# Patient Record
Sex: MALE | Race: WHITE | Employment: FULL TIME | URBAN - METROPOLITAN AREA
[De-identification: names, ages, dates, MRNs, and addresses within clinical notes are randomized per-mention and may not be internally consistent; named-entity substitution may affect disease eponyms.]

---

## 2017-03-08 ENCOUNTER — TRANSCRIBE ORDERS (OUTPATIENT)
Dept: SLEEP CENTER | Facility: CLINIC | Age: 52
End: 2017-03-08

## 2017-03-08 ENCOUNTER — HOSPITAL ENCOUNTER (OUTPATIENT)
Dept: SLEEP CENTER | Facility: CLINIC | Age: 52
Discharge: HOME | End: 2017-03-08
Payer: COMMERCIAL

## 2017-03-08 DIAGNOSIS — G47.33 OSA (OBSTRUCTIVE SLEEP APNEA): Primary | ICD-10-CM

## 2017-03-08 DIAGNOSIS — G47.33 OSA (OBSTRUCTIVE SLEEP APNEA): ICD-10-CM

## 2017-04-26 ENCOUNTER — HOSPITAL ENCOUNTER (OUTPATIENT)
Dept: SLEEP CENTER | Facility: CLINIC | Age: 52
Discharge: HOME | End: 2017-04-26
Payer: COMMERCIAL

## 2017-04-26 DIAGNOSIS — G47.33 OSA (OBSTRUCTIVE SLEEP APNEA): ICD-10-CM

## 2017-04-26 PROCEDURE — 95811 POLYSOM 6/>YRS CPAP 4/> PARM: CPT

## 2017-05-23 ENCOUNTER — HOSPITAL ENCOUNTER (OUTPATIENT)
Dept: SLEEP CENTER | Facility: CLINIC | Age: 52
Discharge: HOME | End: 2017-05-23
Payer: COMMERCIAL

## 2017-05-23 ENCOUNTER — TRANSCRIBE ORDERS (OUTPATIENT)
Dept: SLEEP CENTER | Facility: CLINIC | Age: 52
End: 2017-05-23

## 2017-05-23 DIAGNOSIS — G47.33 OSA (OBSTRUCTIVE SLEEP APNEA): Primary | ICD-10-CM

## 2017-05-23 DIAGNOSIS — G47.33 OSA (OBSTRUCTIVE SLEEP APNEA): ICD-10-CM

## 2018-05-01 ENCOUNTER — OFFICE VISIT (OUTPATIENT)
Dept: SLEEP CENTER | Facility: CLINIC | Age: 53
End: 2018-05-01
Payer: COMMERCIAL

## 2018-05-01 VITALS
BODY MASS INDEX: 27.25 KG/M2 | HEART RATE: 72 BPM | DIASTOLIC BLOOD PRESSURE: 64 MMHG | HEIGHT: 69 IN | WEIGHT: 184 LBS | SYSTOLIC BLOOD PRESSURE: 102 MMHG

## 2018-05-01 DIAGNOSIS — G47.33 OSA (OBSTRUCTIVE SLEEP APNEA): ICD-10-CM

## 2018-05-01 PROCEDURE — 99213 OFFICE O/P EST LOW 20 MIN: CPT | Performed by: INTERNAL MEDICINE

## 2018-05-01 NOTE — PROGRESS NOTES
Progress Note - Sleep Center   Khloe Moore :1965 MRN: [id number]      Reason for Visit:  46 y o male here for annual follow-up    Assessment:  Doing well on current therapy of CPAP 15 cm for very severe CECILIA (MARIZA = 60)  Plan:  Continue same    Follow up: One year    History of Present Illness:  History of CECILIA on PAP therapy  Fully compliant and deriving benefit  Once the patient's pressure was adjusted to 15 cm, his snoring was almost completely eliminated  Historical Information    Past Medical History: History reviewed  No pertinent past medical history  Past Surgical History: History reviewed  No pertinent surgical history  Social History - see chart  History   Alcohol use: Not on file     History   Drug Use Not on file     History   Smoking Status    Not on file   Smokeless Tobacco    Not on file     Family History: History reviewed  No pertinent family history  Medications/Allergies:    No current outpatient prescriptions on file  Review of Systems      Genitourinary none   Cardiology none   Gastrointestinal none   Neurology none   Constitutional none   Integumentary none   Psychiatry none   Musculoskeletal none   Pulmonary none   ENT none   Endocrine none   Hematological none             Objective      Vital Signs:   Vitals:    18 0800   BP: 102/64   Pulse: 72     [place] Sleepiness Scale: Total score: 5        Physical Exam:    General: Alert, appropriate, cooperative, overweight    Head: NC/AT    Skin: Warm, dry    Neuro: No motor abnormalities, cranial nerves appear intact    Extremity: No clubbing, cyanosis    PAP setting:   15 cm  DME Provider: [facility]    Counseling / Coordination of Care  Total clinic time spent today 15 minutes  Greater than 50% of total time was spent with the patient and / or family counseling and / or coordination of care   A description of the counseling / coordination of care: Discussed equipment and response to treatment  JACKSON Goyal    Board Certified Sleep Specialist

## 2019-05-07 ENCOUNTER — OFFICE VISIT (OUTPATIENT)
Dept: SLEEP CENTER | Facility: CLINIC | Age: 54
End: 2019-05-07
Payer: COMMERCIAL

## 2019-05-07 VITALS
WEIGHT: 194.38 LBS | HEIGHT: 69 IN | DIASTOLIC BLOOD PRESSURE: 76 MMHG | BODY MASS INDEX: 28.79 KG/M2 | SYSTOLIC BLOOD PRESSURE: 116 MMHG

## 2019-05-07 DIAGNOSIS — G47.33 OSA (OBSTRUCTIVE SLEEP APNEA): Primary | ICD-10-CM

## 2019-05-07 PROCEDURE — 99213 OFFICE O/P EST LOW 20 MIN: CPT | Performed by: INTERNAL MEDICINE

## 2019-08-12 ENCOUNTER — OFFICE VISIT (OUTPATIENT)
Dept: FAMILY MEDICINE CLINIC | Facility: CLINIC | Age: 54
End: 2019-08-12
Payer: COMMERCIAL

## 2019-08-12 VITALS
OXYGEN SATURATION: 96 % | HEART RATE: 90 BPM | BODY MASS INDEX: 27.31 KG/M2 | SYSTOLIC BLOOD PRESSURE: 110 MMHG | RESPIRATION RATE: 16 BRPM | WEIGHT: 184.4 LBS | HEIGHT: 69 IN | DIASTOLIC BLOOD PRESSURE: 78 MMHG | TEMPERATURE: 98.1 F

## 2019-08-12 DIAGNOSIS — Z00.00 WELL ADULT EXAM: ICD-10-CM

## 2019-08-12 DIAGNOSIS — Z13.9 ENCOUNTER FOR HEALTH-RELATED SCREENING: ICD-10-CM

## 2019-08-12 DIAGNOSIS — Z12.11 ENCOUNTER FOR SCREENING FOR MALIGNANT NEOPLASM OF COLON: ICD-10-CM

## 2019-08-12 DIAGNOSIS — Z11.59 NEED FOR HEPATITIS C SCREENING TEST: Primary | ICD-10-CM

## 2019-08-12 DIAGNOSIS — Z72.0 TOBACCO ABUSE: ICD-10-CM

## 2019-08-12 PROBLEM — G47.39 OTHER SLEEP APNEA: Status: ACTIVE | Noted: 2019-08-12

## 2019-08-12 PROCEDURE — 99214 OFFICE O/P EST MOD 30 MIN: CPT | Performed by: NURSE PRACTITIONER

## 2019-08-12 PROCEDURE — 3008F BODY MASS INDEX DOCD: CPT | Performed by: NURSE PRACTITIONER

## 2019-08-12 NOTE — PATIENT INSTRUCTIONS
Weight Management   AMBULATORY CARE:   Why it is important to manage your weight:  Being overweight increases your risk of health conditions such as heart disease, high blood pressure, type 2 diabetes, and certain types of cancer  It can also increase your risk for osteoarthritis, sleep apnea, and other respiratory problems  Aim for a slow, steady weight loss  Even a small amount of weight loss can lower your risk of health problems  How to lose weight safely:  A safe and healthy way to lose weight is to eat fewer calories and get regular exercise  You can lose up about 1 pound a week by decreasing the number of calories you eat by 500 calories each day  You can decrease calories by eating smaller portion sizes or by cutting out high-calorie foods  Read labels to find out how many calories are in the foods you eat  You can also burn calories with exercise such as walking, swimming, or biking  You will be more likely to keep weight off if you make these changes part of your lifestyle  Healthy meal plan for weight management:  A healthy meal plan includes a variety of foods, contains fewer calories, and helps you stay healthy  A healthy meal plan includes the following:  · Eat whole-grain foods more often  A healthy meal plan should contain fiber  Fiber is the part of grains, fruits, and vegetables that is not broken down by your body  Whole-grain foods are healthy and provide extra fiber in your diet  Some examples of whole-grain foods are whole-wheat breads and pastas, oatmeal, brown rice, and bulgur  · Eat a variety of vegetables every day  Include dark, leafy greens such as spinach, kale, kimberly greens, and mustard greens  Eat yellow and orange vegetables such as carrots, sweet potatoes, and winter squash  · Eat a variety of fruits every day  Choose fresh or canned fruit (canned in its own juice or light syrup) instead of juice  Fruit juice has very little or no fiber  · Eat low-fat dairy foods  Drink fat-free (skim) milk or 1% milk  Eat fat-free yogurt and low-fat cottage cheese  Try low-fat cheeses such as mozzarella and other reduced-fat cheeses  · Choose meat and other protein foods that are low in fat  Choose beans or other legumes such as split peas or lentils  Choose fish, skinless poultry (chicken or turkey), or lean cuts of red meat (beef or pork)  Before you cook meat or poultry, cut off any visible fat  · Use less fat and oil  Try baking foods instead of frying them  Add less fat, such as margarine, sour cream, regular salad dressing and mayonnaise to foods  Eat fewer high-fat foods  Some examples of high-fat foods include french fries, doughnuts, ice cream, and cakes  · Eat fewer sweets  Limit foods and drinks that are high in sugar  This includes candy, cookies, regular soda, and sweetened drinks  Ways to decrease calories:   · Eat smaller portions  ¨ Use a small plate with smaller servings  ¨ Do not eat second helpings  ¨ When you eat at a restaurant, ask for a box and place half of your meal in the box before you eat  ¨ Share an entrée with someone else  · Replace high-calorie snacks with healthy, low-calorie snacks  ¨ Choose fresh fruit, vegetables, fat-free rice cakes, or air-popped popcorn instead of potato chips, nuts, or chocolate  ¨ Choose water or calorie-free drinks instead of soda or sweetened drinks  · Eat regular meals  Skipping meals can lead to overeating later in the day  Eat a healthy snack in place of a meal if you do not have time to eat a regular meal      · Do not shop for groceries when you are hungry  You may be more likely to make unhealthy food choices  Take a grocery list of healthy foods and shop after you have eaten  Exercise:  Exercise at least 30 minutes per day on most days of the week  Some examples of exercise include walking, biking, dancing, and swimming   You can also fit in more physical activity by taking the stairs instead of the elevator or parking farther away from stores  Ask your healthcare provider about the best exercise plan for you  Other things to consider as you try to lose weight:   · Be aware of situations that may give you the urge to overeat, such as eating while watching television  Find ways to avoid these situations  For example, read a book, go for a walk, or do crafts  · Meet with a weight loss support group or friends who are also trying to lose weight  This may help you stay motivated to continue working on your weight loss goals  © 2017 [facility] Information is for End User's use only and may not be sold, redistributed or otherwise used for commercial purposes  All illustrations and images included in CareNotes® are the copyrighted property of [employer] D [employer] M [employer]  or [employer]  The above information is an  only  It is not intended as medical advice for individual conditions or treatments  Talk to your doctor, nurse or pharmacist before following any medical regimen to see if it is safe and effective for you  Heart Healthy Diet   AMBULATORY CARE:   A heart healthy diet  is an eating plan low in total fat, unhealthy fats, and sodium (salt)  A heart healthy diet helps decrease your risk for heart disease and stroke  Limit the amount of fat you eat to 25% to 35% of your total daily calories  Limit sodium to less than 2,300 mg each day  Healthy fats:  Healthy fats can help improve cholesterol levels  The risk for heart disease is decreased when cholesterol levels are normal  Choose healthy fats, such as the following:  · Unsaturated fat  is found in foods such as soybean, canola, olive, corn, and safflower oils  It is also found in soft tub margarine that is made with liquid vegetable oil  · Omega-3 fat  is found in certain fish, such as salmon, tuna, and trout, and in walnuts and flaxseed    Unhealthy fats:  Unhealthy fats can cause unhealthy cholesterol levels in your blood and increase your risk of heart disease  Limit unhealthy fats, such as the following:  · Cholesterol  is found in animal foods, such as eggs and lobster, and in dairy products made from whole milk  Limit cholesterol to less than 300 milligrams (mg) each day  You may need to limit cholesterol to 200 mg each day if you have heart disease  · Saturated fat  is found in meats, such as marsh and hamburger  It is also found in chicken or turkey skin, whole milk, and butter  Limit saturated fat to less than 7% of your total daily calories  Limit saturated fat to less than 6% if you have heart disease or are at increased risk for it  · Trans fat  is found in packaged foods, such as potato chips and cookies  It is also in hard margarine, some fried foods, and shortening  Avoid trans fats as much as possible    Heart healthy foods and drinks to include:  Ask your dietitian or healthcare provider how many servings to have from each of the following food groups:  · Grains:      ¨ Whole-wheat breads, cereals, and pastas, and brown rice    ¨ Low-fat, low-sodium crackers and chips    · Vegetables:      ¨ Broccoli, green beans, green peas, and spinach    ¨ Collards, kale, and lima beans    ¨ Carrots, sweet potatoes, tomatoes, and peppers    ¨ Canned vegetables with no salt added    · Fruits:      ¨ Bananas, peaches, pears, and pineapple    ¨ Grapes, raisins, and dates    ¨ Oranges, tangerines, grapefruit, orange juice, and grapefruit juice    ¨ Apricots, mangoes, melons, and papaya    ¨ Raspberries and strawberries    ¨ Canned fruit with no added sugar    · Low-fat dairy products:      ¨ Nonfat (skim) milk, 1% milk, and low-fat almond, cashew, or soy milks fortified with calcium    ¨ Low-fat cheese, regular or frozen yogurt, and cottage cheese    · Meats and proteins , such as lean cuts of beef and pork (loin, leg, round), skinless chicken and turkey, legumes, soy products, egg whites, and nuts  Foods and drinks to limit or avoid:  Ask your dietitian or healthcare provider about these and other foods that are high in unhealthy fat, sodium, and sugar:  · Snack or packaged foods , such as frozen dinners, cookies, macaroni and cheese, and cereals with more than 300 mg of sodium per serving    · Canned or dry mixes  for cakes, soups, sauces, or gravies    · Vegetables with added sodium , such as instant potatoes, vegetables with added sauces, or regular canned vegetables    · Other foods high in sodium , such as ketchup, barbecue sauce, salad dressing, pickles, olives, soy sauce, and miso    · High-fat dairy foods  such as whole or 2% milk, cream cheese, or sour cream, and cheeses     · High-fat protein foods  such as high-fat cuts of beef (T-bone steaks, ribs), chicken or turkey with skin, and organ meats, such as liver    · Cured or smoked meats , such as hot dogs, marsh, and sausage    · Unhealthy fats and oils , such as butter, stick margarine, shortening, and cooking oils such as coconut or palm oil    · Food and drinks high in sugar , such as soft drinks (soda), sports drinks, sweetened tea, candy, cake, cookies, pies, and doughnuts  Other diet guidelines to follow:   · Eat more foods containing omega-3 fats  Eat fish high in omega-3 fats at least 2 times a week  · Limit alcohol  Too much alcohol can damage your heart and raise your blood pressure  Women should limit alcohol to 1 drink a day  Men should limit alcohol to 2 drinks a day  A drink of alcohol is 12 ounces of beer, 5 ounces of wine, or 1½ ounces of liquor  · Choose low-sodium foods  High-sodium foods can lead to high blood pressure  Add little or no salt to food you prepare  Use herbs and spices in place of salt  · Eat more fiber  to help lower cholesterol levels  Eat at least 5 servings of fruits and vegetables each day  Eat 3 ounces of whole-grain foods each day  Legumes (beans) are also a good source of fiber    Lifestyle guidelines: · Do not smoke  Nicotine and other chemicals in cigarettes and cigars can cause lung and heart damage  Ask your healthcare provider for information if you currently smoke and need help to quit  E-cigarettes or smokeless tobacco still contain nicotine  Talk to your healthcare provider before you use these products  · Exercise regularly  to help you maintain a healthy weight and improve your blood pressure and cholesterol levels  Ask your healthcare provider about the best exercise plan for you  Do not start an exercise program without asking your healthcare provider  Follow up with your healthcare provider as directed:  Write down your questions so you remember to ask them during your visits  © 2017 [address] [employer] Information is for End User's use only and may not be sold, redistributed or otherwise used for commercial purposes  All illustrations and images included in CareNotes® are the copyrighted property of [employer]  or [employer]  The above information is an  only  It is not intended as medical advice for individual conditions or treatments  Talk to your doctor, nurse or pharmacist before following any medical regimen to see if it is safe and effective for you

## 2019-08-12 NOTE — ASSESSMENT & PLAN NOTE
Healthy adult male  Will order screening testing for fatigue  Discussed smoking cessation but he states he is not ready  He is to return for lab results

## 2019-08-12 NOTE — PROGRESS NOTES
Assessment/Plan:    Well adult exam  Healthy adult male  Will order screening testing for fatigue  Discussed smoking cessation but he states he is not ready  He is to return for lab results  Tobacco abuse  Discussed smoking cessation  He is not ready but will contact the when he is ready  Encounter for health-related screening  Will do screening labs including PSA, lipids and TSH  Diagnoses and all orders for this visit:    Need for hepatitis C screening test  -     Hepatitis C antibody; Future    Encounter for screening for malignant neoplasm of colon  -     Occult Blood, Fecal Immunochemical; Future    Encounter for health-related screening  -     PSA, total and free; Future  -     CBC and differential; Future  -     Comprehensive metabolic panel; Future  -     Lipid Panel with Direct LDL reflex; Future  -     TSH, 3rd generation with Free T4 reflex; Future    Tobacco abuse  -     CBC and differential; Future    Well adult exam          Subjective:      Patient ID: Gatito Lockwood is a 53 y o  male  HPI  Presents today for a well visit  He smokes 1 5 PPD  Has a history of sleep apnea  Wife is concerned about his being tried al the time  He works 12 hours 5-6 days a week  The following portions of the patient's history were reviewed and updated as appropriate:   He  has a past medical history of Sleep apnea  He   Patient Active Problem List    Diagnosis Date Noted    Tobacco abuse 08/12/2019    Other sleep apnea 08/12/2019    Well adult exam 08/12/2019    Encounter for health-related screening 08/12/2019    Need for hepatitis C screening test 08/12/2019    Encounter for screening for malignant neoplasm of colon 08/12/2019     He  has a past surgical history that includes Testicle surgery  His family history is not on file  He  reports that he has been smoking  He has been smoking about 1 50 packs per day  He uses smokeless tobacco  He reports that he drinks alcohol   He reports that he does not use drugs  No current outpatient medications on file  No current facility-administered medications for this visit  No current outpatient medications on file prior to visit  No current facility-administered medications on file prior to visit  He has No Known Allergies       Review of Systems   Constitutional: Positive for fatigue  HENT: Negative  Eyes: Negative  Respiratory: Positive for wheezing  Cardiovascular: Negative  Gastrointestinal: Negative  Genitourinary: Negative  Musculoskeletal: Negative  Skin: Negative  Allergic/Immunologic: Negative  Neurological: Negative  Hematological: Negative  Psychiatric/Behavioral: Negative  Objective:      /78   Pulse 90   Temp 98 1 °F (36 7 °C) (Tympanic)   Resp 16   Ht 5' 9" (1 753 m)   Wt 83 6 kg (184 lb 6 4 oz)   SpO2 96%   BMI 27 23 kg/m²          Physical Exam   Constitutional: He is oriented to person, place, and time  He appears well-developed and well-nourished  HENT:   Head: Normocephalic  Right Ear: External ear normal    Left Ear: External ear normal    Mouth/Throat: Oropharynx is clear and moist    Eyes: Pupils are equal, round, and reactive to light  Conjunctivae and EOM are normal    Neck: Normal range of motion  Neck supple  No thyromegaly present  Cardiovascular: Normal rate, regular rhythm, normal heart sounds and intact distal pulses  Pulmonary/Chest: Effort normal and breath sounds normal    Abdominal: Soft  Bowel sounds are normal    Musculoskeletal: Normal range of motion  Lymphadenopathy:     He has no cervical adenopathy  Neurological: He is alert and oriented to person, place, and time  Skin: Skin is warm and dry  Capillary refill takes less than 2 seconds  Psychiatric: He has a normal mood and affect  His behavior is normal  Judgment and thought content normal        BMI Counseling: Body mass index is 27 23 kg/m²   Discussed the patient's BMI with him  The BMI is above average  BMI counseling and education was provided to the patient  Nutrition recommendations include reducing portion sizes, decreasing overall calorie intake, 3-5 servings of fruits/vegetables daily, reducing fast food intake, consuming healthier snacks, decreasing soda and/or juice intake, moderation in carbohydrate intake, increasing intake of lean protein, reducing intake of saturated fat and trans fat and reducing intake of cholesterol  Exercise recommendations include moderate aerobic physical activity for 150 minutes/week, vigorous aerobic physical activity for 75 minutes/week and exercising 3-5 times per week  Pharmacotherapy was recommended as ordered

## 2019-08-21 ENCOUNTER — APPOINTMENT (OUTPATIENT)
Dept: LAB | Facility: IMAGING CENTER | Age: 54
End: 2019-08-21
Payer: COMMERCIAL

## 2019-08-21 DIAGNOSIS — Z13.9 ENCOUNTER FOR HEALTH-RELATED SCREENING: ICD-10-CM

## 2019-08-21 DIAGNOSIS — Z11.59 NEED FOR HEPATITIS C SCREENING TEST: ICD-10-CM

## 2019-08-21 DIAGNOSIS — Z72.0 TOBACCO ABUSE: ICD-10-CM

## 2019-08-21 LAB
BASOPHILS # BLD AUTO: 0.06 THOUSANDS/ΜL (ref 0–0.1)
BASOPHILS NFR BLD AUTO: 1 % (ref 0–1)
EOSINOPHIL # BLD AUTO: 0 THOUSAND/ΜL (ref 0–0.61)
EOSINOPHIL NFR BLD AUTO: 0 % (ref 0–6)
ERYTHROCYTE [DISTWIDTH] IN BLOOD BY AUTOMATED COUNT: 13.2 % (ref 11.6–15.1)
HCT VFR BLD AUTO: 53.1 % (ref 36.5–49.3)
HCV AB SER QL: NORMAL
HGB BLD-MCNC: 17.5 G/DL (ref 12–17)
IMM GRANULOCYTES # BLD AUTO: 0.05 THOUSAND/UL (ref 0–0.2)
IMM GRANULOCYTES NFR BLD AUTO: 1 % (ref 0–2)
LYMPHOCYTES # BLD AUTO: 2.34 THOUSANDS/ΜL (ref 0.6–4.47)
LYMPHOCYTES NFR BLD AUTO: 24 % (ref 14–44)
MCH RBC QN AUTO: 31.3 PG (ref 26.8–34.3)
MCHC RBC AUTO-ENTMCNC: 33 G/DL (ref 31.4–37.4)
MCV RBC AUTO: 95 FL (ref 82–98)
MONOCYTES # BLD AUTO: 0.66 THOUSAND/ΜL (ref 0.17–1.22)
MONOCYTES NFR BLD AUTO: 7 % (ref 4–12)
NEUTROPHILS # BLD AUTO: 6.68 THOUSANDS/ΜL (ref 1.85–7.62)
NEUTS SEG NFR BLD AUTO: 67 % (ref 43–75)
NRBC BLD AUTO-RTO: 0 /100 WBCS
PLATELET # BLD AUTO: 380 THOUSANDS/UL (ref 149–390)
PMV BLD AUTO: 9.7 FL (ref 8.9–12.7)
RBC # BLD AUTO: 5.59 MILLION/UL (ref 3.88–5.62)
TSH SERPL DL<=0.05 MIU/L-ACNC: 0.92 UIU/ML (ref 0.36–3.74)
WBC # BLD AUTO: 9.79 THOUSAND/UL (ref 4.31–10.16)

## 2019-08-21 PROCEDURE — 85025 COMPLETE CBC W/AUTO DIFF WBC: CPT

## 2019-08-21 PROCEDURE — 36415 COLL VENOUS BLD VENIPUNCTURE: CPT

## 2019-08-21 PROCEDURE — 86803 HEPATITIS C AB TEST: CPT

## 2019-08-21 PROCEDURE — 84153 ASSAY OF PSA TOTAL: CPT

## 2019-08-21 PROCEDURE — 84154 ASSAY OF PSA FREE: CPT

## 2019-08-21 PROCEDURE — 84443 ASSAY THYROID STIM HORMONE: CPT

## 2019-08-22 LAB
PSA FREE MFR SERPL: 7.1 %
PSA FREE SERPL-MCNC: 0.15 NG/ML
PSA SERPL-MCNC: 2.1 NG/ML (ref 0–4)

## 2019-08-26 ENCOUNTER — APPOINTMENT (OUTPATIENT)
Dept: LAB | Facility: IMAGING CENTER | Age: 54
End: 2019-08-26
Payer: COMMERCIAL

## 2019-08-26 LAB
ALBUMIN SERPL BCP-MCNC: 3.8 G/DL (ref 3.5–5)
ALP SERPL-CCNC: 68 U/L (ref 46–116)
ALT SERPL W P-5'-P-CCNC: 22 U/L (ref 12–78)
ANION GAP SERPL CALCULATED.3IONS-SCNC: 7 MMOL/L (ref 4–13)
AST SERPL W P-5'-P-CCNC: 8 U/L (ref 5–45)
BILIRUB SERPL-MCNC: 0.45 MG/DL (ref 0.2–1)
BUN SERPL-MCNC: 14 MG/DL (ref 5–25)
CALCIUM SERPL-MCNC: 8.9 MG/DL (ref 8.3–10.1)
CHLORIDE SERPL-SCNC: 107 MMOL/L (ref 100–108)
CHOLEST SERPL-MCNC: 208 MG/DL (ref 50–200)
CO2 SERPL-SCNC: 25 MMOL/L (ref 21–32)
CREAT SERPL-MCNC: 0.94 MG/DL (ref 0.6–1.3)
GFR SERPL CREATININE-BSD FRML MDRD: 91 ML/MIN/1.73SQ M
GLUCOSE P FAST SERPL-MCNC: 106 MG/DL (ref 65–99)
HDLC SERPL-MCNC: 39 MG/DL (ref 40–60)
LDLC SERPL CALC-MCNC: 145 MG/DL (ref 0–100)
POTASSIUM SERPL-SCNC: 4.3 MMOL/L (ref 3.5–5.3)
PROT SERPL-MCNC: 6.9 G/DL (ref 6.4–8.2)
SODIUM SERPL-SCNC: 139 MMOL/L (ref 136–145)
TRIGL SERPL-MCNC: 120 MG/DL

## 2019-08-26 PROCEDURE — 80053 COMPREHEN METABOLIC PANEL: CPT

## 2019-08-26 PROCEDURE — 80061 LIPID PANEL: CPT

## 2019-08-26 PROCEDURE — 36415 COLL VENOUS BLD VENIPUNCTURE: CPT

## 2019-08-28 ENCOUNTER — TELEPHONE (OUTPATIENT)
Dept: FAMILY MEDICINE CLINIC | Facility: CLINIC | Age: 54
End: 2019-08-28

## 2019-08-28 NOTE — TELEPHONE ENCOUNTER
----- Message from Taaz sent at 8/27/2019  7:19 PM EDT -----  LDL is elevated  He has to low fat, no marbled meats, increase fiber  Will recheck in 3 mmonths

## 2019-11-08 ENCOUNTER — TELEPHONE (OUTPATIENT)
Dept: SLEEP CENTER | Facility: CLINIC | Age: 54
End: 2019-11-08

## 2019-11-08 ENCOUNTER — OFFICE VISIT (OUTPATIENT)
Dept: SLEEP CENTER | Facility: CLINIC | Age: 54
End: 2019-11-08
Payer: COMMERCIAL

## 2019-11-08 VITALS
SYSTOLIC BLOOD PRESSURE: 110 MMHG | WEIGHT: 189 LBS | HEIGHT: 69 IN | BODY MASS INDEX: 27.99 KG/M2 | DIASTOLIC BLOOD PRESSURE: 68 MMHG

## 2019-11-08 DIAGNOSIS — G47.33 OSA (OBSTRUCTIVE SLEEP APNEA): Primary | ICD-10-CM

## 2019-11-08 PROCEDURE — 99213 OFFICE O/P EST LOW 20 MIN: CPT | Performed by: INTERNAL MEDICINE

## 2019-11-08 NOTE — PROGRESS NOTES
Progress Note - Sleep Center   Antonio Carracso :1965 MRN: [id number]      Reason for Visit:  53 y  o male here for PAP compliance check    Assessment:  Having difficulty with new PAP device  Sleep quality is unimproved  Compliance data show utilization for less than 70% of nights, for greater than or equal to 4 hours per night  Plan:  Improve compliance    Follow up: One year    History of Present Illness:  History of CECILIA on PAP therapy  Difficulty with compliance  Review of Systems      Genitourinary none   Cardiology none   Gastrointestinal none   Neurology none   Constitutional none   Integumentary none   Psychiatry none   Musculoskeletal legs twitching/jerking   Pulmonary snoring   ENT none   Endocrine none   Hematological none           I have reviewed and updated the review of systems as necessary    Historical Information    Past Medical History:   Past Medical History:   Diagnosis Date    Sleep apnea          Past Surgical History:   Past Surgical History:   Procedure Laterality Date    TESTICLE SURGERY           Social History:   Social History     Socioeconomic History    Marital status: Single     Spouse name: None    Number of children: None    Years of education: None    Highest education level: None   Occupational History    None   Social Needs    Financial resource strain: None    Food insecurity:     Worry: None     Inability: None    Transportation needs:     Medical: None     Non-medical: None   Tobacco Use    Smoking status: Current Every Day Smoker     Packs/day: 1 50    Smokeless tobacco: Current User   Substance and Sexual Activity    Alcohol use:  Yes    Drug use: No    Sexual activity: Yes     Partners: Female   Lifestyle    Physical activity:     Days per week: None     Minutes per session: None    Stress: None   Relationships    Social connections:     Talks on phone: None     Gets together: None     Attends Caodaism service: None     Active member of club or organization: None     Attends meetings of clubs or organizations: None     Relationship status: None    Intimate partner violence:     Fear of current or ex partner: None     Emotionally abused: None     Physically abused: None     Forced sexual activity: None   Other Topics Concern    None   Social History Narrative    None         Family History: No family history on file  Medications/Allergies:    No current outpatient medications on file  Objective    Vital Signs:   Vitals:    11/08/19 1000   BP: 110/68     [place] Sleepiness Scale: Total score: 5        Physical Exam:    General: Alert, appropriate, cooperative, overweight    Head: NC/AT    Skin: Warm, dry    Neuro: No motor abnormalities, cranial nerves appear intact    Extremity: No clubbing, cyanosis    PAP setting:   15 cm  DME Provider:  [facility]  Test results:  MARIAZ=60    Counseling / Coordination of Care  Total clinic time spent today 20 min  A description of the counseling / coordination of care: Discussed means to improve compliance  JACKSON Love    Board Certified Sleep Specialist

## 2019-11-08 NOTE — TELEPHONE ENCOUNTER
Received an RX for a pressure change  Changed accordingly  Patient's now set to an APAP 15-20cm   Patient was informed in the office

## 2020-01-09 PROBLEM — H26.9 CATARACT: Status: ACTIVE | Noted: 2020-01-09

## 2020-01-09 PROBLEM — Z01.818 PREOP EXAMINATION: Status: ACTIVE | Noted: 2020-01-09

## 2020-01-09 PROBLEM — Z13.9 ENCOUNTER FOR HEALTH-RELATED SCREENING: Status: RESOLVED | Noted: 2019-08-12 | Resolved: 2020-01-09

## 2020-01-09 PROBLEM — Z11.59 NEED FOR HEPATITIS C SCREENING TEST: Status: RESOLVED | Noted: 2019-08-12 | Resolved: 2020-01-09

## 2020-01-09 PROBLEM — E78.2 MIXED HYPERLIPIDEMIA: Status: ACTIVE | Noted: 2020-01-09

## 2020-01-10 ENCOUNTER — CONSULT (OUTPATIENT)
Dept: FAMILY MEDICINE CLINIC | Facility: CLINIC | Age: 55
End: 2020-01-10
Payer: COMMERCIAL

## 2020-01-10 VITALS
HEIGHT: 68 IN | BODY MASS INDEX: 28.64 KG/M2 | HEART RATE: 82 BPM | RESPIRATION RATE: 16 BRPM | DIASTOLIC BLOOD PRESSURE: 80 MMHG | WEIGHT: 189 LBS | OXYGEN SATURATION: 97 % | TEMPERATURE: 97.8 F | SYSTOLIC BLOOD PRESSURE: 120 MMHG

## 2020-01-10 DIAGNOSIS — G47.30 SLEEP APNEA WITH USE OF CONTINUOUS POSITIVE AIRWAY PRESSURE (CPAP): ICD-10-CM

## 2020-01-10 DIAGNOSIS — E78.2 MIXED HYPERLIPIDEMIA: ICD-10-CM

## 2020-01-10 DIAGNOSIS — Z12.11 COLON CANCER SCREENING: ICD-10-CM

## 2020-01-10 DIAGNOSIS — Z72.0 TOBACCO ABUSE: ICD-10-CM

## 2020-01-10 DIAGNOSIS — Z01.818 PREOP EXAMINATION: Primary | ICD-10-CM

## 2020-01-10 DIAGNOSIS — H25.9 SENILE CATARACT OF RIGHT EYE, UNSPECIFIED AGE-RELATED CATARACT TYPE: ICD-10-CM

## 2020-01-10 PROCEDURE — 99214 OFFICE O/P EST MOD 30 MIN: CPT | Performed by: PHYSICIAN ASSISTANT

## 2020-01-10 PROCEDURE — 99406 BEHAV CHNG SMOKING 3-10 MIN: CPT | Performed by: PHYSICIAN ASSISTANT

## 2020-01-10 NOTE — PROGRESS NOTES
Assessment/Plan:    Patient is an acceptable risk for the proposed procedure    BMI Counseling: Body mass index is 28 74 kg/m²  The BMI is above normal  Nutrition recommendations include reducing portion sizes, decreasing overall calorie intake and decreasing soda and/or juice intake  Exercise recommendations include exercising 3-5 times per week  he does drink coffee with sugar all day  I did recommend non caloric beverages    Tobacco Cessation Counseling: Tobacco cessation counseling and education was provided  The patient is sincerely urged to quit consumption of tobacco  He is not ready to quit tobacco  The numerous health risks of tobacco consumption were discussed  If he decides to quit, there are a number of helpful adjunctive aids, and he can see me to discuss nicotine replacement therapy, chantix, or bupropion anytime in the future  Smoking cessation has been discussed for about 5 minutes  He smokes 1/2 packs per day  He has been smoking since the age of 12  I did recommend a CT lung screen which is something he states he will think about     -cholesterol level was elevated in August   He will proceed with fasting blood work  -I did discuss the importance of a colonoscopy but he prefers to do the fit test instead  He does have the kit at home and he will drop it off at the time he goes for his blood work       Diagnoses and all orders for this visit:    Preop examination    Senile cataract of right eye, unspecified age-related cataract type    Sleep apnea with use of continuous positive airway pressure (CPAP)    Tobacco abuse    Mixed hyperlipidemia  -     Comprehensive metabolic panel  -     Lipid panel    Colon cancer screening  -     Occult Blood, Fecal Immunochemical; Future          Subjective:      Patient ID: Annelise Melchor is a 53 y o  male  Pre-Op Visit (Brief): The patient is being seen for a preoperative visit  The procedure is right eye cataract with Dr Rahel Adan on January 22nd   The indication for surgery is reduced vision right eye  Surgical Risk Assessment:   Prior Anesthesia: Yes   Prior adverse reaction to general anesthesia:No      Pertinent Past Medical History  Neck osteoarthrosis: No  Seizure disorder:No  Asthma:No  Angina:No  Arrhythmia:No  CAD:No  CAD without prior MI:No  CAD without recent PCI:No  CHF:No  Chronic liver disease:No  Acute hepatitis::No  Coagulation delay:No  Primary hypercoagulable state:No  Secondary hypercoagulable state:No  pulmonary embolism:No  DVT:No  Use anticoagulants:No  Diabetes:No  Insulin use:No  Thyroid disease:No  TMJ osteoarthrosis:No  Wear dentures:No  CVA:No  COPD:No  CECILIA:Yes   Renal disease:No  Low serum albumin:No  Obesity:No    Exercise Capacity  Are you able to walk two flights of stairs::Yes   Are you able to walk four blocks without symptoms:Yes     Lifestyle Factors  Alcohol use:Yes social  Tobacco use:Yes   Illegal drug use:No    Symptoms  Easy bleeding:No  Easy bruising:No  Frequent nosebleeds:No  Chest pain:No  Cough:No  Dyspnea:No  Edema:No  Palpitations:No  Wheezing:No    Pertinent Family History:  Any family members with the following problems:  Rx to anesthesia:No  Aneurysm:No  Bleeding problems:No  Sudden early deaths:No  Ischemic heart disease:Yes   Stroke:No      The following portions of the patient's history were reviewed and updated as appropriate:   He  has a past medical history of Sleep apnea  He   Patient Active Problem List    Diagnosis Date Noted    Preop examination 01/09/2020    Cataract 01/09/2020    Mixed hyperlipidemia 01/09/2020    Tobacco abuse 08/12/2019    Sleep apnea with use of continuous positive airway pressure (CPAP) 08/12/2019    Well adult exam 08/12/2019    Colon cancer screening 08/12/2019     He  has a past surgical history that includes Testicle surgery  His family history is not on file  He  reports that he has been smoking  He has been smoking about 1 50 packs per day   He uses smokeless tobacco  He reports that he drinks alcohol  He reports that he does not use drugs  No current outpatient medications on file  No current facility-administered medications for this visit  No current outpatient medications on file prior to visit  No current facility-administered medications on file prior to visit  He has No Known Allergies       Review of Systems      Objective:      /80 (BP Location: Left arm, Patient Position: Sitting, Cuff Size: Standard)   Pulse 82   Temp 97 8 °F (36 6 °C)   Resp 16   Ht 5' 8" (1 727 m)   Wt 85 7 kg (189 lb)   SpO2 97%   BMI 28 74 kg/m²          Physical Exam   Constitutional: He appears well-developed and well-nourished  No distress  HENT:   Head: Normocephalic and atraumatic  Right Ear: External ear normal    Left Ear: External ear normal    Mouth/Throat: Oropharynx is clear and moist  No oropharyngeal exudate  Neck: Neck supple  No thyromegaly present  Cardiovascular: Normal rate, regular rhythm and normal heart sounds  No murmur heard  Pulmonary/Chest: Effort normal and breath sounds normal  No respiratory distress  He has no wheezes  He has no rales  Abdominal: Soft  Bowel sounds are normal  He exhibits no mass  There is no tenderness  Musculoskeletal: He exhibits no edema or deformity  Lymphadenopathy:     He has no cervical adenopathy  Neurological: He is alert  Skin: Skin is warm  Psychiatric: He has a normal mood and affect

## 2020-03-03 ENCOUNTER — TRANSCRIBE ORDERS (OUTPATIENT)
Dept: ADMINISTRATIVE | Facility: HOSPITAL | Age: 55
End: 2020-03-03

## 2020-03-03 ENCOUNTER — APPOINTMENT (OUTPATIENT)
Dept: LAB | Facility: IMAGING CENTER | Age: 55
End: 2020-03-03
Payer: COMMERCIAL

## 2020-03-03 LAB
ALBUMIN SERPL BCP-MCNC: 3.9 G/DL (ref 3.5–5)
ALP SERPL-CCNC: 70 U/L (ref 46–116)
ALT SERPL W P-5'-P-CCNC: 22 U/L (ref 12–78)
ANION GAP SERPL CALCULATED.3IONS-SCNC: 6 MMOL/L (ref 4–13)
AST SERPL W P-5'-P-CCNC: 11 U/L (ref 5–45)
BILIRUB SERPL-MCNC: 0.43 MG/DL (ref 0.2–1)
BUN SERPL-MCNC: 12 MG/DL (ref 5–25)
CALCIUM SERPL-MCNC: 9.1 MG/DL (ref 8.3–10.1)
CHLORIDE SERPL-SCNC: 110 MMOL/L (ref 100–108)
CHOLEST SERPL-MCNC: 211 MG/DL (ref 50–200)
CO2 SERPL-SCNC: 26 MMOL/L (ref 21–32)
CREAT SERPL-MCNC: 1.04 MG/DL (ref 0.6–1.3)
GFR SERPL CREATININE-BSD FRML MDRD: 80 ML/MIN/1.73SQ M
GLUCOSE P FAST SERPL-MCNC: 89 MG/DL (ref 65–99)
HDLC SERPL-MCNC: 38 MG/DL
LDLC SERPL CALC-MCNC: 142 MG/DL (ref 0–100)
NONHDLC SERPL-MCNC: 173 MG/DL
POTASSIUM SERPL-SCNC: 4.5 MMOL/L (ref 3.5–5.3)
PROT SERPL-MCNC: 7.1 G/DL (ref 6.4–8.2)
SODIUM SERPL-SCNC: 142 MMOL/L (ref 136–145)
TRIGL SERPL-MCNC: 154 MG/DL

## 2020-03-03 PROCEDURE — 80061 LIPID PANEL: CPT | Performed by: PHYSICIAN ASSISTANT

## 2020-03-03 PROCEDURE — 36415 COLL VENOUS BLD VENIPUNCTURE: CPT | Performed by: PHYSICIAN ASSISTANT

## 2020-03-03 PROCEDURE — 80053 COMPREHEN METABOLIC PANEL: CPT | Performed by: PHYSICIAN ASSISTANT

## 2020-03-04 DIAGNOSIS — E78.2 MIXED HYPERLIPIDEMIA: Primary | ICD-10-CM

## 2020-03-11 ENCOUNTER — TELEPHONE (OUTPATIENT)
Dept: FAMILY MEDICINE CLINIC | Facility: CLINIC | Age: 55
End: 2020-03-11

## 2020-03-11 NOTE — TELEPHONE ENCOUNTER
----- Message from Amando Mccauley PA-C sent at 3/4/2020 12:05 PM EST -----  Please let him know that his blood work is normal for kidneys, liver, blood sugar  There has been a mild increase of his cholesterol from 208 up to 211  I still recommend a low-cholesterol diet  Triglycerides have also increased which is the fat in the blood  His level 6 months ago was 120 now 154  Low-fat diet and exercise will help lower this level  I would recommend a lipid panel in 6 months  I will put the order in the system  He should then follow-up thereafter

## 2020-06-08 ENCOUNTER — OFFICE VISIT (OUTPATIENT)
Dept: FAMILY MEDICINE CLINIC | Facility: CLINIC | Age: 55
End: 2020-06-08
Payer: COMMERCIAL

## 2020-06-08 VITALS
RESPIRATION RATE: 16 BRPM | DIASTOLIC BLOOD PRESSURE: 78 MMHG | OXYGEN SATURATION: 97 % | TEMPERATURE: 97.8 F | HEIGHT: 68 IN | WEIGHT: 181.6 LBS | HEART RATE: 82 BPM | BODY MASS INDEX: 27.52 KG/M2 | SYSTOLIC BLOOD PRESSURE: 120 MMHG

## 2020-06-08 DIAGNOSIS — E78.2 MIXED HYPERLIPIDEMIA: ICD-10-CM

## 2020-06-08 DIAGNOSIS — Z72.0 TOBACCO ABUSE: Primary | ICD-10-CM

## 2020-06-08 DIAGNOSIS — N52.9 ERECTILE DYSFUNCTION, UNSPECIFIED ERECTILE DYSFUNCTION TYPE: ICD-10-CM

## 2020-06-08 PROCEDURE — 99214 OFFICE O/P EST MOD 30 MIN: CPT | Performed by: PHYSICIAN ASSISTANT

## 2020-06-08 PROCEDURE — 3008F BODY MASS INDEX DOCD: CPT | Performed by: PHYSICIAN ASSISTANT

## 2020-06-08 PROCEDURE — 99406 BEHAV CHNG SMOKING 3-10 MIN: CPT | Performed by: PHYSICIAN ASSISTANT

## 2020-06-08 PROCEDURE — 4004F PT TOBACCO SCREEN RCVD TLK: CPT | Performed by: PHYSICIAN ASSISTANT

## 2020-06-08 RX ORDER — SILDENAFIL 100 MG/1
100 TABLET, FILM COATED ORAL DAILY PRN
Qty: 10 TABLET | Refills: 2 | Status: SHIPPED | OUTPATIENT
Start: 2020-06-08

## 2020-06-08 RX ORDER — NICOTINE 21 MG/24HR
1 PATCH, TRANSDERMAL 24 HOURS TRANSDERMAL EVERY 24 HOURS
Qty: 14 PATCH | Refills: 2 | Status: SHIPPED | OUTPATIENT
Start: 2020-06-08 | End: 2020-09-08

## 2020-06-29 ENCOUNTER — OFFICE VISIT (OUTPATIENT)
Dept: FAMILY MEDICINE CLINIC | Facility: CLINIC | Age: 55
End: 2020-06-29
Payer: COMMERCIAL

## 2020-06-29 VITALS
BODY MASS INDEX: 26.98 KG/M2 | SYSTOLIC BLOOD PRESSURE: 126 MMHG | DIASTOLIC BLOOD PRESSURE: 80 MMHG | WEIGHT: 178 LBS | HEIGHT: 68 IN | HEART RATE: 71 BPM | OXYGEN SATURATION: 96 % | TEMPERATURE: 97.8 F | RESPIRATION RATE: 16 BRPM

## 2020-06-29 DIAGNOSIS — F41.9 ANXIETY: Primary | ICD-10-CM

## 2020-06-29 DIAGNOSIS — F32.A DEPRESSION, UNSPECIFIED DEPRESSION TYPE: ICD-10-CM

## 2020-06-29 DIAGNOSIS — Z72.0 TOBACCO ABUSE: ICD-10-CM

## 2020-06-29 PROCEDURE — 99213 OFFICE O/P EST LOW 20 MIN: CPT | Performed by: PHYSICIAN ASSISTANT

## 2020-06-29 PROCEDURE — 3008F BODY MASS INDEX DOCD: CPT | Performed by: PHYSICIAN ASSISTANT

## 2020-06-29 PROCEDURE — 4004F PT TOBACCO SCREEN RCVD TLK: CPT | Performed by: PHYSICIAN ASSISTANT

## 2020-07-29 ENCOUNTER — TELEPHONE (OUTPATIENT)
Dept: OTHER | Facility: OTHER | Age: 55
End: 2020-07-29

## 2020-07-30 ENCOUNTER — TELEPHONE (OUTPATIENT)
Dept: FAMILY MEDICINE CLINIC | Facility: CLINIC | Age: 55
End: 2020-07-30

## 2020-07-30 NOTE — TELEPHONE ENCOUNTER
He was seen on 6/29/20 and in your note it mentioned he can increase dose after 2 weeks  I see it was filled for #90 with 1 refill so I am not sure why it was only for #21   I did lm for him to call office

## 2020-07-30 NOTE — TELEPHONE ENCOUNTER
----- Message from Caleb Hooker sent at 7/30/2020  3:00 PM EDT -----  Regarding: Prescription Question  Contact: [phone]  Hi  I have a question about my prescription of Zoloft  On the bottle it says 21 pills I thought it was for 30 days  Also I wanted to see if I can get a higher dose of the Zoloft before refilling  I'm  On 50 mg now    Thank you

## 2020-08-04 NOTE — TELEPHONE ENCOUNTER
I called [facility] the pt only received #21 supply of zoloft because its all his plan allowed at pharmacy  The pharmacy technician believes its because he needs to use mail order to get a 90 day supply  I did inform pt and he will check with his insurance  He would like the zoloft 100 mg as he feels it can be tolerated  He is scheduled for a f/u on 9/8/20   He will go get another #21 supply at [facility] and then let us know what mail away pharmacy he needs to use so we can send the 100 mg

## 2020-08-10 ENCOUNTER — TELEPHONE (OUTPATIENT)
Dept: FAMILY MEDICINE CLINIC | Facility: CLINIC | Age: 55
End: 2020-08-10

## 2020-08-10 DIAGNOSIS — F32.A DEPRESSION, UNSPECIFIED DEPRESSION TYPE: Primary | ICD-10-CM

## 2020-08-10 NOTE — TELEPHONE ENCOUNTER
Pt sent a message in his my chart looking to get a counsler due to his depression  I advised him to call his insurance to see who participates   I did put in a ref for [facility] along with phone number to sonia

## 2020-08-31 ENCOUNTER — TELEPHONE (OUTPATIENT)
Dept: PSYCHIATRY | Facility: CLINIC | Age: 55
End: 2020-08-31

## 2020-09-01 ENCOUNTER — TELEPHONE (OUTPATIENT)
Dept: PSYCHIATRY | Facility: CLINIC | Age: 55
End: 2020-09-01

## 2020-09-02 ENCOUNTER — TELEPHONE (OUTPATIENT)
Dept: FAMILY MEDICINE CLINIC | Facility: CLINIC | Age: 55
End: 2020-09-02

## 2020-09-02 ENCOUNTER — TELEPHONE (OUTPATIENT)
Dept: PSYCHIATRY | Facility: CLINIC | Age: 55
End: 2020-09-02

## 2020-09-02 NOTE — TELEPHONE ENCOUNTER
At the time of pre charting I did notice that the patient sent a message through my chart asking if he needed to fast for blood work on August 27th  Please let him know that he does need to fast and the orders are in the system    Thank you

## 2020-09-04 ENCOUNTER — TELEPHONE (OUTPATIENT)
Dept: PSYCHIATRY | Facility: CLINIC | Age: 55
End: 2020-09-04

## 2020-09-04 ENCOUNTER — APPOINTMENT (OUTPATIENT)
Dept: LAB | Facility: IMAGING CENTER | Age: 55
End: 2020-09-04
Payer: COMMERCIAL

## 2020-09-04 LAB
ALBUMIN SERPL BCP-MCNC: 3.7 G/DL (ref 3.5–5)
ALP SERPL-CCNC: 68 U/L (ref 46–116)
ALT SERPL W P-5'-P-CCNC: 22 U/L (ref 12–78)
ANION GAP SERPL CALCULATED.3IONS-SCNC: 5 MMOL/L (ref 4–13)
AST SERPL W P-5'-P-CCNC: 11 U/L (ref 5–45)
BILIRUB SERPL-MCNC: 0.32 MG/DL (ref 0.2–1)
BUN SERPL-MCNC: 15 MG/DL (ref 5–25)
CALCIUM SERPL-MCNC: 9.2 MG/DL (ref 8.3–10.1)
CHLORIDE SERPL-SCNC: 111 MMOL/L (ref 100–108)
CHOLEST SERPL-MCNC: 198 MG/DL (ref 50–200)
CO2 SERPL-SCNC: 26 MMOL/L (ref 21–32)
CREAT SERPL-MCNC: 0.95 MG/DL (ref 0.6–1.3)
GFR SERPL CREATININE-BSD FRML MDRD: 89 ML/MIN/1.73SQ M
GLUCOSE P FAST SERPL-MCNC: 102 MG/DL (ref 65–99)
HDLC SERPL-MCNC: 43 MG/DL
LDLC SERPL CALC-MCNC: 135 MG/DL (ref 0–100)
NONHDLC SERPL-MCNC: 155 MG/DL
POTASSIUM SERPL-SCNC: 4 MMOL/L (ref 3.5–5.3)
PROT SERPL-MCNC: 7 G/DL (ref 6.4–8.2)
SODIUM SERPL-SCNC: 142 MMOL/L (ref 136–145)
TRIGL SERPL-MCNC: 101 MG/DL

## 2020-09-04 PROCEDURE — 80061 LIPID PANEL: CPT | Performed by: PHYSICIAN ASSISTANT

## 2020-09-04 PROCEDURE — 36415 COLL VENOUS BLD VENIPUNCTURE: CPT | Performed by: PHYSICIAN ASSISTANT

## 2020-09-04 PROCEDURE — 80053 COMPREHEN METABOLIC PANEL: CPT | Performed by: PHYSICIAN ASSISTANT

## 2020-09-08 ENCOUNTER — OFFICE VISIT (OUTPATIENT)
Dept: FAMILY MEDICINE CLINIC | Facility: CLINIC | Age: 55
End: 2020-09-08
Payer: COMMERCIAL

## 2020-09-08 VITALS
WEIGHT: 172.8 LBS | SYSTOLIC BLOOD PRESSURE: 110 MMHG | HEIGHT: 68 IN | DIASTOLIC BLOOD PRESSURE: 80 MMHG | TEMPERATURE: 97.8 F | HEART RATE: 73 BPM | RESPIRATION RATE: 16 BRPM | BODY MASS INDEX: 26.19 KG/M2

## 2020-09-08 DIAGNOSIS — Z72.0 TOBACCO ABUSE: ICD-10-CM

## 2020-09-08 DIAGNOSIS — E78.2 MIXED HYPERLIPIDEMIA: Primary | ICD-10-CM

## 2020-09-08 DIAGNOSIS — F32.A DEPRESSION, UNSPECIFIED DEPRESSION TYPE: ICD-10-CM

## 2020-09-08 DIAGNOSIS — F41.9 ANXIETY: ICD-10-CM

## 2020-09-08 PROCEDURE — 99406 BEHAV CHNG SMOKING 3-10 MIN: CPT | Performed by: PHYSICIAN ASSISTANT

## 2020-09-08 PROCEDURE — 99214 OFFICE O/P EST MOD 30 MIN: CPT | Performed by: PHYSICIAN ASSISTANT

## 2020-09-08 RX ORDER — SERTRALINE HYDROCHLORIDE 100 MG/1
100 TABLET, FILM COATED ORAL DAILY
Qty: 90 TABLET | Refills: 1 | Status: SHIPPED | OUTPATIENT
Start: 2020-09-08 | End: 2020-11-03

## 2020-09-08 RX ORDER — NICOTINE 21 MG/24HR
1 PATCH, TRANSDERMAL 24 HOURS TRANSDERMAL EVERY 24 HOURS
Qty: 14 PATCH | Refills: 2 | Status: SHIPPED | OUTPATIENT
Start: 2020-09-08 | End: 2020-10-12

## 2020-09-08 NOTE — PROGRESS NOTES
Assessment/Plan:    -I did recommend increase the sertraline from 50 mg daily up to 100 mg daily  -he is still trying to talk to a counselor but he states they been playing phone tag  -lab results have been reviewed  Cholesterol has decreased down to 198 from 211  Continue a low-cholesterol diet  -continue with smoking cessation  I did send the 14 mg patch  He did reduce his smoking from 1 and half packs daily down to 4-5 cigarettes daily despite being under lot of stress  -I did recommend the flu vaccine in October November  -recommend follow-up in December for depression/anxiety  Repeat lipids in March    Tobacco Cessation Counseling: Tobacco cessation counseling and education was provided  The patient is sincerely urged to quit consumption of tobacco  He is ready to quit tobacco  The numerous health risks of tobacco consumption were discussed  Prescribed the following medications: nicotine patch  This was discussed for about 5 minutes    I have spent 25 minutes with Patient  today in which greater than 50% of this time was spent in counseling/coordination of care regarding Diagnostic results, Prognosis, Risks and benefits of tx options, Intructions for management, Patient and family education, Importance of tx compliance, Risk factor reductions and Impressions  M*[employer] software was used to dictate this note  It may contain errors with dictating incorrect words/spelling  Please contact provider directly for any questions  Diagnoses and all orders for this visit:    Mixed hyperlipidemia    Anxiety  -     sertraline (ZOLOFT) 100 mg tablet; Take 1 tablet (100 mg total) by mouth daily    Depression, unspecified depression type  -     sertraline (ZOLOFT) 100 mg tablet; Take 1 tablet (100 mg total) by mouth daily    Tobacco abuse  -     nicotine (NICODERM CQ) 14 mg/24hr TD 24 hr patch; Place 1 patch on the skin every 24 hours          Subjective:      Patient ID: Carol Holland is a 63 y o  male     Patient presents today for follow-up of depression/anxiety/hyperlipidemia  He states overall he has noticed improvement with his depression/anxiety  He continues on the sertraline 50 mg daily  He has not tried the 100 mg  He has been watching his diet more closely and avoiding a eggs mostly with regards to his cholesterol  He states he still continues to be under lot of stress  Unfortunately he has not quit smoking entirely but he has reduced his smoking daily down to 4-5 cigarettes daily from 1 and half packs of cigarettes to 2 packs of cigarettes daily  He states he stop the nicotine patch 21 mg about 2 weeks ago  The following portions of the patient's history were reviewed and updated as appropriate:   He  has a past medical history of Sleep apnea  He   Patient Active Problem List    Diagnosis Date Noted    Anxiety 06/29/2020    Depression 06/29/2020    Erectile dysfunction 06/08/2020    Preop examination 01/09/2020    Cataract 01/09/2020    Mixed hyperlipidemia 01/09/2020    Tobacco abuse 08/12/2019    Sleep apnea with use of continuous positive airway pressure (CPAP) 08/12/2019    Well adult exam 08/12/2019    Colon cancer screening 08/12/2019     He  has a past surgical history that includes Testicle surgery  His family history is not on file  He  reports that he has been smoking  He has been smoking about 1 50 packs per day  He uses smokeless tobacco  He reports current alcohol use  He reports that he does not use drugs  Current Outpatient Medications   Medication Sig Dispense Refill    sertraline (ZOLOFT) 100 mg tablet Take 1 tablet (100 mg total) by mouth daily 90 tablet 1    sildenafil (VIAGRA) 100 mg tablet Take 1 tablet (100 mg total) by mouth daily as needed for erectile dysfunction 10 tablet 2    nicotine (NICODERM CQ) 14 mg/24hr TD 24 hr patch Place 1 patch on the skin every 24 hours 14 patch 2     No current facility-administered medications for this visit  Current Outpatient Medications on File Prior to Visit   Medication Sig    sildenafil (VIAGRA) 100 mg tablet Take 1 tablet (100 mg total) by mouth daily as needed for erectile dysfunction    [DISCONTINUED] sertraline (ZOLOFT) 50 mg tablet Take 1 tablet (50 mg total) by mouth daily    [DISCONTINUED] nicotine (NICODERM CQ) 21 mg/24 hr TD 24 hr patch Place 1 patch on the skin every 24 hours (Patient not taking: Reported on 6/29/2020)     No current facility-administered medications on file prior to visit  He has No Known Allergies       Review of Systems   Constitutional:        He has lost 9 lb since June but he states that he feels as though it is related to his stress  Respiratory: Negative for cough and shortness of breath  Cardiovascular: Negative for chest pain and leg swelling  Gastrointestinal: Negative for abdominal pain  Psychiatric/Behavioral:        As stated in HPI         Objective:      /80 (BP Location: Left arm, Patient Position: Sitting, Cuff Size: Standard)   Pulse 73   Temp 97 8 °F (36 6 °C) (Tympanic)   Resp 16   Ht 5' 8" (1 727 m)   Wt 78 4 kg (172 lb 12 8 oz)   BMI 26 27 kg/m²          Physical Exam  Constitutional:       General: He is not in acute distress  Appearance: Normal appearance  He is well-developed  He is not ill-appearing, toxic-appearing or diaphoretic  HENT:      Head: Normocephalic and atraumatic  Right Ear: Tympanic membrane, ear canal and external ear normal       Left Ear: Tympanic membrane, ear canal and external ear normal    Neck:      Musculoskeletal: Normal range of motion and neck supple  Thyroid: No thyromegaly  Cardiovascular:      Rate and Rhythm: Normal rate and regular rhythm  Heart sounds: Normal heart sounds  No murmur  Pulmonary:      Effort: Pulmonary effort is normal  No respiratory distress  Breath sounds: Normal breath sounds  No wheezing or rales     Abdominal:      General: Bowel sounds are normal  Palpations: Abdomen is soft  There is no mass  Tenderness: There is no abdominal tenderness  Lymphadenopathy:      Cervical: No cervical adenopathy  Skin:     General: Skin is warm  Neurological:      General: No focal deficit present  Mental Status: He is alert     Psychiatric:         Mood and Affect: Mood normal

## 2020-09-09 ENCOUNTER — OFFICE VISIT (OUTPATIENT)
Dept: SLEEP CENTER | Facility: CLINIC | Age: 55
End: 2020-09-09
Payer: COMMERCIAL

## 2020-09-09 VITALS
SYSTOLIC BLOOD PRESSURE: 110 MMHG | DIASTOLIC BLOOD PRESSURE: 60 MMHG | HEART RATE: 81 BPM | HEIGHT: 68 IN | WEIGHT: 175 LBS | BODY MASS INDEX: 26.52 KG/M2

## 2020-09-09 DIAGNOSIS — G47.33 OSA (OBSTRUCTIVE SLEEP APNEA): Primary | ICD-10-CM

## 2020-09-09 PROCEDURE — 99214 OFFICE O/P EST MOD 30 MIN: CPT | Performed by: INTERNAL MEDICINE

## 2020-09-09 NOTE — PROGRESS NOTES
Progress Note - Sleep Center   Cat Velasquez :1965 MRN: [id number]      Reason for Visit:  63 y o male here for annual follow-up    Assessment:  Doing well on current therapy of CPAP 15 cm, except for complaint of snoring according to his wife  The patient has severe CECILIA with AHI = 60 0  Plan:  I will change the machine to AutoPAP with a range of 14 to 20 cm, which I hopeful eliminate snoring  He may require a new machine  If his snoring persists, I will order another titration study  I discussed the importance of compliance and ways to improve it  Follow up: One year    History of Present Illness:  History of CECILIA on PAP therapy  Partially compliant, due to forgetting to put his device on when he goes to bed  Review of Systems      Genitourinary none   Cardiology none   Gastrointestinal none   Neurology none   Constitutional none   Integumentary none   Psychiatry none   Musculoskeletal none   Pulmonary snoring   ENT none   Endocrine none   Hematological none         I have reviewed and updated the review of systems as necessary      Historical Information    Past Medical History:   Past Medical History:   Diagnosis Date    Sleep apnea          Past Surgical History:   Past Surgical History:   Procedure Laterality Date    TESTICLE SURGERY         Social History:   Social History     Socioeconomic History    Marital status: Single     Spouse name: None    Number of children: None    Years of education: None    Highest education level: None   Occupational History    None   Social Needs    Financial resource strain: None    Food insecurity     Worry: None     Inability: None    Transportation needs     Medical: None     Non-medical: None   Tobacco Use    Smoking status: Current Every Day Smoker     Packs/day: 1 50    Smokeless tobacco: Current User   Substance and Sexual Activity    Alcohol use:  Yes    Drug use: No    Sexual activity: Yes     Partners: Female   Lifestyle    Physical activity     Days per week: None     Minutes per session: None    Stress: None   Relationships    Social connections     Talks on phone: None     Gets together: None     Attends Hindu service: None     Active member of club or organization: None     Attends meetings of clubs or organizations: None     Relationship status: None    Intimate partner violence     Fear of current or ex partner: None     Emotionally abused: None     Physically abused: None     Forced sexual activity: None   Other Topics Concern    None   Social History Narrative    None       Family History: No family history on file  Medications/Allergies:      Current Outpatient Medications:     nicotine (NICODERM CQ) 14 mg/24hr TD 24 hr patch, Place 1 patch on the skin every 24 hours, Disp: 14 patch, Rfl: 2    sertraline (ZOLOFT) 100 mg tablet, Take 1 tablet (100 mg total) by mouth daily, Disp: 90 tablet, Rfl: 1    sildenafil (VIAGRA) 100 mg tablet, Take 1 tablet (100 mg total) by mouth daily as needed for erectile dysfunction, Disp: 10 tablet, Rfl: 2          Objective      Vital Signs:   Vitals:    09/09/20 1100   BP: 110/60   Pulse: 81     [place] Sleepiness Scale: Total score: 14        Physical Exam:    General: Alert, appropriate, cooperative, overweight    Head: NC/AT    Skin: Warm, dry    Neuro: No motor abnormalities, cranial nerves appear intact    Extremity: No clubbing, cyanosis      DME Provider: [facility]        Counseling / Coordination of Care   I have spent 15 minutes with the patient today in which greater than 50% of this time was spent in counseling/coordination of care regarding: equipment and compliance  Board Certified Sleep Specialist    Portions of the record may have been created with voice recognition software  Occasional wrong word or "sound a like" substitutions may have occurred due to the inherent limitations of voice recognition software    Read the chart carefully and recognize, using context, where substitutions have occurred

## 2020-09-25 ENCOUNTER — TELEPHONE (OUTPATIENT)
Dept: PSYCHIATRY | Facility: CLINIC | Age: 55
End: 2020-09-25

## 2020-09-25 NOTE — TELEPHONE ENCOUNTER
Behavorial Health Outpatient Intake Questions    Referred by:    Please advised interviewee that they need to answer all questions truthfully to allow for best care and any misrepresentations of information may affect their ability to be seen at this clinic   => Was this discussed? Yes     [facility]     Presenting Problem (in patient's words): anxiety, depression, lost son and mother a year ago  Are there any developmental disabilities? ? If yes, can they speak to you on the phone? If they are too limited to speak to you on phone, refer out NO    Are you taking any psychiatric medications? Yes    => If yes, who prescribes? If yes, are they injectable medications? Zoloft 100 mg PCP    Does the patient have a language barrier or hearing impairment? No    Have you been treated at [facility] by a therapist or a doctor in the past? If yes, who? No    Has the patient been hospitalized for mental health? No   If yes, how long ago was last hospitalization and where was it? Do you actively use alcohol or marijuana or illegal substances? If yes, what and how much - refer out to Drug and alcohol treatment if use is excessive or daily use of illegal substances No concerns of substance abuse are reported  Do you have a community treatment team or ? No    Legal History-     Does the patient have any history of arrests, skilled nursing/skilled nursing time, or DUIs? No  If Yes-  1) What types of charges? 2) When were they last incarcerated? 3) Are they currently on parole or probation? Minor Child-    Who has custody of the child? Is there a custody agreement? If there is a custody agreement remind parent that they must bring a copy to the first appt or they will not be seen       Intake Team, please check with provider before scheduling if flags come up such as:  - complex case  - legal history (other than DUI)  - communication barrier concerns are present  - if, in your judgment, this needs further review    ACCEPTED as a patient Yes  => Appointment Date: 11/02/2020 w/ DR RODRIGUEZ    Referred Elsewhere? No    Name of Insurance Co: [address] [place] [address] [place] ID# [id number]  [id number] Phone #  If ins is primary or secondary  If patient is a minor, parents information such as Name, D  O B of guarantor

## 2020-09-30 ENCOUNTER — TELEPHONE (OUTPATIENT)
Dept: FAMILY MEDICINE CLINIC | Facility: CLINIC | Age: 55
End: 2020-09-30

## 2020-10-01 ENCOUNTER — TELEPHONE (OUTPATIENT)
Dept: SLEEP CENTER | Facility: CLINIC | Age: 55
End: 2020-10-01

## 2020-10-02 ENCOUNTER — TELEPHONE (OUTPATIENT)
Dept: SLEEP CENTER | Facility: CLINIC | Age: 55
End: 2020-10-02

## 2020-10-12 ENCOUNTER — OFFICE VISIT (OUTPATIENT)
Dept: FAMILY MEDICINE CLINIC | Facility: CLINIC | Age: 55
End: 2020-10-12
Payer: COMMERCIAL

## 2020-10-12 VITALS
WEIGHT: 177.6 LBS | DIASTOLIC BLOOD PRESSURE: 70 MMHG | BODY MASS INDEX: 26.92 KG/M2 | SYSTOLIC BLOOD PRESSURE: 118 MMHG | HEART RATE: 94 BPM | OXYGEN SATURATION: 98 % | RESPIRATION RATE: 16 BRPM | HEIGHT: 68 IN | TEMPERATURE: 97.8 F

## 2020-10-12 DIAGNOSIS — R09.81 COMPLAINT OF NASAL CONGESTION: ICD-10-CM

## 2020-10-12 DIAGNOSIS — Z72.0 TOBACCO ABUSE: Primary | ICD-10-CM

## 2020-10-12 PROBLEM — F32.A DEPRESSION: Status: RESOLVED | Noted: 2020-06-29 | Resolved: 2020-10-12

## 2020-10-12 PROBLEM — F33.0 MILD EPISODE OF RECURRENT MAJOR DEPRESSIVE DISORDER (HCC): Status: ACTIVE | Noted: 2020-10-12

## 2020-10-12 PROCEDURE — 99213 OFFICE O/P EST LOW 20 MIN: CPT | Performed by: PHYSICIAN ASSISTANT

## 2020-10-12 PROCEDURE — 99406 BEHAV CHNG SMOKING 3-10 MIN: CPT | Performed by: PHYSICIAN ASSISTANT

## 2020-10-12 PROCEDURE — 4004F PT TOBACCO SCREEN RCVD TLK: CPT | Performed by: PHYSICIAN ASSISTANT

## 2020-10-12 RX ORDER — VARENICLINE TARTRATE 25 MG
KIT ORAL
Qty: 53 TABLET | Refills: 0 | Status: SHIPPED | OUTPATIENT
Start: 2020-10-12 | End: 2020-10-13 | Stop reason: SDUPTHER

## 2020-10-13 DIAGNOSIS — Z72.0 TOBACCO ABUSE: ICD-10-CM

## 2020-10-13 RX ORDER — VARENICLINE TARTRATE 25 MG
KIT ORAL
Qty: 53 TABLET | Refills: 0 | Status: SHIPPED | OUTPATIENT
Start: 2020-10-13 | End: 2020-11-11

## 2020-10-19 ENCOUNTER — CLINICAL SUPPORT (OUTPATIENT)
Dept: FAMILY MEDICINE CLINIC | Facility: CLINIC | Age: 55
End: 2020-10-19
Payer: COMMERCIAL

## 2020-10-19 DIAGNOSIS — Z23 IMMUNIZATION DUE: Primary | ICD-10-CM

## 2020-10-19 PROCEDURE — 90682 RIV4 VACC RECOMBINANT DNA IM: CPT

## 2020-10-19 PROCEDURE — 90471 IMMUNIZATION ADMIN: CPT

## 2020-11-03 ENCOUNTER — OFFICE VISIT (OUTPATIENT)
Dept: PSYCHIATRY | Facility: CLINIC | Age: 55
End: 2020-11-03
Payer: COMMERCIAL

## 2020-11-03 DIAGNOSIS — F32.A DEPRESSION, UNSPECIFIED DEPRESSION TYPE: ICD-10-CM

## 2020-11-03 DIAGNOSIS — F33.0 MILD EPISODE OF RECURRENT MAJOR DEPRESSIVE DISORDER (HCC): ICD-10-CM

## 2020-11-03 DIAGNOSIS — F41.9 ANXIETY: ICD-10-CM

## 2020-11-03 DIAGNOSIS — N52.2 DRUG-INDUCED ERECTILE DYSFUNCTION: Primary | ICD-10-CM

## 2020-11-03 DIAGNOSIS — Z72.0 TOBACCO ABUSE: ICD-10-CM

## 2020-11-03 PROCEDURE — 90792 PSYCH DIAG EVAL W/MED SRVCS: CPT | Performed by: STUDENT IN AN ORGANIZED HEALTH CARE EDUCATION/TRAINING PROGRAM

## 2020-11-03 RX ORDER — BUPROPION HYDROCHLORIDE 150 MG/1
150 TABLET ORAL DAILY
Qty: 30 TABLET | Refills: 1 | Status: SHIPPED | OUTPATIENT
Start: 2020-11-03 | End: 2020-11-16

## 2020-11-11 DIAGNOSIS — Z72.0 TOBACCO ABUSE: ICD-10-CM

## 2020-11-11 DIAGNOSIS — Z72.0 TOBACCO ABUSE: Primary | ICD-10-CM

## 2020-11-11 RX ORDER — VARENICLINE TARTRATE 25 MG
KIT ORAL
Qty: 53 TABLET | Refills: 0 | OUTPATIENT
Start: 2020-11-11

## 2020-11-11 RX ORDER — VARENICLINE TARTRATE 1 MG/1
1 TABLET, FILM COATED ORAL 2 TIMES DAILY
Qty: 60 TABLET | Refills: 1 | Status: SHIPPED | OUTPATIENT
Start: 2020-11-11 | End: 2020-12-07 | Stop reason: SDUPTHER

## 2020-11-16 ENCOUNTER — TELEPHONE (OUTPATIENT)
Dept: PSYCHIATRY | Facility: CLINIC | Age: 55
End: 2020-11-16

## 2020-11-16 DIAGNOSIS — F33.0 MILD EPISODE OF RECURRENT MAJOR DEPRESSIVE DISORDER (HCC): Primary | ICD-10-CM

## 2020-11-17 ENCOUNTER — TELEPHONE (OUTPATIENT)
Dept: PSYCHIATRY | Facility: CLINIC | Age: 55
End: 2020-11-17

## 2020-11-18 ENCOUNTER — TELEPHONE (OUTPATIENT)
Dept: PSYCHIATRY | Facility: CLINIC | Age: 55
End: 2020-11-18

## 2020-11-24 ENCOUNTER — OFFICE VISIT (OUTPATIENT)
Dept: SLEEP CENTER | Facility: CLINIC | Age: 55
End: 2020-11-24
Payer: COMMERCIAL

## 2020-11-24 VITALS
DIASTOLIC BLOOD PRESSURE: 60 MMHG | HEIGHT: 68 IN | HEART RATE: 70 BPM | WEIGHT: 186 LBS | SYSTOLIC BLOOD PRESSURE: 98 MMHG | BODY MASS INDEX: 28.19 KG/M2

## 2020-11-24 DIAGNOSIS — G47.33 OSA (OBSTRUCTIVE SLEEP APNEA): Primary | ICD-10-CM

## 2020-11-24 PROCEDURE — 4004F PT TOBACCO SCREEN RCVD TLK: CPT | Performed by: INTERNAL MEDICINE

## 2020-11-24 PROCEDURE — 3008F BODY MASS INDEX DOCD: CPT | Performed by: INTERNAL MEDICINE

## 2020-11-24 PROCEDURE — 99213 OFFICE O/P EST LOW 20 MIN: CPT | Performed by: INTERNAL MEDICINE

## 2020-12-01 ENCOUNTER — OFFICE VISIT (OUTPATIENT)
Dept: PSYCHIATRY | Facility: CLINIC | Age: 55
End: 2020-12-01
Payer: COMMERCIAL

## 2020-12-01 DIAGNOSIS — N52.2 DRUG-INDUCED ERECTILE DYSFUNCTION: ICD-10-CM

## 2020-12-01 DIAGNOSIS — F41.9 ANXIETY: ICD-10-CM

## 2020-12-01 DIAGNOSIS — F33.0 MILD EPISODE OF RECURRENT MAJOR DEPRESSIVE DISORDER (HCC): Primary | ICD-10-CM

## 2020-12-01 PROCEDURE — 90833 PSYTX W PT W E/M 30 MIN: CPT | Performed by: STUDENT IN AN ORGANIZED HEALTH CARE EDUCATION/TRAINING PROGRAM

## 2020-12-01 PROCEDURE — 99214 OFFICE O/P EST MOD 30 MIN: CPT | Performed by: STUDENT IN AN ORGANIZED HEALTH CARE EDUCATION/TRAINING PROGRAM

## 2020-12-02 DIAGNOSIS — Z72.0 TOBACCO ABUSE: ICD-10-CM

## 2020-12-04 RX ORDER — VARENICLINE TARTRATE 1 MG/1
1 TABLET, FILM COATED ORAL 2 TIMES DAILY
Qty: 60 TABLET | Refills: 1 | OUTPATIENT
Start: 2020-12-04

## 2020-12-07 ENCOUNTER — TELEPHONE (OUTPATIENT)
Dept: PSYCHIATRY | Facility: CLINIC | Age: 55
End: 2020-12-07

## 2020-12-07 DIAGNOSIS — Z72.0 TOBACCO ABUSE: ICD-10-CM

## 2020-12-08 RX ORDER — VARENICLINE TARTRATE 1 MG/1
1 TABLET, FILM COATED ORAL 2 TIMES DAILY
Qty: 120 TABLET | Refills: 0 | Status: SHIPPED | OUTPATIENT
Start: 2020-12-08 | End: 2022-01-10

## 2020-12-09 ENCOUNTER — TELEPHONE (OUTPATIENT)
Dept: FAMILY MEDICINE CLINIC | Facility: CLINIC | Age: 55
End: 2020-12-09

## 2020-12-14 ENCOUNTER — OFFICE VISIT (OUTPATIENT)
Dept: FAMILY MEDICINE CLINIC | Facility: CLINIC | Age: 55
End: 2020-12-14
Payer: COMMERCIAL

## 2020-12-14 ENCOUNTER — TELEPHONE (OUTPATIENT)
Dept: PSYCHIATRY | Facility: CLINIC | Age: 55
End: 2020-12-14

## 2020-12-14 VITALS
HEIGHT: 68 IN | BODY MASS INDEX: 28.4 KG/M2 | RESPIRATION RATE: 16 BRPM | HEART RATE: 76 BPM | WEIGHT: 187.4 LBS | DIASTOLIC BLOOD PRESSURE: 80 MMHG | SYSTOLIC BLOOD PRESSURE: 124 MMHG | TEMPERATURE: 97.1 F | OXYGEN SATURATION: 98 %

## 2020-12-14 DIAGNOSIS — F33.0 MILD EPISODE OF RECURRENT MAJOR DEPRESSIVE DISORDER (HCC): Primary | ICD-10-CM

## 2020-12-14 DIAGNOSIS — F41.9 ANXIETY: Primary | ICD-10-CM

## 2020-12-14 DIAGNOSIS — F33.0 MILD EPISODE OF RECURRENT MAJOR DEPRESSIVE DISORDER (HCC): ICD-10-CM

## 2020-12-14 DIAGNOSIS — Z72.0 TOBACCO ABUSE: ICD-10-CM

## 2020-12-14 PROCEDURE — 99406 BEHAV CHNG SMOKING 3-10 MIN: CPT | Performed by: PHYSICIAN ASSISTANT

## 2020-12-14 PROCEDURE — 3008F BODY MASS INDEX DOCD: CPT | Performed by: PHYSICIAN ASSISTANT

## 2020-12-14 PROCEDURE — 99213 OFFICE O/P EST LOW 20 MIN: CPT | Performed by: PHYSICIAN ASSISTANT

## 2020-12-14 PROCEDURE — 4004F PT TOBACCO SCREEN RCVD TLK: CPT | Performed by: PHYSICIAN ASSISTANT

## 2020-12-28 RX ORDER — FLUOXETINE HYDROCHLORIDE 20 MG/1
20 CAPSULE ORAL DAILY
Qty: 90 CAPSULE | Refills: 0 | Status: SHIPPED | OUTPATIENT
Start: 2020-12-28 | End: 2021-02-08 | Stop reason: SDUPTHER

## 2021-02-08 ENCOUNTER — OFFICE VISIT (OUTPATIENT)
Dept: PSYCHIATRY | Facility: CLINIC | Age: 56
End: 2021-02-08
Payer: COMMERCIAL

## 2021-02-08 DIAGNOSIS — N52.2 DRUG-INDUCED ERECTILE DYSFUNCTION: ICD-10-CM

## 2021-02-08 DIAGNOSIS — F33.0 MILD EPISODE OF RECURRENT MAJOR DEPRESSIVE DISORDER (HCC): Primary | ICD-10-CM

## 2021-02-08 DIAGNOSIS — F41.9 ANXIETY: ICD-10-CM

## 2021-02-08 PROCEDURE — 99215 OFFICE O/P EST HI 40 MIN: CPT | Performed by: STUDENT IN AN ORGANIZED HEALTH CARE EDUCATION/TRAINING PROGRAM

## 2021-02-08 PROCEDURE — 90833 PSYTX W PT W E/M 30 MIN: CPT | Performed by: STUDENT IN AN ORGANIZED HEALTH CARE EDUCATION/TRAINING PROGRAM

## 2021-02-08 RX ORDER — FLUOXETINE HYDROCHLORIDE 20 MG/1
20 CAPSULE ORAL DAILY
Qty: 90 CAPSULE | Refills: 0 | Status: SHIPPED | OUTPATIENT
Start: 2021-02-08 | End: 2021-04-27

## 2021-02-08 NOTE — PSYCH
MEDICATION MANAGEMENT NOTE        [address]      Name and Date of Birth:  Roland Ramos 63 y o  1965 MRN: [id number]    Date of Visit: February 8, 2021    Reason for Visit: Follow-up visit for medication management     SUBJECTIVE:    Roland Ramos is a 63 y o  male with past psychiatric history significant for major depressive disorder who was personally seen and evaluated today at the [address] [place] [address] outpatient clinic for follow-up and medication management  Moshe Golden presents as calm, pleasant, and cooperative  His thoughts are organized, linear, and goal-directed and he completes psychiatric assessment without difficulty  Moshe Golden was started on Prozac in mid-December as his insurance coverage did not accept use of Trintellix  He endorses ongoing compliance with Prozac and currently denies adverse medication side effects  He states that "the medication feels like it's working"  He previously experienced adverse sexual dysfunction in the past when treated with Zoloft  He denies current side effects but admits to limited intimacy with his girlfriend secondary to ongoing relationship discord  Moshe Golden continues to report that his girlfriend is devaluing and antagonistic  They have limited communication as a result  He was encouraged to pursue couples therapy and states that he will present this option to his girlfriend in the near future  Acutely, [place] endorses adequate and restorative sleep  His appetite is at baseline  He denies any recent changes in energy, motivation, or concentration  He continues to function well in the community and finds pleasure in working for the [employer]  Moshe Golden currently denies SI/HI or any plans to harm himself or others  He is future-oriented and demonstrates self-preservation as evidenced by his ongoing committment to mental health treatment   Last session, he was encouraged to seek out group counseling via 's "Coping with Loss" community based group  He has yet to do so but will call this week  Nikki Stubbs currently denies any symptomatology suggestive of misael/hypomania  He denies acute perceptual disturbances and does not exhibit objective evidence of sampson psychosis  He offers no further complaints  Current Rating Scores:     None completed today  Review Of Systems:      Constitutional negative   ENT negative   Cardiovascular negative   Respiratory negative   Gastrointestinal negative   Genitourinary negative   Musculoskeletal negative   Integumentary negative   Neurological negative   Endocrine negative   Other Symptoms none, all other systems are negative       Past Psychiatric History: (unchanged information from previous note copied and italicized) - Information that is bolded has been updated       Inpatient psychiatric admissions: Denies  Prior outpatient psychiatric linkage: Denies - received Sertraline from PCP  Past/current psychotherapy: Denies  History of suicidal attempts/gestures: Denies  History of violence/aggressive behaviors: Denies  Psychotropic medication trials: Sertraline 100mg Daily (experiencing ED), Wellbutrin (over activated, more anxious), Trintellix, Prozac (now)  Substance abuse inpatient/outpatient rehabilitation: Denies     Substance Abuse History: (unchanged information from previous note copied and italicized) - Information that is bolded has been updated       No history of ETOH or illict substance  He does endorse a use of tobacco abuse (previous 2pack/day smoker)  No past legal actions or arrests secondary to substance intoxication  The patient denies prior DWIs/DUIs   No history of outpatient/inpatient rehabilitation programs  [place] does not exhibit objective evidence of substance withdrawal during today's examination nor does [place] appear under the influence of any psychoactive substance           Social History: (unchanged information from previous note copied and italicized) - Information that is bolded has been updated       Developmental: Denies a history of milestone/developmental delay  Denies a history of in-utero exposure to toxins/illicit substances  There is no documented history of IEP or need for special education  Education: high school diploma/GED - graduated from [employer]  Marital history:  - He had 2 sons with his ex-wife  He is now involved in a new relationship    Living arrangement, social support: significant other  He is still in contact with his younger son (24years old) who he sees weekly    Occupational History: Employed as a  - works 5 days per week (2 days, 2 nights, and 1 midday shift)  Access to firearms: Denies direct access to weapons/firearms  [place] [employer] no history of arrests or violence with a deadly weapon          Traumatic History: (unchanged information from previous note copied and italicized) - Information that is bolded has been updated       Abuse:none is reported  Other Traumatic Events: Tragically lost his son to a MVA and his mother to cancer in 2018        Past Medical History:    Past Medical History:   Diagnosis Date    Depression     Sleep apnea      No past medical history pertinent negatives    Past Surgical History:   Procedure Laterality Date    TESTICLE SURGERY       No Known Allergies    Substance Abuse History:    Social History     Substance and Sexual Activity   Alcohol Use Yes     Social History     Substance and Sexual Activity   Drug Use No       Social History:    Social History     Socioeconomic History    Marital status: Single     Spouse name: Not on file    Number of children: Not on file    Years of education: Not on file    Highest education level: Not on file   Occupational History    Not on file   Social Needs    Financial resource strain: Not on file    Food insecurity     Worry: Not on file     Inability: Not on file    Transportation needs     Medical: Not on file     Non-medical: Not on file   Tobacco Use    Smoking status: Current Every Day Smoker     Packs/day: 1 50    Smokeless tobacco: Current User   Substance and Sexual Activity    Alcohol use: Yes    Drug use: No    Sexual activity: Yes     Partners: Female   Lifestyle    Physical activity     Days per week: Not on file     Minutes per session: Not on file    Stress: Not on file   Relationships    Social connections     Talks on phone: Not on file     Gets together: Not on file     Attends Christian service: Not on file     Active member of club or organization: Not on file     Attends meetings of clubs or organizations: Not on file     Relationship status: Not on file    Intimate partner violence     Fear of current or ex partner: Not on file     Emotionally abused: Not on file     Physically abused: Not on file     Forced sexual activity: Not on file   Other Topics Concern    Not on file   Social History Narrative    Not on file       Family Psychiatric History:     Family History   Problem Relation Age of Onset    Anxiety disorder Father         Previously on Valium    Depression Father        History Review: The following portions of the patient's history were reviewed and updated as appropriate: allergies, current medications, past family history, past medical history, past social history, past surgical history and problem list          OBJECTIVE:     Vital signs in last 24 hours: There were no vitals filed for this visit      Mental Status Evaluation:    Appearance age appropriate, casually dressed, dressed appropriately, looks stated age   Behavior pleasant, cooperative, calm, good eye contact   Speech normal rate, normal volume, normal pitch, fluent, clear   Mood euthymic   Affect normal range and intensity, appropriate   Thought Processes organized, goal directed, normal rate of thoughts, normal abstract reasoning   Associations intact associations   Thought Content no overt delusions Perceptual Disturbances: no auditory hallucinations, no visual hallucinations   Abnormal Thoughts  Risk Potential Suicidal ideation - None  Homicidal ideation - None  Potential for aggression - No   Orientation oriented to person, place, time/date and situation   Memory recent and remote memory grossly intact   Consciousness alert and awake   Attention Span Concentration Span attention span and concentration are age appropriate   Intellect appears to be of average intelligence   Insight intact and good   Judgement intact and good   Muscle Strength and  Gait normal muscle strength and normal muscle tone, normal gait and normal balance   Motor activity no abnormal movements   Language no difficulty naming common objects, no difficulty repeating a phrase   Fund of Knowledge adequate knowledge of current events  adequate fund of knowledge regarding past history  adequate fund of knowledge regarding vocabulary    Pain none   Pain Scale 0       Laboratory Results: I have personally reviewed all pertinent laboratory/tests results    Recent Labs (last 2 months):   No visits with results within 2 Month(s) from this visit     Latest known visit with results is:   Orders Only on 03/04/2020   Component Date Value    Sodium 09/04/2020 142     Potassium 09/04/2020 4 0     Chloride 09/04/2020 111*    CO2 09/04/2020 26     ANION GAP 09/04/2020 5     BUN 09/04/2020 15     Creatinine 09/04/2020 0 95     Glucose, Fasting 09/04/2020 102*    Calcium 09/04/2020 9 2     AST 09/04/2020 11     ALT 09/04/2020 22     Alkaline Phosphatase 09/04/2020 68     Total Protein 09/04/2020 7 0     Albumin 09/04/2020 3 7     Total Bilirubin 09/04/2020 0 32     eGFR 09/04/2020 89     Cholesterol 09/04/2020 198     Triglycerides 09/04/2020 101     HDL, Direct 09/04/2020 43     LDL Calculated 09/04/2020 135*    Non-HDL-Chol (CHOL-HDL) 09/04/2020 155        Suicide/Homicide Risk Assessment:    Risk of Harm to Self:  The following ratings are based on assessment at the time of the interview, observation over the last 3 months and review of records  Demographic risk factors include: , male, age: over 48 or older  Historical Risk Factors include: history of depression  Recent Specific Risk Factors include: diagnosis of depression  Protective Factors: no current suicidal ideation, ability to adapt to change, access to mental health treatment, compliant with medications, compliant with mental health treatment, connection to community, effective coping skills, effective decision-making skills, effective problem solving skills, good self-esteem, having a desire to be alive, having a sense of purpose or meaning in life, impulse control, medical compliance, no substance use problems, opportunities to contribute to community, opportunities to participate in community, personal beliefs about the meaning and value of life, resiliency, restricted access to lethal means, stable living environment, stable job, sense of personal control, sobriety, supportive family, supportive friends  Weapons: none  The following steps have been taken to ensure weapons are properly secured: not applicable  Based on today's assessment, Mark Hemphill presents the following risk of harm to self: none    Risk of Harm to Others: The following ratings are based on assessment at the time of the interview, observation over the last 3 months and review of records  Demographic Risk Factors include: male  Historical Risk Factors include: none  Recent Specific Risk Factors include: none  Protective Factors: no current homicidal ideation  Weapons: none   The following steps have been taken to ensure weapons are properly secured: not applicable  Based on today's assessment, Mark Hemphill presents the following risk of harm to others: none    The following interventions are recommended: no intervention changes needed      Lethality Statement:    Unchanged from previous assessment      Assessment/Plan:     Zoya Hitchcock is a 63 y o  male with past psychiatric history significant for major depressive disorder, complicated bereheavement, and nicotine use disorder who was personally seen and evaluated today at the [address] [place] [address] outpatient clinic for follow-up and medication management  [place] endorses a neurotypical history without mood symptomatology or anxiety prior to 2 years ago  He denies prior psychiatric treatment or counseling before 2018 and no previous medication trials  Approximately two years ago, [place]'s oldest son was killed in a MVA after hydroplaning during a wet evening  Approximately 3-6 months after his death, [place]'s mother succumbed to a terminal illness (cancer)  These two significant losses were the catalyst for new-onset depressive symptomatology  Over the past 1 5-2 years, Landon Sung admits to episodic sadness and intermittent feelings of loss  These symptoms intensified since March/April 2020, likely secondary to COVID-19, mandatory quearentine, and subsequent disconnect from others  In June 2020, during an office visit with his PCP, Landon Sung endorsed worsening depression and anxiety and was started on Sertraline which was titrated to 100mg Daily  He endorsed improvement in mood and resolution in daily anxiety, however, he was experiencing sexual dysfunction (erectile dysfunction) which was problematic  As such, he was tapered of Zoloft and Wellbutrin was started which was over-stimulating and induced anxiety  This agent was subsequently discontinued and Landon Sung was started on Trintellix, which his insurance did not cover  Today, he remains on Prozac and is tolerating it well      Psychopharmacologically, [place] endorses ongoing mood stability and does not want to change current dose of Prozac         DSM-V Diagnoses:      1 ) Major Depressive Episode, recurrent, in partial remission   2 ) Nicotine Use Disorder  3 ) Erectile Dysfunction (drug-induced)        Treatment Recommendations/Precautions:     1 ) Major Depressive Episode, recurrent, in partial remission   - Hx of medication trials including: Sertraline (tolerated well but experience sexual dysfunction), Wellbutrin (too activating)  - Continue Prozac 20mg Daily  - Discussed referral for psychotherapy - reluctant at this juncture  - Encouraged to pursue couples therapy with his girlfriend   - Given information to attend "Coping with loss" group at [facility] provided regarding the importance of exercise and healthy dietary choices and their impact on mood, energy, and motivation  - Counseled to avoid ETOH, illict substances, and nicotine secondary to the detrimental effects of these substances on mental and physical health     2  ) Nicotine Use Disorder  - States that he previously smoked 2 packs per day - he is currently smoking 5-6 cigarettes per day  - Continue Chantix Rx by PCP  - Psychoeducation provided regarding the importance of exercise and healthy dietary choices and their impact on mood, energy, and motivation  - Counseled to avoid ETOH, illict substances, and nicotine secondary to the detrimental effects of these substances on mental and physical health     3  ) Erectile Dysfunction (drug-induced)  - Hx of medication trials including: Sertraline (tolerated well but experience sexual dysfunction)  - Sertraline discontinued, Trintellix was started but he could not afford co-pay  - PRN Viagra Rx by PCP   - Psychoeducation provided regarding the importance of exercise and healthy dietary choices and their impact on mood, energy, and motivation  - Counseled to avoid ETOH, illict substances, and nicotine secondary to the detrimental effects of these substances on mental and physical health          Medication management every 10 weeks  Aware of need to follow up with family physician for medical issues  Aware of 24 hour and weekend coverage for urgent situations accessed by calling [facility] main practice number    Medications Risks/Benefits      Risks, Benefits And Possible Side Effects Of Medications:    Risks, benefits, and possible side effects of medications explained to Leslie Cabrera including risk of suicidality and serotonin syndrome related to treatment with antidepressants  He verbalizes understanding and agreement for treatment  Controlled Medication Discussion:     No recent records found for controlled prescriptions according to [facility] Pres[facility] Monitoring [facility]    Psychotherapy Provided:     Individual psychotherapy provided: Yes  Counseling was provided during the session today for 17 minutes  Medication education provided to [address]  Goals discussed during in session: maintain control of depression  Coping skills reviewed with [place]  Importance of follow up with family physician for medical issues reviewed with Leslie Cabrera  Supportive therapy provided  Cognitive therapy was utilized during the session       Treatment Plan:    Completed and signed during the session: Not applicable - Treatment Plan not due at this session    Alysha Guevara MD 02/08/21

## 2021-03-10 ENCOUNTER — OFFICE VISIT (OUTPATIENT)
Dept: SLEEP CENTER | Facility: CLINIC | Age: 56
End: 2021-03-10
Payer: COMMERCIAL

## 2021-03-10 VITALS
WEIGHT: 187 LBS | DIASTOLIC BLOOD PRESSURE: 68 MMHG | HEART RATE: 86 BPM | BODY MASS INDEX: 28.34 KG/M2 | HEIGHT: 68 IN | SYSTOLIC BLOOD PRESSURE: 116 MMHG

## 2021-03-10 DIAGNOSIS — G47.33 OSA (OBSTRUCTIVE SLEEP APNEA): Primary | ICD-10-CM

## 2021-03-10 PROCEDURE — 99214 OFFICE O/P EST MOD 30 MIN: CPT | Performed by: INTERNAL MEDICINE

## 2021-03-10 PROCEDURE — 4004F PT TOBACCO SCREEN RCVD TLK: CPT | Performed by: INTERNAL MEDICINE

## 2021-03-10 NOTE — PROGRESS NOTES
Progress Note - Sleep Center   Geraldo Due :1965 MRN: [id number]      Reason for Visit:  63 y o male here for annual follow-up    Assessment:  Doing well on current therapy of  APAP 14 to 20 cm for  Severe obstructive sleep apnea ( AHI = 60 0), although he continues to snore  I will change his pressure setting to 17 5 cm up to 20 cm,  Based on his compliance data,which I hope will eliminate snoring  If it does not, I will order a positive airway pressure retitration study  Perhaps he needs BiPAP  Plan:    Change pressure as above    Follow up: One year    History of Present Illness:  History of CECILIA on PAP therapy  Fully compliant and deriving benefit  Review of Systems      Genitourinary none   Cardiology none   Gastrointestinal none   Neurology none   Constitutional none   Integumentary none   Psychiatry none   Musculoskeletal none   Pulmonary snoring   ENT none   Endocrine none   Hematological none           I have reviewed and updated the review of systems as necessary      Historical Information    Past Medical History:   Past Medical History:   Diagnosis Date    Depression     Sleep apnea          Past Surgical History:   Past Surgical History:   Procedure Laterality Date    TESTICLE SURGERY         Social History:   Social History     Socioeconomic History    Marital status: Single     Spouse name: None    Number of children: None    Years of education: None    Highest education level: None   Occupational History    None   Social Needs    Financial resource strain: None    Food insecurity     Worry: None     Inability: None    Transportation needs     Medical: None     Non-medical: None   Tobacco Use    Smoking status: Current Every Day Smoker     Packs/day: 1 50    Smokeless tobacco: Current User   Substance and Sexual Activity    Alcohol use:  Yes    Drug use: No    Sexual activity: Yes     Partners: Female   Lifestyle    Physical activity     Days per week: None     Minutes per session: None    Stress: None   Relationships    Social connections     Talks on phone: None     Gets together: None     Attends Muslim service: None     Active member of club or organization: None     Attends meetings of clubs or organizations: None     Relationship status: None    Intimate partner violence     Fear of current or ex partner: None     Emotionally abused: None     Physically abused: None     Forced sexual activity: None   Other Topics Concern    None   Social History Narrative    None       Family History:   Family History   Problem Relation Age of Onset    Anxiety disorder Father         Previously on Valium    Depression Father        Medications/Allergies:      Current Outpatient Medications:     FLUoxetine (PROzac) 20 mg capsule, Take 1 capsule (20 mg total) by mouth daily, Disp: 90 capsule, Rfl: 0    sildenafil (VIAGRA) 100 mg tablet, Take 1 tablet (100 mg total) by mouth daily as needed for erectile dysfunction, Disp: 10 tablet, Rfl: 2    varenicline (CHANTIX) 1 mg tablet, Take 1 tablet (1 mg total) by mouth 2 (two) times a day (Patient not taking: Reported on 3/10/2021), Disp: 120 tablet, Rfl: 0          Objective      Vital Signs:   Vitals:    03/10/21 1300   BP: 116/68   Pulse: 86     Felda Sleepiness Scale: Total score: 4        Physical Exam:    General: Alert, appropriate, cooperative, overweight    Head: NC/AT    Skin: Warm, dry    Neuro: No motor abnormalities, cranial nerves appear intact    Extremity: No clubbing, cyanosis      DME Provider: [facility]        Counseling / Coordination of Care   I have spent  15 minutes with the patient today in which greater than 50% of this time was spent in counseling/coordination of care regarding: equipment and compliance  Board Certified Sleep Specialist    Portions of the record may have been created with voice recognition software    Occasional wrong word or "sound a like" substitutions may have occurred due to the inherent limitations of voice recognition software  Read the chart carefully and recognize, using context, where substitutions have occurred

## 2021-03-11 ENCOUNTER — TELEPHONE (OUTPATIENT)
Dept: SLEEP CENTER | Facility: CLINIC | Age: 56
End: 2021-03-11

## 2021-03-26 ENCOUNTER — TELEPHONE (OUTPATIENT)
Dept: SLEEP CENTER | Facility: CLINIC | Age: 56
End: 2021-03-26

## 2021-03-26 ENCOUNTER — TELEPHONE (OUTPATIENT)
Dept: PSYCHIATRY | Facility: CLINIC | Age: 56
End: 2021-03-26

## 2021-03-26 DIAGNOSIS — G47.33 OSA (OBSTRUCTIVE SLEEP APNEA): Primary | ICD-10-CM

## 2021-03-26 DIAGNOSIS — Z01.812 ENCOUNTER FOR PREPROCEDURE SCREENING LABORATORY TESTING FOR COVID-19: Primary | ICD-10-CM

## 2021-03-26 DIAGNOSIS — Z20.822 ENCOUNTER FOR PREPROCEDURE SCREENING LABORATORY TESTING FOR COVID-19: Primary | ICD-10-CM

## 2021-03-26 NOTE — TELEPHONE ENCOUNTER
Patient called inquiring where he could go for hypnotic therapy to quit smoking and for anger management

## 2021-03-26 NOTE — TELEPHONE ENCOUNTER
Received call from patient who states he continues to have snoring even after the pressure was increased on his machine on his machine  Will obtain compliance from Kamila and scan to media for Dr Corina Hoffman please see compliance when available and advise

## 2021-03-29 NOTE — TELEPHONE ENCOUNTER
I do not have specific recommendations for hypnotic therapy  Patient should contact his insurance provider and see what individual psychotherapists would be covered under his policy and seek linkage through that organization  He can also go on psychologytoday  com and find providers  I've preached the likely benefit of individual therapy for him since intake session  This would ultimately prove to be advantageous for his anger management as well

## 2021-03-29 NOTE — TELEPHONE ENCOUNTER
Advised patient Dr Sheyla Holland recommends BIPAP study  Patient scheduled for studyDiscussed COVID protocol:  Patient agreeable to have COVID test on 5/17/2021, for PAP study on 5/27/2021  We ask that you limit interpersonal interactions as much as possible and observe all social distancing and masking precautions from the time of your testing to the time of your study    Letter sent   Covid order placed

## 2021-03-30 ENCOUNTER — TELEPHONE (OUTPATIENT)
Dept: SLEEP CENTER | Facility: CLINIC | Age: 56
End: 2021-03-30

## 2021-04-05 ENCOUNTER — TELEPHONE (OUTPATIENT)
Dept: FAMILY MEDICINE CLINIC | Facility: CLINIC | Age: 56
End: 2021-04-05

## 2021-04-05 DIAGNOSIS — F33.0 MILD EPISODE OF RECURRENT MAJOR DEPRESSIVE DISORDER (HCC): ICD-10-CM

## 2021-04-05 DIAGNOSIS — E78.2 MIXED HYPERLIPIDEMIA: Primary | ICD-10-CM

## 2021-04-05 NOTE — TELEPHONE ENCOUNTER
Please let him know that I did put fasting lab orders in the system for him to complete prior to his appointment    Thank you

## 2021-04-19 ENCOUNTER — OFFICE VISIT (OUTPATIENT)
Dept: FAMILY MEDICINE CLINIC | Facility: CLINIC | Age: 56
End: 2021-04-19
Payer: COMMERCIAL

## 2021-04-19 VITALS
HEART RATE: 70 BPM | SYSTOLIC BLOOD PRESSURE: 120 MMHG | RESPIRATION RATE: 16 BRPM | TEMPERATURE: 97.6 F | WEIGHT: 190.4 LBS | HEIGHT: 68 IN | DIASTOLIC BLOOD PRESSURE: 78 MMHG | BODY MASS INDEX: 28.85 KG/M2 | OXYGEN SATURATION: 97 %

## 2021-04-19 DIAGNOSIS — F41.9 ANXIETY: ICD-10-CM

## 2021-04-19 DIAGNOSIS — F33.0 MILD EPISODE OF RECURRENT MAJOR DEPRESSIVE DISORDER (HCC): ICD-10-CM

## 2021-04-19 DIAGNOSIS — Z72.0 TOBACCO ABUSE: ICD-10-CM

## 2021-04-19 DIAGNOSIS — G47.30 SLEEP APNEA WITH USE OF CONTINUOUS POSITIVE AIRWAY PRESSURE (CPAP): ICD-10-CM

## 2021-04-19 DIAGNOSIS — E78.2 MIXED HYPERLIPIDEMIA: Primary | ICD-10-CM

## 2021-04-19 PROCEDURE — 99214 OFFICE O/P EST MOD 30 MIN: CPT | Performed by: PHYSICIAN ASSISTANT

## 2021-04-19 PROCEDURE — 99406 BEHAV CHNG SMOKING 3-10 MIN: CPT | Performed by: PHYSICIAN ASSISTANT

## 2021-04-19 PROCEDURE — 3008F BODY MASS INDEX DOCD: CPT | Performed by: PHYSICIAN ASSISTANT

## 2021-04-19 NOTE — PROGRESS NOTES
Assessment/Plan:     -I did advise him to proceed with the fasting lab orders in the system over the next week   - I did recommend he drop off his fit test at the time that he has is blood work   -continue follow-up with Psychiatry as advised   -continue follow-up with sleep medicine as advised  -continue with CPAP   - he will let me know if he will consider Chantix treatment for his smoking  He does smoke 1-1 and half packs of cigarettes daily   - I did recommend follow-up in September [employer] software was used to dictate this note  It may contain errors with dictating incorrect words/spelling  Please contact provider directly for any questions  BMI Counseling: Body mass index is 28 95 kg/m²  The BMI is above normal  Nutrition recommendations include reducing portion sizes and decreasing overall calorie intake  Exercise recommendations include exercising 3-5 times per week  Tobacco Cessation Counseling: Tobacco cessation counseling and education was provided  The patient is sincerely urged to quit consumption of tobacco  He is not ready to quit tobacco  The numerous health risks of tobacco consumption were discussed  patient is currently smoking 1-1 and half packs daily  He is not ready to quit at this time  He will let me know when he is ready and may reconsider Chantix  This was discussed for about 5 minutes  Diagnoses and all orders for this visit:    Mixed hyperlipidemia    Mild episode of recurrent major depressive disorder (HCC)    Anxiety    Sleep apnea with use of continuous positive airway pressure (CPAP)    Tobacco abuse    BMI 28 0-28 9,adult          Subjective:      Patient ID: Chayo Simpson is a 61 y o  male  Patient presents today for follow-up of hyperlipidemia, depression, anxiety  He states that he did not realize that there were lab orders in the system  He does continue with Psychiatry for depression / anxiety which is well controlled on fluoxetine    He states he has a tendency to still take a nap during the day and he is following with the sleep specialist   He states that the titration son his CPAP have been increased but he still snoring in feels fatigued during the day  He does have an upcoming sleep study appointment  He states he takes a nap after work  He does not feel fatigued while driving  He denies any chest pain, shortness of breath, swelling of his lower extremities, changes in his bowels including blood  He still has the fit test at home  The following portions of the patient's history were reviewed and updated as appropriate:   He  has a past medical history of Depression and Sleep apnea  He   Patient Active Problem List    Diagnosis Date Noted    BMI 28 0-28 9,adult 04/19/2021    Mild episode of recurrent major depressive disorder ([facility] [address] ) 10/12/2020    Complaint of nasal congestion 10/12/2020    Anxiety 06/29/2020    Erectile dysfunction 06/08/2020    Preop examination 01/09/2020    Cataract 01/09/2020    Mixed hyperlipidemia 01/09/2020    Tobacco abuse 08/12/2019    Sleep apnea with use of continuous positive airway pressure (CPAP) 08/12/2019    Well adult exam 08/12/2019    Colon cancer screening 08/12/2019     He  has a past surgical history that includes Testicle surgery  His family history includes Anxiety disorder in his father; Depression in his father  He  reports that he has been smoking  He has been smoking about 1 50 packs per day  He uses smokeless tobacco  He reports current alcohol use  He reports that he does not use drugs    Current Outpatient Medications   Medication Sig Dispense Refill    FLUoxetine (PROzac) 20 mg capsule Take 1 capsule (20 mg total) by mouth daily 90 capsule 0    sildenafil (VIAGRA) 100 mg tablet Take 1 tablet (100 mg total) by mouth daily as needed for erectile dysfunction 10 tablet 2    varenicline (CHANTIX) 1 mg tablet Take 1 tablet (1 mg total) by mouth 2 (two) times a day (Patient not taking: Reported on 3/10/2021) 120 tablet 0     No current facility-administered medications for this visit  Current Outpatient Medications on File Prior to Visit   Medication Sig    FLUoxetine (PROzac) 20 mg capsule Take 1 capsule (20 mg total) by mouth daily    sildenafil (VIAGRA) 100 mg tablet Take 1 tablet (100 mg total) by mouth daily as needed for erectile dysfunction    varenicline (CHANTIX) 1 mg tablet Take 1 tablet (1 mg total) by mouth 2 (two) times a day (Patient not taking: Reported on 3/10/2021)     No current facility-administered medications on file prior to visit  He has No Known Allergies       Review of Systems   Constitutional: Negative  Respiratory: Negative for cough and shortness of breath  Cardiovascular: Negative for chest pain and leg swelling  Gastrointestinal: Negative for abdominal pain and blood in stool  Psychiatric/Behavioral:          As stated in HPI         Objective:      /78 (BP Location: Left arm, Patient Position: Sitting, Cuff Size: Standard)   Pulse 70   Temp 97 6 °F (36 4 °C) (Tympanic)   Resp 16   Ht 5' 8" (1 727 m)   Wt 86 4 kg (190 lb 6 4 oz)   SpO2 97%   BMI 28 95 kg/m²          Physical Exam  Constitutional:       General: He is not in acute distress  Appearance: Normal appearance  He is well-developed  He is not ill-appearing, toxic-appearing or diaphoretic  HENT:      Head: Normocephalic and atraumatic  Right Ear: Tympanic membrane, ear canal and external ear normal       Left Ear: Tympanic membrane, ear canal and external ear normal    Neck:      Musculoskeletal: Normal range of motion and neck supple  Thyroid: No thyromegaly  Cardiovascular:      Rate and Rhythm: Normal rate and regular rhythm  Heart sounds: Normal heart sounds  No murmur  Pulmonary:      Effort: Pulmonary effort is normal  No respiratory distress  Breath sounds: Normal breath sounds  No wheezing, rhonchi or rales     Abdominal:      General: Bowel sounds are normal       Palpations: Abdomen is soft  There is no mass  Tenderness: There is no abdominal tenderness  Musculoskeletal:      Right lower leg: No edema  Left lower leg: No edema  Lymphadenopathy:      Cervical: No cervical adenopathy  Skin:     General: Skin is warm  Neurological:      General: No focal deficit present  Mental Status: He is alert  Psychiatric:         Mood and Affect: Mood normal          Behavior: Behavior normal          Thought Content:  Thought content normal          Judgment: Judgment normal

## 2021-04-21 ENCOUNTER — TELEPHONE (OUTPATIENT)
Dept: PSYCHIATRY | Facility: CLINIC | Age: 56
End: 2021-04-21

## 2021-04-27 ENCOUNTER — OFFICE VISIT (OUTPATIENT)
Dept: PSYCHIATRY | Facility: CLINIC | Age: 56
End: 2021-04-27
Payer: COMMERCIAL

## 2021-04-27 DIAGNOSIS — F33.0 MILD EPISODE OF RECURRENT MAJOR DEPRESSIVE DISORDER (HCC): Primary | ICD-10-CM

## 2021-04-27 DIAGNOSIS — F41.9 ANXIETY: ICD-10-CM

## 2021-04-27 PROCEDURE — 90833 PSYTX W PT W E/M 30 MIN: CPT | Performed by: STUDENT IN AN ORGANIZED HEALTH CARE EDUCATION/TRAINING PROGRAM

## 2021-04-27 PROCEDURE — 99213 OFFICE O/P EST LOW 20 MIN: CPT | Performed by: STUDENT IN AN ORGANIZED HEALTH CARE EDUCATION/TRAINING PROGRAM

## 2021-04-27 RX ORDER — FLUOXETINE HYDROCHLORIDE 40 MG/1
40 CAPSULE ORAL DAILY
COMMUNITY
End: 2021-06-18 | Stop reason: SDUPTHER

## 2021-04-27 NOTE — BH TREATMENT PLAN
TREATMENT PLAN (Medication Management Only)        [facility]    Name and Date of Birth:  Zeina Lucas 61 y o  1965  Date of Treatment Plan: April 27, 2021  Diagnosis/Diagnoses:    1  Mild episode of recurrent major depressive disorder ([address] )    2  Anxiety      Strengths/Personal Resources for Self-Care: supportive family, supportive friends, taking medications as prescribed, ability to adapt to life changes, ability to communicate needs, ability to communicate well, ability to listen, ability to reason, ability to understand psychiatric illness, good physical health, good understanding of illness, independence, motivation for treatment, ability to negotiate basic needs, self-reliance, sense of humor, special hobby/interest, stable employment, willingness to work on problems  Area/Areas of need (in own words): anxiety symptoms, depressive symptoms - "relationship problems, I'm always getting yelled at"  1  Long Term Goal: alleviate anxiety, improve discord between he and GF  Target Date:6 months - 10/27/2021  Person/Persons responsible for completion of goal: [place] May  Short Term Objective (s) - How will we reach this goal?:   A  Provider new recommended medication/dosage changes and/or continue medication(s): continue current medications as prescribed  B  Attend medication management appointments regularly  C  Take psychiatric medications responsibly  D> Start hypnosis for smoking cessation  E  Begin exercising, eating healthier   Target Date:6 months - 10/27/2021  Person/Persons Responsible for Completion of Goal: [place]  Progress Towards Goals: continuing treatment  Treatment Modality: medication management every 2 months  Review due 180 days from date of this plan: 6 months - 10/27/2021  Expected length of service: ongoing treatment  My Physician/PA/NP and I have developed this plan together and I agree to work on the goals and objectives   I understand the treatment goals that were developed for my treatment  Treatment Plan completed with assistance and input from patient and verbal consent provided  Treatment plan was not signed at time of office visit secondary to COVID-19 social distancing guidelines
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
## 2021-05-03 ENCOUNTER — TELEPHONE (OUTPATIENT)
Dept: PSYCHIATRY | Facility: CLINIC | Age: 56
End: 2021-05-03

## 2021-05-24 NOTE — TELEPHONE ENCOUNTER
Left message for the patient to please have COVID test done for CPAP study   Must have negative COVID test result on chart by 10 am day of study or proof of COVID vaccine

## 2021-05-26 NOTE — TELEPHONE ENCOUNTER
Spoke to patient  Advised no COVID result on chart  Patient states he has been fully vaccinated  He will email copy of card to office  Email sent to patient so that he can reply with copy of vaccine card

## 2021-05-27 ENCOUNTER — HOSPITAL ENCOUNTER (OUTPATIENT)
Dept: SLEEP CENTER | Facility: CLINIC | Age: 56
Discharge: HOME | End: 2021-05-27
Payer: COMMERCIAL

## 2021-05-27 DIAGNOSIS — G47.33 OSA (OBSTRUCTIVE SLEEP APNEA): ICD-10-CM

## 2021-05-27 PROCEDURE — 95811 POLYSOM 6/>YRS CPAP 4/> PARM: CPT

## 2021-05-28 NOTE — PROGRESS NOTES
Sleep Study Documentation    Pre-Sleep Study       Sleep testing procedure explained to patient:YES    Patient napped prior to study:YES- more than 30 minutes  Napped after 2PM: yes    Caffeine:Nightshift worker after midnight  Caffeine use:YES- coffee  6 to 18 ounces    Alcohol:Nightshift workers after 7AM: Alcohol use:NO    Typical day for patient:YES       Study Documentation    Sleep Study Indications: The patient is to be titrated on BiPAP  He is already on CPAP at home  Sleep Study: Treatment   Optimal PAP pressure: 22cm/18cm  Leak:Small  Snore:Not eliminated  REM Obtained:yes  Supplemental O2: no    Minimum SaO2 87  Baseline SaO2 95  PAP mask tried (list all) ResMed AirTouch F20 full face size medium  PAP mask choice (final) ResMed AirTouch F20 full face size medium  PAP mask type:full face  PAP pressure at which snoring was eliminated   Mode of Therapy:BiPAP  ETCO2:No  CPAP changed to BiPAP:No    Mode of Therapy:BiPAP    EKG abnormalities: no     EEG abnormalities: no    Sleep Study Recorded < 2 hours: N/A    Sleep Study Recorded > 2 hours but incomplete study: N/A    Sleep Study Recorded 6 hours but no sleep obtained: NO    Patient classification: employed       Post-Sleep Study    Medication used at bedtime or during sleep study:NO    Patient reports time it took to fall asleep:less than 20 minutes    Patient reports waking up during study:1 to 2 times  Patient reports returning to sleep without difficulty  Patient reports sleeping 6 to 8 hours without dreaming  Patient reports sleep during study:typical    Patient rated sleepiness: Not sleepy or tired    PAP treatment:yes: Post PAP treatment patient reports feeling unchanged and would wear PAP mask at home

## 2021-05-31 DIAGNOSIS — G47.33 OSA (OBSTRUCTIVE SLEEP APNEA): Primary | ICD-10-CM

## 2021-06-02 ENCOUNTER — TELEPHONE (OUTPATIENT)
Dept: SLEEP CENTER | Facility: CLINIC | Age: 56
End: 2021-06-02

## 2021-06-02 NOTE — TELEPHONE ENCOUNTER
Patient is on a CPAP  Will need testing proving patient failed on CPAP  And a Bipap machine will need to be ordered/processed

## 2021-06-02 NOTE — TELEPHONE ENCOUNTER
Left message for the patient that sleep study is resulted  Dr Aaron Webber ordered BIPAP  Patient has a machine last pressure change was 3/2021 APAP  Order will be forwarded to [facility]  from [facility]

## 2021-06-04 NOTE — TELEPHONE ENCOUNTER
Left message for the patient to call back for sleep study results  Patient needs to schedule DME set up and compliance appointments     Per Rose Deleon new machine will be required

## 2021-06-14 NOTE — TELEPHONE ENCOUNTER
Patient left message returning our call  I attempted to contact patient, left message to call office

## 2021-06-18 ENCOUNTER — OFFICE VISIT (OUTPATIENT)
Dept: PSYCHIATRY | Facility: CLINIC | Age: 56
End: 2021-06-18
Payer: COMMERCIAL

## 2021-06-18 DIAGNOSIS — F33.0 MILD EPISODE OF RECURRENT MAJOR DEPRESSIVE DISORDER (HCC): Primary | ICD-10-CM

## 2021-06-18 DIAGNOSIS — Z72.0 TOBACCO ABUSE: ICD-10-CM

## 2021-06-18 PROCEDURE — 99213 OFFICE O/P EST LOW 20 MIN: CPT | Performed by: STUDENT IN AN ORGANIZED HEALTH CARE EDUCATION/TRAINING PROGRAM

## 2021-06-18 PROCEDURE — 90833 PSYTX W PT W E/M 30 MIN: CPT | Performed by: STUDENT IN AN ORGANIZED HEALTH CARE EDUCATION/TRAINING PROGRAM

## 2021-06-18 RX ORDER — FLUOXETINE HYDROCHLORIDE 40 MG/1
40 CAPSULE ORAL DAILY
Qty: 90 CAPSULE | Refills: 0 | Status: SHIPPED | OUTPATIENT
Start: 2021-06-18 | End: 2021-09-22 | Stop reason: SDUPTHER

## 2021-06-18 NOTE — PSYCH
MEDICATION MANAGEMENT NOTE        [facility]      Name and Date of Birth:  Arnulfo Wilson 61 y o  1965 MRN: [id number]    Date of Visit: June 18, 2021    Reason for Visit: Follow-up visit for medication management     SUBJECTIVE:    Arnulfo Wilson is a 61 y o  male with past psychiatric history significant for major depressive disorder, complicated bereheavement, and nicotine use disorder who was personally seen and evaluated today at the [address] [place] [address] outpatient clinic for follow-up and medication management  Adele Moura presents as pleasant, cooperative, and engaging  His thoughts are organized, linear, and goal-directed  He completes assessment without difficulty  [place] tolerated increase dose of Prozac well  He denies adverse medication side effects  He endorses improvement in mood, irritability, and anxiousness  He states that he is less reactive with his girlfriend and their relationship as improved as a result  Law enforcement has not been involved since last visit  Adele Moura is pleased to report that he [place] [facility] and [place] about [place] move  As a result of stress related to moving, he reports increased anxiety that is not pathological and likely an adjustment to change  Adele Moura continues to endorse adequate sleep and healthy appetite  His energy and motivation have improved  He continues to function well at work  He denies lethality concern and does not harbor acute thoughts of suicide or self-harm  He has no plans to harm others  He denies irrational worry and states that anxiety has improved overall  He has no experienced recent panic symptomatology  Adele Moura tried hypnosis for smoking and states that he has reduced his use of nicotine from 2PPD to "10 cigarettes depending on the situation"  He was applauded for his efforts   He believes his smoking use with decrease further upon moving as he will be less stressed  Saadia Dinh does not currently exhibit objective evidence of misael/hypomania or psychosis  He was agreeable and highly encouraged to follow-up with sleep medicine given unmanaged CECILIA and this could worsen physical and mental health  He voiced understanding  Saadia Dinh was agreeable to continue Prozac 40mg Daily and follow-up in 3 months  Current Rating Scores:     None completed today  Review Of Systems:      Constitutional negative   ENT negative   Cardiovascular negative   Respiratory negative   Gastrointestinal negative   Genitourinary negative   Musculoskeletal back pain and arthralgias   Integumentary negative   Neurological negative   Endocrine negative   Other Symptoms none, all other systems are negative       Past Psychiatric History: (unchanged information from previous note copied and italicized) - Information that is bolded has been updated       Inpatient psychiatric admissions: Denies  Prior outpatient psychiatric linkage: Denies - received Sertraline from PCP  Past/current psychotherapy: Denies  History of suicidal attempts/gestures: Denies  History of violence/aggressive behaviors: Denies  Psychotropic medication trials: Sertraline 100mg Daily (experiencing ED), Wellbutrin (over activated, more anxious), Trintellix, Prozac (now)  Substance abuse inpatient/outpatient rehabilitation: Denies     Substance Abuse History: (unchanged information from previous note copied and italicized) - Information that is bolded has been updated       No history of ETOH or illict substance  He does endorse a use of tobacco abuse (previous 2pack/day smoker)  No past legal actions or arrests secondary to substance intoxication  The patient denies prior DWIs/DUIs   No history of outpatient/inpatient rehabilitation programs  [place] does not exhibit objective evidence of substance withdrawal during today's examination nor does [place] appear under the influence of any psychoactive substance           Social History: (unchanged information from previous note copied and italicized) - Information that is bolded has been updated       Developmental: Denies a history of milestone/developmental delay  Denies a history of in-utero exposure to toxins/illicit substances  There is no documented history of IEP or need for special education  Education: high school diploma/GED - graduated from [employer]  Marital history:  - He had 2 sons with his ex-wife  He is now involved in a new relationship    Living arrangement, social support: significant other  He is still in contact with his younger son (24years old) who he sees weekly    Occupational History: Employed as a  - works 5 days per week (2 days, 2 nights, and 1 midday shift)  Access to firearms: Denies direct access to weapons/firearms  [place] [employer] no history of arrests or violence with a deadly weapon          Traumatic History: (unchanged information from previous note copied and italicized) - Information that is bolded has been updated    Reyna Wang is reported  Other Traumatic Events: Tragically lost his son to a MVA and his mother to cancer in 2018      Past Medical History:    Past Medical History:   Diagnosis Date    Depression     Sleep apnea      No past medical history pertinent negatives    Past Surgical History:   Procedure Laterality Date    TESTICLE SURGERY       No Known Allergies    Substance Abuse History:    Social History     Substance and Sexual Activity   Alcohol Use Yes     Social History     Substance and Sexual Activity   Drug Use No       Social History:    Social History     Socioeconomic History    Marital status: Single     Spouse name: Not on file    Number of children: Not on file    Years of education: Not on file    Highest education level: Not on file   Occupational History    Not on file   Tobacco Use    Smoking status: Current Every Day Smoker     Packs/day: 1 50    Smokeless tobacco: Current User   Vaping Use    Vaping Use: Never used   Substance and Sexual Activity    Alcohol use: Yes    Drug use: No    Sexual activity: Yes     Partners: Female   Other Topics Concern    Not on file   Social History Narrative    Not on file     Social Determinants of Health     Financial Resource Strain:     Difficulty of Paying Living Expenses:    Food Insecurity:     Worried About Running Out of Food in the Last Year:     [address] in the Last Year:    Transportation Needs:     Lack of Transportation (Medical):  Lack of Transportation (Non-Medical):    Physical Activity:     Days of Exercise per Week:     Minutes of Exercise per Session:    Stress:     Feeling of Stress :    Social Connections:     Frequency of Communication with Friends and Family:     Frequency of Social Gatherings with Friends and Family:     Attends Congregation Services:     Active Member of Clubs or Organizations:     Attends Club or Organization Meetings:     Marital Status:    Intimate Partner Violence:     Fear of Current or Ex-Partner:     Emotionally Abused:     Physically Abused:     Sexually Abused:        Family Psychiatric History:     Family History   Problem Relation Age of Onset    Anxiety disorder Father         Previously on Valium    Depression Father        History Review: The following portions of the patient's history were reviewed and updated as appropriate: allergies, current medications, past family history, past medical history, past social history, past surgical history and problem list          OBJECTIVE:     Vital signs in last 24 hours: There were no vitals filed for this visit      Mental Status Evaluation:    Appearance age appropriate, casually dressed, dressed appropriately, looks stated age   Behavior pleasant, cooperative, calm, good eye contact   Speech normal rate, normal volume, normal pitch, fluent, clear   Mood normal, euthymic   Affect normal range and intensity, appropriate   Thought Processes organized, coherent, goal directed, normal rate of thoughts, normal abstract reasoning   Associations intact associations   Thought Content no overt delusions   Perceptual Disturbances: no auditory hallucinations, no visual hallucinations   Abnormal Thoughts  Risk Potential Suicidal ideation - None  Homicidal ideation - None  Potential for aggression - No   Orientation oriented to person, place, time/date and situation   Memory recent and remote memory grossly intact   Consciousness alert and awake   Attention Span Concentration Span attention span and concentration are age appropriate   Intellect appears to be of average intelligence   Insight intact and good   Judgement intact and good   Muscle Strength and  Gait normal gait and normal balance   Motor activity no abnormal movements   Language no difficulty naming common objects, no difficulty repeating a phrase   Fund of Knowledge adequate knowledge of current events  adequate fund of knowledge regarding past history  adequate fund of knowledge regarding vocabulary    Pain none   Pain Scale 0       Laboratory Results: I have personally reviewed all pertinent laboratory/tests results    Recent Labs (last 2 months):   No visits with results within 2 Month(s) from this visit     Latest known visit with results is:   Orders Only on 03/04/2020   Component Date Value    Sodium 09/04/2020 142     Potassium 09/04/2020 4 0     Chloride 09/04/2020 111*    CO2 09/04/2020 26     ANION GAP 09/04/2020 5     BUN 09/04/2020 15     Creatinine 09/04/2020 0 95     Glucose, Fasting 09/04/2020 102*    Calcium 09/04/2020 9 2     AST 09/04/2020 11     ALT 09/04/2020 22     Alkaline Phosphatase 09/04/2020 68     Total Protein 09/04/2020 7 0     Albumin 09/04/2020 3 7     Total Bilirubin 09/04/2020 0 32     eGFR 09/04/2020 89     Cholesterol 09/04/2020 198     Triglycerides 09/04/2020 101     HDL, Direct 09/04/2020 43     LDL Calculated 09/04/2020 135*    Non-HDL-Chol (CHOL-HDL) 09/04/2020 155        Suicide/Homicide Risk Assessment:    Risk of Harm to Self:  The following ratings are based on assessment at the time of the interview, observation over the last 3 months and review of records  Demographic risk factors include: ,  status, male, age: over 48 or older  Historical Risk Factors include: history of depression, history of traumatic experiences  Recent Specific Risk Factors include: none  Protective Factors: no current suicidal ideation, ability to adapt to change, able to manage anger well, access to mental health treatment, being a parent, compliant with medications, compliant with mental health treatment, connection to own children, effective coping skills, effective decision-making skills, effective problem solving skills, good health, good self-esteem, having a desire to be alive, having a sense of purpose or meaning in life, medical compliance, no substance use problems, opportunities to contribute to community, opportunities to participate in community, personal beliefs about the meaning and value of life, resiliency, responsibilities and duties to others, restricted access to lethal means, stable living environment, stable job, sense of determination, sense of personal control  Weapons: none  The following steps have been taken to ensure weapons are properly secured: not applicable  Based on today's assessment, Mansi White presents the following risk of harm to self: low    Risk of Harm to Others: The following ratings are based on assessment at the time of the interview, observation over the last 3 months and review of records  Demographic Risk Factors include: male  Historical Risk Factors include: none  Recent Specific Risk Factors include: none  Protective Factors: no current homicidal ideation  Weapons: none   The following steps have been taken to ensure weapons are properly secured: not applicable  Based on today's assessment, Ariana Castro presents the following risk of harm to others: none    The following interventions are recommended: no intervention changes needed      Lethality Statement:    Unchanged from previous assessment      Assessment/Plan:     Eligio Host a 61 y  o  male with past psychiatric history significant for major depressive disorder, complicated bereheavement, and nicotine use disorder who was personally seen and evaluated today at the [address] [place] [address] E outpatient clinic for follow-up and medication management  [place] endorses a neurotypical history without mood symptomatology or anxiety prior to 2 years ago  He denies prior psychiatric treatment or counseling before 2018 and no previous medication trials  Approximately two years ago, [place]'s oldest son was killed in a MVA after hydroplaning during a wet evening  Approximately 3-6 months after his death, [place]'s mother succumbed to a terminal illness (cancer)  These two significant losses were the catalyst for new-onset depressive symptomatology  Over the past 1 5-2 years, Ariana Castro admits to episodic sadness and intermittent feelings of loss  These symptoms intensified since March/April 2020, likely secondary to COVID-19, mandatory quearentine, and subsequent disconnect from others  In June 2020, during an office visit with his PCP, Ariana Castro endorsed worsening depression and anxiety and was started on Sertraline which was titrated to 100mg Daily  He endorsed improvement in mood and resolution in daily anxiety, however, he was experiencing sexual dysfunction (erectile dysfunction) which was problematic  As such, he was tapered of Zoloft and Wellbutrin was started which was over-stimulating and induced anxiety  This agent was subsequently discontinued and Ariana Castro was started on [employer], [employer] did not cover   Today, he remains on Prozac and is tolerating it well      Psychopharmacologically, [place] endorses ongoing mood stability and improvement in anxiety and mood reactivity with Prozac 40mg Daily  As such, no acute interventions are warranted          DSM-V Diagnoses:      1 ) Major Depressive Episode, recurrent, with anxious distress  2 ) Nicotine Use Disorder  3 ) Erectile Dysfunction (drug-induced)        Treatment Recommendations/Precautions:     1 ) Major Depressive Episode, recurrent, with anxious distress  - Hx of medication trials including: Sertraline (tolerated well but experience sexual dysfunction), Wellbutrin (too activating)  - Continue Prozac 40mg Daily  - Discussed referral for psychotherapy - reluctant at this juncture  - Encouraged to pursue couples therapy with his girlfriend   - Given information to attend "Coping with loss" group at [facility]   - Psychoeducation provided regarding the importance of exercise and healthy dietary choices and their impact on mood, energy, and motivation  - Counseled to avoid ETOH, illict substances, and nicotine secondary to the detrimental effects of these substances on mental and physical health     2  ) Nicotine Use Disorder  - States that he previously smoked 2 packs per day - he is currently smoking 10 cigarettes per day  - Continue Chantix Rx by PCP  - Completed hypnosis   - Psychoeducation provided regarding the importance of exercise and healthy dietary choices and their impact on mood, energy, and motivation  - Counseled to avoid ETOH, illict substances, and nicotine secondary to the detrimental effects of these substances on mental and physical health     3  ) Erectile Dysfunction (drug-induced)  - Hx of medication trials including: Sertraline (tolerated well but experience sexual dysfunction)  - Sertraline discontinued, Trintellix was started but he could not afford co-pay  - PRN Viagra Rx by PCP   - Psychoeducation provided regarding the importance of exercise and healthy dietary choices and their impact on mood, energy, and motivation  - Counseled to avoid ETOH, illict substances, and nicotine secondary to the detrimental effects of these substances on mental and physical health          Medication management every 3 months  Aware of need to follow up with family physician for medical issues  Aware of 24 hour and weekend coverage for urgent situations accessed by calling [facility] main practice number    Medications Risks/Benefits      Risks, Benefits And Possible Side Effects Of Medications:    Risks, benefits, and possible side effects of medications explained to Bo Pham including risk of suicidality and serotonin syndrome related to treatment with antidepressants  He verbalizes understanding and agreement for treatment  Controlled Medication Discussion:     No recent records found for controlled prescriptions according to [place] Prescription Drug Monitoring Program    Psychotherapy Provided:     Individual psychotherapy provided: Yes  Counseling was provided during the session today for 16 minutes  Medication education provided to Bo Pham  Recent stressor including family conflict, family issues, relationship problems, job stress, health issues, recent medication change, everyday stressors and occasional anxiety discussed with [place]  Coping strategies including abstaining from substance use, compliance with medications, getting into a good routine, increasing social contact, maintain healthy diet, maintain heathy sleeping hygiene, maintain positive attitude and stress reduction reviewed with [place]  Educated on importance of medication and treatment compliance  Supportive therapy provided  Treatment Plan:    Completed and signed during the session: Not applicable - Treatment Plan not due at this session    Note Share Disclaimer:      This note was not shared with the patient due to reasonable likelihood of causing patient harm    Alexandria Abbasi MD 06/18/21

## 2021-06-21 NOTE — TELEPHONE ENCOUNTER
Spoke with patient, advised above    Scheduled DME set up 7/12/2021 [facility] representative aware    Compliance follow up scheduled 9/10/2021 with Dr Cris Gonzalez

## 2021-07-12 ENCOUNTER — TELEPHONE (OUTPATIENT)
Dept: SLEEP CENTER | Facility: CLINIC | Age: 56
End: 2021-07-12

## 2021-08-16 ENCOUNTER — TELEPHONE (OUTPATIENT)
Dept: SLEEP CENTER | Facility: CLINIC | Age: 56
End: 2021-08-16

## 2021-08-16 NOTE — TELEPHONE ENCOUNTER
Returned the patient's call   He left message saying he is still snoring on the BIPAP   Left call back message for the patient       Requested compliance for Dr Melgoza Kin   Please review and advise

## 2021-08-17 ENCOUNTER — TELEPHONE (OUTPATIENT)
Dept: SLEEP CENTER | Facility: CLINIC | Age: 56
End: 2021-08-17

## 2021-08-17 NOTE — TELEPHONE ENCOUNTER
Left message for patient  Advised pressure change 23/19cm ordered  Advised order will be sent to [facility]  It could take up to 24 hours for change to occur

## 2021-08-24 NOTE — TELEPHONE ENCOUNTER
Patient's pressure was  change the date it was dated in Epic  Just wasn't a huge change  Called patient and left a message

## 2021-08-24 NOTE — TELEPHONE ENCOUNTER
Patient left voice message stating he is still snoring with BiPAP  An adjustment was supposed to have been made recently but he is not sure if it was  Jeanette Camacho, can you please check if the change was completed and contact the patient to let him know? Thanks

## 2021-09-10 ENCOUNTER — OFFICE VISIT (OUTPATIENT)
Dept: SLEEP CENTER | Facility: CLINIC | Age: 56
End: 2021-09-10
Payer: COMMERCIAL

## 2021-09-10 VITALS
BODY MASS INDEX: 27.58 KG/M2 | WEIGHT: 182 LBS | SYSTOLIC BLOOD PRESSURE: 120 MMHG | DIASTOLIC BLOOD PRESSURE: 76 MMHG | HEART RATE: 69 BPM | HEIGHT: 68 IN

## 2021-09-10 DIAGNOSIS — G47.33 OSA (OBSTRUCTIVE SLEEP APNEA): Primary | ICD-10-CM

## 2021-09-10 PROCEDURE — 99214 OFFICE O/P EST MOD 30 MIN: CPT | Performed by: INTERNAL MEDICINE

## 2021-09-10 PROCEDURE — 4004F PT TOBACCO SCREEN RCVD TLK: CPT | Performed by: INTERNAL MEDICINE

## 2021-09-10 PROCEDURE — 3008F BODY MASS INDEX DOCD: CPT | Performed by: INTERNAL MEDICINE

## 2021-09-10 NOTE — PROGRESS NOTES
Progress Note - Sleep Center   Candance Ruddle :1965 MRN: [id number]      Reason for Visit:  61 y  o male here for PAP compliance check    Assessment:  Having difficulty with new PAP device  Sleep quality is unimproved  Compliance data show utilization for less than 70% of nights, for greater than or equal to 4 hours per night  The patient has had difficulty with BPAP due to high pressure  His wife reports snoring, which was also seen at highest pressure on his titration study  A pressure as low as 16/12 cm provided reasonable treatment and I will change him to BPAP Auto with a range of minimum EPAP 12 cm and maximum IPAP 22 cm  Unfortunately, he continues to snore which is disturbing his wife and thereby causing problems  Perhaps uvulopalatopharyngoplasty would both eliminate his snoring and reduce his positive airway pressure requirement  I have given the patient Dr Shira Crews phone number and have referred him for evaluation by ENT  Plan:  Improve compliance  The patient will contact me in one week if the BPAP Auto does not improve his  tolerance    Follow up:   three months    History of Present Illness:  History of CECILIA on PAP therapy  Difficulty with compliance        Review of Systems      Genitourinary none   Cardiology none   Gastrointestinal none   Neurology none   Constitutional none   Integumentary none   Psychiatry aggressiveness or irritability   Musculoskeletal none   Pulmonary snoring   ENT none   Endocrine none   Hematological none           I have reviewed and updated the review of systems as necessary    Historical Information    Past Medical History:   Past Medical History:   Diagnosis Date    Depression     Sleep apnea          Past Surgical History:   Past Surgical History:   Procedure Laterality Date    TESTICLE SURGERY           Social History:   Social History     Socioeconomic History    Marital status: Single     Spouse name: None    Number of children: None    Years of education: None    Highest education level: None   Occupational History    None   Tobacco Use    Smoking status: Current Every Day Smoker     Packs/day: 1 50    Smokeless tobacco: Current User   Vaping Use    Vaping Use: Never used   Substance and Sexual Activity    Alcohol use: Yes    Drug use: No    Sexual activity: Yes     Partners: Female   Other Topics Concern    None   Social History Narrative    None     Social Determinants of Health     Financial Resource Strain:     Difficulty of Paying Living Expenses:    Food Insecurity:     Worried About Running Out of Food in the Last Year:     [address] in the Last Year:    Transportation Needs:     Lack of Transportation (Medical):      Lack of Transportation (Non-Medical):    Physical Activity:     Days of Exercise per Week:     Minutes of Exercise per Session:    Stress:     Feeling of Stress :    Social Connections:     Frequency of Communication with Friends and Family:     Frequency of Social Gatherings with Friends and Family:     Attends Zoro[employer] Services:     Active Member of Clubs or Organizations:     Attends Club or Organization Meetings:     Marital Status:    Intimate Partner Violence:     Fear of Current or Ex-Partner:     Emotionally Abused:     Physically Abused:     Sexually Abused:          Family History:   Family History   Problem Relation Age of Onset    Anxiety disorder Father         Previously on Valium    Depression Father        Medications/Allergies:      Current Outpatient Medications:     FLUoxetine (PROzac) 40 MG capsule, Take 1 capsule (40 mg total) by mouth daily, Disp: 90 capsule, Rfl: 0    sildenafil (VIAGRA) 100 mg tablet, Take 1 tablet (100 mg total) by mouth daily as needed for erectile dysfunction, Disp: 10 tablet, Rfl: 2    varenicline (CHANTIX) 1 mg tablet, Take 1 tablet (1 mg total) by mouth 2 (two) times a day (Patient not taking: Reported on 3/10/2021), Disp: 120 tablet, Rfl: 0      Objective    Vital Signs:   Vitals:    09/10/21 0800   BP: 120/76   Pulse: 69     [place] Sleepiness Scale: Total score: 8        Physical Exam:    General: Alert, appropriate, cooperative, overweight    Head: NC/AT    Skin: Warm, dry    Neuro: No motor abnormalities, cranial nerves appear intact    Extremity: No clubbing, cyanosis    PAP setting:    BPAP Auto minimum EPAP 12 cm, maximum IPAP 22 cm and pressure support 4  DME Provider: [employer]  Test results:    AHI = 60 0    Counseling / Coordination of Care  Total clinic time spent today  15 minutes  A description of the counseling / coordination of care: Discussed means to improve compliance  JACKSON Jackson    Board Certified Sleep Specialist

## 2021-09-13 ENCOUNTER — TELEPHONE (OUTPATIENT)
Dept: SLEEP CENTER | Facility: CLINIC | Age: 56
End: 2021-09-13

## 2021-09-14 ENCOUNTER — APPOINTMENT (OUTPATIENT)
Dept: LAB | Facility: IMAGING CENTER | Age: 56
End: 2021-09-14
Payer: COMMERCIAL

## 2021-09-14 LAB
ALBUMIN SERPL BCP-MCNC: 3.5 G/DL (ref 3.5–5)
ALP SERPL-CCNC: 68 U/L (ref 46–116)
ALT SERPL W P-5'-P-CCNC: 22 U/L (ref 12–78)
ANION GAP SERPL CALCULATED.3IONS-SCNC: 2 MMOL/L (ref 4–13)
AST SERPL W P-5'-P-CCNC: 11 U/L (ref 5–45)
BILIRUB SERPL-MCNC: 0.25 MG/DL (ref 0.2–1)
BUN SERPL-MCNC: 18 MG/DL (ref 5–25)
CALCIUM SERPL-MCNC: 8.8 MG/DL (ref 8.3–10.1)
CHLORIDE SERPL-SCNC: 110 MMOL/L (ref 100–108)
CHOLEST SERPL-MCNC: 202 MG/DL (ref 50–200)
CO2 SERPL-SCNC: 27 MMOL/L (ref 21–32)
CREAT SERPL-MCNC: 0.82 MG/DL (ref 0.6–1.3)
GFR SERPL CREATININE-BSD FRML MDRD: 98 ML/MIN/1.73SQ M
GLUCOSE P FAST SERPL-MCNC: 91 MG/DL (ref 65–99)
HDLC SERPL-MCNC: 40 MG/DL
LDLC SERPL CALC-MCNC: 143 MG/DL (ref 0–100)
NONHDLC SERPL-MCNC: 162 MG/DL
POTASSIUM SERPL-SCNC: 4.7 MMOL/L (ref 3.5–5.3)
PROT SERPL-MCNC: 6.8 G/DL (ref 6.4–8.2)
SODIUM SERPL-SCNC: 139 MMOL/L (ref 136–145)
TRIGL SERPL-MCNC: 93 MG/DL

## 2021-09-14 PROCEDURE — 80061 LIPID PANEL: CPT | Performed by: PHYSICIAN ASSISTANT

## 2021-09-14 PROCEDURE — 36415 COLL VENOUS BLD VENIPUNCTURE: CPT | Performed by: PHYSICIAN ASSISTANT

## 2021-09-14 PROCEDURE — 80053 COMPREHEN METABOLIC PANEL: CPT | Performed by: PHYSICIAN ASSISTANT

## 2021-09-22 ENCOUNTER — OFFICE VISIT (OUTPATIENT)
Dept: PSYCHIATRY | Facility: CLINIC | Age: 56
End: 2021-09-22
Payer: COMMERCIAL

## 2021-09-22 DIAGNOSIS — F33.0 MILD EPISODE OF RECURRENT MAJOR DEPRESSIVE DISORDER (HCC): Primary | ICD-10-CM

## 2021-09-22 DIAGNOSIS — N52.2 DRUG-INDUCED ERECTILE DYSFUNCTION: ICD-10-CM

## 2021-09-22 PROCEDURE — 90833 PSYTX W PT W E/M 30 MIN: CPT | Performed by: STUDENT IN AN ORGANIZED HEALTH CARE EDUCATION/TRAINING PROGRAM

## 2021-09-22 PROCEDURE — 99213 OFFICE O/P EST LOW 20 MIN: CPT | Performed by: STUDENT IN AN ORGANIZED HEALTH CARE EDUCATION/TRAINING PROGRAM

## 2021-09-22 RX ORDER — FLUOXETINE HYDROCHLORIDE 40 MG/1
40 CAPSULE ORAL DAILY
Qty: 90 CAPSULE | Refills: 0 | Status: SHIPPED | OUTPATIENT
Start: 2021-09-22 | End: 2022-01-14 | Stop reason: SDUPTHER

## 2021-09-22 NOTE — BH TREATMENT PLAN
TREATMENT PLAN (Medication Management Only)        [facility]    Name and Date of Birth:  Kristin Reilly 61 y o  1965  Date of Treatment Plan: September 22, 2021  Diagnosis/Diagnoses:    1  Mild episode of recurrent major depressive disorder ([address] )    2  Drug-induced erectile dysfunction      Strengths/Personal Resources for Self-Care: supportive family, supportive friends, taking medications as prescribed, ability to adapt to life changes, ability to communicate needs, ability to communicate well, ability to listen, average or above intelligence, good physical health, good understanding of illness, independence, motivation for treatment, ability to negotiate basic needs, being resoureceful, self-reliance, sense of humor, special hobby/interest, stable employment, willingness to work on problems  Area/Areas of need (in own words): depressive symptoms, anger control  1  Long Term Goal: maintain control of depression  Target Date:6 months - 3/22/2022  Person/Persons responsible for completion of goal: [place] May  Short Term Objective (s) - How will we reach this goal?:   A  Provider new recommended medication/dosage changes and/or continue medication(s): continue current medications as prescribed  B  Attend medication management appointments regularly  C  Take psychiatric medications responsibly  D  Spend more time with son, increase frequency of breakfast dates  E  Avoid confrontation with GF  F  Completely stop smoking by December 2021   Target Date:6 months - 3/22/2022  Person/Persons Responsible for Completion of Goal: [place]  Progress Towards Goals: stable, continuing treatment  Treatment Modality: medication management every 3 months  Review due 180 days from date of this plan: 6 months - 3/22/2022  Expected length of service: ongoing treatment  My Physician/PA/NP and I have developed this plan together and I agree to work on the goals and objectives  I understand the treatment goals that were developed for my treatment  Treatment Plan completed with assistance and input from patient and verbal consent provided  Treatment plan was not signed at time of office visit secondary to COVID-19 social distancing guidelines
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
## 2021-11-05 PROBLEM — J31.0 GUSTATORY RHINITIS: Status: ACTIVE | Noted: 2021-11-05

## 2021-11-05 PROBLEM — G47.33 OBSTRUCTIVE SLEEP APNEA: Status: ACTIVE | Noted: 2019-08-12

## 2021-12-01 ENCOUNTER — VBI (OUTPATIENT)
Dept: ADMINISTRATIVE | Facility: OTHER | Age: 56
End: 2021-12-01

## 2021-12-30 ENCOUNTER — VBI (OUTPATIENT)
Dept: ADMINISTRATIVE | Facility: OTHER | Age: 56
End: 2021-12-30

## 2022-01-10 ENCOUNTER — OFFICE VISIT (OUTPATIENT)
Dept: FAMILY MEDICINE CLINIC | Facility: CLINIC | Age: 57
End: 2022-01-10
Payer: COMMERCIAL

## 2022-01-10 VITALS
HEART RATE: 79 BPM | DIASTOLIC BLOOD PRESSURE: 80 MMHG | OXYGEN SATURATION: 98 % | BODY MASS INDEX: 28.34 KG/M2 | TEMPERATURE: 97.9 F | RESPIRATION RATE: 16 BRPM | SYSTOLIC BLOOD PRESSURE: 116 MMHG | WEIGHT: 187 LBS | HEIGHT: 68 IN

## 2022-01-10 DIAGNOSIS — E78.2 MIXED HYPERLIPIDEMIA: ICD-10-CM

## 2022-01-10 DIAGNOSIS — F33.0 MILD EPISODE OF RECURRENT MAJOR DEPRESSIVE DISORDER (HCC): ICD-10-CM

## 2022-01-10 DIAGNOSIS — Z12.11 COLON CANCER SCREENING: ICD-10-CM

## 2022-01-10 DIAGNOSIS — Z72.0 TOBACCO ABUSE: ICD-10-CM

## 2022-01-10 DIAGNOSIS — Z12.5 PROSTATE CANCER SCREENING: ICD-10-CM

## 2022-01-10 DIAGNOSIS — Z00.00 HEALTHCARE MAINTENANCE: Primary | ICD-10-CM

## 2022-01-10 PROCEDURE — 3008F BODY MASS INDEX DOCD: CPT | Performed by: FAMILY MEDICINE

## 2022-01-10 PROCEDURE — 99396 PREV VISIT EST AGE 40-64: CPT | Performed by: FAMILY MEDICINE

## 2022-01-10 PROCEDURE — 4004F PT TOBACCO SCREEN RCVD TLK: CPT | Performed by: FAMILY MEDICINE

## 2022-01-10 RX ORDER — VARENICLINE TARTRATE 25 MG
KIT ORAL
Qty: 53 TABLET | Refills: 0 | Status: SHIPPED | OUTPATIENT
Start: 2022-01-10 | End: 2022-01-20

## 2022-01-10 NOTE — PROGRESS NOTES
Assessment/Plan:  Tobacco abuse  10 minute spent discussed about smoking cessation and benefit of quitting smoking and the risk of continues to smoke  Discussed about medications and possible side effects  He was given prescription for Chantix  Discussed about possible side effects  Come back in 6 weeks for follow-up  Mixed hyperlipidemia  Not well controlled  Discussed about low-fat diet and regular exercise  Discussed about starting on statin  I will repeat blood work and consider starting him on medication next visit  Mild episode of recurrent major depressive disorder (HCC)  Stable  Continue same  Continue follow-up with psych  Healthcare maintenance  Discussed about immunizations, diet, exercise and safety measures  Diagnoses and all orders for this visit:    Healthcare maintenance  -     Comprehensive metabolic panel; Future  -     CBC and differential; Future    Colon cancer screening  -     Occult Blood, Fecal Immunochemical; Future    Mixed hyperlipidemia  -     Lipid Panel with Direct LDL reflex; Future  -     TSH, 3rd generation with Free T4 reflex; Future  -     CBC and differential; Future    Tobacco abuse  -     varenicline (CHANTIX TIERRA) 0 5 MG X 11 & 1 MG X 42 tablet; Take one 0 5 mg tablet by mouth once daily for 3 days, then one 0 5 mg tablet by mouth twice daily for 4 days, then one 1 mg tablet by mouth twice daily  Prostate cancer screening  -     PSA, Total Screen; Future    Mild episode of recurrent major depressive disorder (HCC)    BMI 28 0-28 9,adult          There are no Patient Instructions on file for this visit  Return in about 6 weeks (around 2/21/2022)  Subjective:      Patient ID: Arnulfo Wilson is a 61 y o  male  Chief Complaint   Patient presents with    Nicotine Dependence       Is here today for wellness exam and requesting start on medication to help him to quit smoking  He tried nicotine patches but did not help    He is on the Prozac for anxiety and depression and he follow-up with psychiatrist   He stated his symptoms are well controlled  He has history of hyperlipidemia but he is on medications  The following portions of the patient's history were reviewed and updated as appropriate: allergies, current medications, past family history, past medical history, past social history, past surgical history and problem list     Review of Systems   Constitutional: Negative for chills and fever  HENT: Negative for trouble swallowing  Eyes: Negative for visual disturbance  Respiratory: Negative for cough and shortness of breath  Cardiovascular: Negative for chest pain and palpitations  Gastrointestinal: Negative for abdominal pain, blood in stool and vomiting  Endocrine: Negative for cold intolerance and heat intolerance  Genitourinary: Negative for difficulty urinating and dysuria  Musculoskeletal: Negative for gait problem  Skin: Negative for rash  Neurological: Negative for dizziness, syncope and headaches  Hematological: Negative for adenopathy  Psychiatric/Behavioral: Negative for behavioral problems  Current Outpatient Medications   Medication Sig Dispense Refill    FLUoxetine (PROzac) 40 MG capsule Take 1 capsule (40 mg total) by mouth daily 90 capsule 0    sildenafil (VIAGRA) 100 mg tablet Take 1 tablet (100 mg total) by mouth daily as needed for erectile dysfunction 10 tablet 2    varenicline (CHANTIX TIERRA) 0 5 MG X 11 & 1 MG X 42 tablet Take one 0 5 mg tablet by mouth once daily for 3 days, then one 0 5 mg tablet by mouth twice daily for 4 days, then one 1 mg tablet by mouth twice daily  53 tablet 0     No current facility-administered medications for this visit         Objective:    /80 (BP Location: Left arm, Patient Position: Sitting, Cuff Size: Large)   Pulse 79   Temp 97 9 °F (36 6 °C) (Tympanic)   Resp 16   Ht 5' 8" (1 727 m)   Wt 84 8 kg (187 lb)   SpO2 98%   BMI 28 43 kg/m² Physical Exam  Vitals and nursing note reviewed  Constitutional:       Appearance: He is well-developed  HENT:      Head: Normocephalic and atraumatic  Eyes:      Pupils: Pupils are equal, round, and reactive to light  Cardiovascular:      Rate and Rhythm: Normal rate and regular rhythm  Heart sounds: Normal heart sounds  Pulmonary:      Effort: Pulmonary effort is normal       Breath sounds: Normal breath sounds  Abdominal:      General: Bowel sounds are normal       Palpations: Abdomen is soft  Musculoskeletal:         General: Normal range of motion  Cervical back: Normal range of motion and neck supple  Lymphadenopathy:      Cervical: No cervical adenopathy  Skin:     General: Skin is warm  Findings: No rash  Neurological:      Mental Status: He is alert and oriented to person, place, and time  Cranial Nerves: No cranial nerve deficit  Abdifatah Palumbo MD BMI Counseling: Body mass index is 28 43 kg/m²  The BMI is above normal  Nutrition recommendations include reducing portion sizes, decreasing overall calorie intake and 3-5 servings of fruits/vegetables daily  Exercise recommendations include moderate aerobic physical activity for 150 minutes/week

## 2022-01-10 NOTE — ASSESSMENT & PLAN NOTE
Not well controlled  Discussed about low-fat diet and regular exercise  Discussed about starting on statin  I will repeat blood work and consider starting him on medication next visit

## 2022-01-10 NOTE — ASSESSMENT & PLAN NOTE
10 minute spent discussed about smoking cessation and benefit of quitting smoking and the risk of continues to smoke  Discussed about medications and possible side effects  He was given prescription for Chantix  Discussed about possible side effects  Come back in 6 weeks for follow-up

## 2022-01-12 ENCOUNTER — TELEPHONE (OUTPATIENT)
Dept: FAMILY MEDICINE CLINIC | Facility: CLINIC | Age: 57
End: 2022-01-12

## 2022-01-12 NOTE — TELEPHONE ENCOUNTER
Message from [address] who said they received script for chantix    chantix has been taken off the market but there is a generic that is available from [facility],  Asking for a call to explain name, strength and directionis    Call [phone]

## 2022-01-14 ENCOUNTER — OFFICE VISIT (OUTPATIENT)
Dept: PSYCHIATRY | Facility: CLINIC | Age: 57
End: 2022-01-14
Payer: COMMERCIAL

## 2022-01-14 DIAGNOSIS — F17.200 NICOTINE USE DISORDER: ICD-10-CM

## 2022-01-14 DIAGNOSIS — F33.0 MILD EPISODE OF RECURRENT MAJOR DEPRESSIVE DISORDER (HCC): Primary | ICD-10-CM

## 2022-01-14 DIAGNOSIS — N52.2 DRUG-INDUCED ERECTILE DYSFUNCTION: ICD-10-CM

## 2022-01-14 PROCEDURE — 90833 PSYTX W PT W E/M 30 MIN: CPT | Performed by: STUDENT IN AN ORGANIZED HEALTH CARE EDUCATION/TRAINING PROGRAM

## 2022-01-14 PROCEDURE — 99214 OFFICE O/P EST MOD 30 MIN: CPT | Performed by: STUDENT IN AN ORGANIZED HEALTH CARE EDUCATION/TRAINING PROGRAM

## 2022-01-14 RX ORDER — FLUOXETINE HYDROCHLORIDE 40 MG/1
40 CAPSULE ORAL DAILY
Qty: 90 CAPSULE | Refills: 2 | Status: SHIPPED | OUTPATIENT
Start: 2022-01-14 | End: 2022-03-08 | Stop reason: SDUPTHER
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
## 2022-01-18 NOTE — TELEPHONE ENCOUNTER
Phone call from pt, states he is willing to try a generic med in place of Chantix  Please advise pt

## 2022-01-19 ENCOUNTER — APPOINTMENT (OUTPATIENT)
Dept: LAB | Facility: HOSPITAL | Age: 57
End: 2022-01-19
Attending: FAMILY MEDICINE
Payer: COMMERCIAL

## 2022-01-19 ENCOUNTER — OFFICE VISIT (OUTPATIENT)
Dept: LAB | Facility: HOSPITAL | Age: 57
End: 2022-01-19
Attending: OTOLARYNGOLOGY
Payer: COMMERCIAL

## 2022-01-19 DIAGNOSIS — G47.33 OBSTRUCTIVE SLEEP APNEA (ADULT) (PEDIATRIC): ICD-10-CM

## 2022-01-19 DIAGNOSIS — Z12.5 PROSTATE CANCER SCREENING: ICD-10-CM

## 2022-01-19 DIAGNOSIS — E78.2 MIXED HYPERLIPIDEMIA: ICD-10-CM

## 2022-01-19 DIAGNOSIS — Z00.00 HEALTHCARE MAINTENANCE: ICD-10-CM

## 2022-01-19 LAB
ALBUMIN SERPL BCP-MCNC: 3.6 G/DL (ref 3.5–5)
ALP SERPL-CCNC: 75 U/L (ref 46–116)
ALT SERPL W P-5'-P-CCNC: 21 U/L (ref 12–78)
ANION GAP SERPL CALCULATED.3IONS-SCNC: 4 MMOL/L (ref 4–13)
AST SERPL W P-5'-P-CCNC: 12 U/L (ref 5–45)
ATRIAL RATE: 69 BPM
BASOPHILS # BLD AUTO: 0.05 THOUSANDS/ΜL (ref 0–0.1)
BASOPHILS NFR BLD AUTO: 1 % (ref 0–1)
BILIRUB SERPL-MCNC: 1.22 MG/DL (ref 0.2–1)
BUN SERPL-MCNC: 18 MG/DL (ref 5–25)
CALCIUM SERPL-MCNC: 9 MG/DL (ref 8.3–10.1)
CHLORIDE SERPL-SCNC: 108 MMOL/L (ref 100–108)
CHOLEST SERPL-MCNC: 213 MG/DL
CO2 SERPL-SCNC: 26 MMOL/L (ref 21–32)
CREAT SERPL-MCNC: 1 MG/DL (ref 0.6–1.3)
EOSINOPHIL # BLD AUTO: 0 THOUSAND/ΜL (ref 0–0.61)
EOSINOPHIL NFR BLD AUTO: 0 % (ref 0–6)
ERYTHROCYTE [DISTWIDTH] IN BLOOD BY AUTOMATED COUNT: 12.9 % (ref 11.6–15.1)
GFR SERPL CREATININE-BSD FRML MDRD: 83 ML/MIN/1.73SQ M
GLUCOSE P FAST SERPL-MCNC: 132 MG/DL (ref 65–99)
HCT VFR BLD AUTO: 48.9 % (ref 36.5–49.3)
HDLC SERPL-MCNC: 32 MG/DL
HGB BLD-MCNC: 15.8 G/DL (ref 12–17)
IMM GRANULOCYTES # BLD AUTO: 0.05 THOUSAND/UL (ref 0–0.2)
IMM GRANULOCYTES NFR BLD AUTO: 1 % (ref 0–2)
LDLC SERPL CALC-MCNC: 149 MG/DL (ref 0–100)
LYMPHOCYTES # BLD AUTO: 2.53 THOUSANDS/ΜL (ref 0.6–4.47)
LYMPHOCYTES NFR BLD AUTO: 27 % (ref 14–44)
MCH RBC QN AUTO: 30.7 PG (ref 26.8–34.3)
MCHC RBC AUTO-ENTMCNC: 32.3 G/DL (ref 31.4–37.4)
MCV RBC AUTO: 95 FL (ref 82–98)
MONOCYTES # BLD AUTO: 0.72 THOUSAND/ΜL (ref 0.17–1.22)
MONOCYTES NFR BLD AUTO: 8 % (ref 4–12)
NEUTROPHILS # BLD AUTO: 5.97 THOUSANDS/ΜL (ref 1.85–7.62)
NEUTS SEG NFR BLD AUTO: 63 % (ref 43–75)
NRBC BLD AUTO-RTO: 0 /100 WBCS
P AXIS: 31 DEGREES
PLATELET # BLD AUTO: 365 THOUSANDS/UL (ref 149–390)
PMV BLD AUTO: 9.2 FL (ref 8.9–12.7)
POTASSIUM SERPL-SCNC: 4.4 MMOL/L (ref 3.5–5.3)
PR INTERVAL: 172 MS
PROT SERPL-MCNC: 7.2 G/DL (ref 6.4–8.2)
QRS AXIS: 61 DEGREES
QRSD INTERVAL: 94 MS
QT INTERVAL: 388 MS
QTC INTERVAL: 415 MS
RBC # BLD AUTO: 5.14 MILLION/UL (ref 3.88–5.62)
SODIUM SERPL-SCNC: 138 MMOL/L (ref 136–145)
T WAVE AXIS: 59 DEGREES
TRIGL SERPL-MCNC: 162 MG/DL
TSH SERPL DL<=0.05 MIU/L-ACNC: 1.04 UIU/ML (ref 0.36–3.74)
VENTRICULAR RATE: 69 BPM
WBC # BLD AUTO: 9.32 THOUSAND/UL (ref 4.31–10.16)

## 2022-01-19 PROCEDURE — 93010 ELECTROCARDIOGRAM REPORT: CPT | Performed by: INTERNAL MEDICINE

## 2022-01-19 PROCEDURE — 84443 ASSAY THYROID STIM HORMONE: CPT

## 2022-01-19 PROCEDURE — 93005 ELECTROCARDIOGRAM TRACING: CPT

## 2022-01-19 PROCEDURE — 80053 COMPREHEN METABOLIC PANEL: CPT

## 2022-01-19 PROCEDURE — 85025 COMPLETE CBC W/AUTO DIFF WBC: CPT

## 2022-01-19 PROCEDURE — G0103 PSA SCREENING: HCPCS

## 2022-01-19 PROCEDURE — 80061 LIPID PANEL: CPT

## 2022-01-19 PROCEDURE — 36415 COLL VENOUS BLD VENIPUNCTURE: CPT

## 2022-01-20 ENCOUNTER — TELEPHONE (OUTPATIENT)
Dept: FAMILY MEDICINE CLINIC | Facility: CLINIC | Age: 57
End: 2022-01-20

## 2022-01-20 DIAGNOSIS — Z72.0 TOBACCO ABUSE: Primary | ICD-10-CM

## 2022-01-20 LAB — PSA SERPL-MCNC: 2.2 NG/ML (ref 0–4)

## 2022-01-20 RX ORDER — VARENICLINE TARTRATE 0.5 MG/1
TABLET, FILM COATED ORAL
Qty: 11 TABLET | Refills: 0 | Status: SHIPPED | OUTPATIENT
Start: 2022-01-20 | End: 2022-04-05

## 2022-01-20 RX ORDER — VARENICLINE TARTRATE 1 MG/1
1 TABLET, FILM COATED ORAL 2 TIMES DAILY
Qty: 42 TABLET | Refills: 0 | Status: SHIPPED | OUTPATIENT
Start: 2022-01-20 | End: 2022-04-05

## 2022-01-20 NOTE — TELEPHONE ENCOUNTER
Per [facility], they have the [place] version of Chantix available but it does not come in starter pack  Pt will need separate scripts  Apo-varenicline   5mg and 1 mg tabs   [facility] has in stock  We will hold on Wellbutrin ,

## 2022-01-20 NOTE — TELEPHONE ENCOUNTER
Phone call from Madelyn at [facility], states they cannot get starter pack for Chantix  They would need 2 scripts for the generic  One should be  5mg with correct dispensing directions & the other should be 1mg with remaining dosing directions  Any questions, call Madelyn (Enma Barahona) at [phone]

## 2022-01-20 NOTE — TELEPHONE ENCOUNTER
There is a generic for Chantix but not available in [place]  Pt has tried patched and gum in past  Pt is willing to try Wellbutrin if that is an option  Pt is currently on Prozac  [facility]

## 2022-02-14 ENCOUNTER — TELEPHONE (OUTPATIENT)
Dept: FAMILY MEDICINE CLINIC | Facility: CLINIC | Age: 57
End: 2022-02-14

## 2022-02-14 NOTE — TELEPHONE ENCOUNTER
----- Message from Ghada Johnson MD sent at 2/13/2022  3:28 PM EST -----    Blood work came back showing high cholesterol  All the rest of blood work came back normal   I will discuss further in his next appointment

## 2022-03-08 ENCOUNTER — OFFICE VISIT (OUTPATIENT)
Dept: PSYCHIATRY | Facility: CLINIC | Age: 57
End: 2022-03-08
Payer: COMMERCIAL

## 2022-03-08 DIAGNOSIS — F33.0 MILD EPISODE OF RECURRENT MAJOR DEPRESSIVE DISORDER (HCC): ICD-10-CM

## 2022-03-08 DIAGNOSIS — F17.200 NICOTINE USE DISORDER: ICD-10-CM

## 2022-03-08 DIAGNOSIS — F41.9 ANXIETY: ICD-10-CM

## 2022-03-08 DIAGNOSIS — F33.1 MODERATE EPISODE OF RECURRENT MAJOR DEPRESSIVE DISORDER (HCC): Primary | ICD-10-CM

## 2022-03-08 PROCEDURE — 99214 OFFICE O/P EST MOD 30 MIN: CPT | Performed by: STUDENT IN AN ORGANIZED HEALTH CARE EDUCATION/TRAINING PROGRAM

## 2022-03-08 PROCEDURE — 90833 PSYTX W PT W E/M 30 MIN: CPT | Performed by: STUDENT IN AN ORGANIZED HEALTH CARE EDUCATION/TRAINING PROGRAM

## 2022-03-08 RX ORDER — FLUOXETINE HYDROCHLORIDE 40 MG/1
40 CAPSULE ORAL DAILY
Qty: 90 CAPSULE | Refills: 2 | Status: SHIPPED | OUTPATIENT
Start: 2022-03-08

## 2022-03-08 RX ORDER — FLUOXETINE HYDROCHLORIDE 20 MG/1
20 CAPSULE ORAL DAILY
Qty: 90 CAPSULE | Refills: 2 | Status: SHIPPED | OUTPATIENT
Start: 2022-03-08

## 2022-03-14 ENCOUNTER — ANESTHESIA EVENT (OUTPATIENT)
Dept: PERIOP | Facility: HOSPITAL | Age: 57
End: 2022-03-14
Payer: COMMERCIAL

## 2022-04-05 NOTE — PRE-PROCEDURE INSTRUCTIONS
Pre-Surgery Instructions:   Medication Instructions    FLUoxetine (PROzac) 20 mg capsule Instructed to take per normal schedule including DOS with sips water    FLUoxetine (PROzac) 40 MG capsule Instructed to take per normal schedule including DOS with sips water    sildenafil (VIAGRA) 100 mg tablet Hold 4/6 and 4/7      Reviewed with patient, in detail, instructions from "My Surgical Experience"  Instructed to avoid all  OTC Vit/Supp 1 week prior to surgery and to avoid NSAIDs 3 days prior to surgery per anesthesia instructions  Tylenol ok to take prn  Advised patient that [facility] will call with surgery arrival time and hospital directions the business day prior to surgery  Advised patient nothing eat or drink after midnight  Instructed to call surgeon's office in meantime with any new illness  Patient verbalized understanding of current visitor restrictions and advised that he/she can confirm these at time of arrival call with [facility]  Patient verbalized understanding and knows to call surgeon's office with any additional questions prior to surgery

## 2022-04-07 ENCOUNTER — HOSPITAL ENCOUNTER (OUTPATIENT)
Facility: HOSPITAL | Age: 57
Setting detail: OUTPATIENT SURGERY
Discharge: HOME | End: 2022-04-08
Attending: OTOLARYNGOLOGY | Admitting: OTOLARYNGOLOGY
Payer: COMMERCIAL

## 2022-04-07 ENCOUNTER — ANESTHESIA (OUTPATIENT)
Dept: PERIOP | Facility: HOSPITAL | Age: 57
End: 2022-04-07
Payer: COMMERCIAL

## 2022-04-07 DIAGNOSIS — G47.33 OBSTRUCTIVE SLEEP APNEA SYNDROME: Primary | ICD-10-CM

## 2022-04-07 PROCEDURE — 42145 REPAIR PALATE PHARYNX/UVULA: CPT | Performed by: OTOLARYNGOLOGY

## 2022-04-07 RX ORDER — NEOSTIGMINE METHYLSULFATE 1 MG/ML
INJECTION INTRAVENOUS AS NEEDED
Status: DISCONTINUED | OUTPATIENT
Start: 2022-04-07 | End: 2022-04-07

## 2022-04-07 RX ORDER — DEXMEDETOMIDINE HYDROCHLORIDE 100 UG/ML
INJECTION, SOLUTION INTRAVENOUS AS NEEDED
Status: DISCONTINUED | OUTPATIENT
Start: 2022-04-07 | End: 2022-04-07

## 2022-04-07 RX ORDER — LIDOCAINE HYDROCHLORIDE 10 MG/ML
INJECTION, SOLUTION EPIDURAL; INFILTRATION; INTRACAUDAL; PERINEURAL AS NEEDED
Status: DISCONTINUED | OUTPATIENT
Start: 2022-04-07 | End: 2022-04-07

## 2022-04-07 RX ORDER — GLYCOPYRROLATE 0.2 MG/ML
INJECTION INTRAMUSCULAR; INTRAVENOUS AS NEEDED
Status: DISCONTINUED | OUTPATIENT
Start: 2022-04-07 | End: 2022-04-07

## 2022-04-07 RX ORDER — DEXAMETHASONE SODIUM PHOSPHATE 10 MG/ML
INJECTION, SOLUTION INTRAMUSCULAR; INTRAVENOUS AS NEEDED
Status: DISCONTINUED | OUTPATIENT
Start: 2022-04-07 | End: 2022-04-07

## 2022-04-07 RX ORDER — OXYCODONE HCL 5 MG/5 ML
5 SOLUTION, ORAL ORAL EVERY 4 HOURS PRN
Status: DISCONTINUED | OUTPATIENT
Start: 2022-04-07 | End: 2022-04-08 | Stop reason: HOSPADM

## 2022-04-07 RX ORDER — FLUOXETINE HYDROCHLORIDE 20 MG/1
20 CAPSULE ORAL DAILY
Status: DISCONTINUED | OUTPATIENT
Start: 2022-04-08 | End: 2022-04-07

## 2022-04-07 RX ORDER — SODIUM CHLORIDE, SODIUM LACTATE, POTASSIUM CHLORIDE, CALCIUM CHLORIDE 600; 310; 30; 20 MG/100ML; MG/100ML; MG/100ML; MG/100ML
125 INJECTION, SOLUTION INTRAVENOUS CONTINUOUS
Status: DISCONTINUED | OUTPATIENT
Start: 2022-04-07 | End: 2022-04-07

## 2022-04-07 RX ORDER — DEXTROSE, SODIUM CHLORIDE, AND POTASSIUM CHLORIDE 5; .45; .15 G/100ML; G/100ML; G/100ML
75 INJECTION INTRAVENOUS CONTINUOUS
Status: DISCONTINUED | OUTPATIENT
Start: 2022-04-07 | End: 2022-04-08 | Stop reason: HOSPADM

## 2022-04-07 RX ORDER — ONDANSETRON 2 MG/ML
INJECTION INTRAMUSCULAR; INTRAVENOUS AS NEEDED
Status: DISCONTINUED | OUTPATIENT
Start: 2022-04-07 | End: 2022-04-07

## 2022-04-07 RX ORDER — ACETAMINOPHEN 325 MG/1
650 TABLET ORAL EVERY 6 HOURS SCHEDULED
Status: DISCONTINUED | OUTPATIENT
Start: 2022-04-07 | End: 2022-04-08 | Stop reason: HOSPADM

## 2022-04-07 RX ORDER — FENTANYL CITRATE/PF 50 MCG/ML
25 SYRINGE (ML) INJECTION
Status: DISCONTINUED | OUTPATIENT
Start: 2022-04-07 | End: 2022-04-07

## 2022-04-07 RX ORDER — SODIUM CHLORIDE, SODIUM LACTATE, POTASSIUM CHLORIDE, CALCIUM CHLORIDE 600; 310; 30; 20 MG/100ML; MG/100ML; MG/100ML; MG/100ML
20 INJECTION, SOLUTION INTRAVENOUS CONTINUOUS
Status: DISCONTINUED | OUTPATIENT
Start: 2022-04-07 | End: 2022-04-07

## 2022-04-07 RX ORDER — PROPOFOL 10 MG/ML
INJECTION, EMULSION INTRAVENOUS AS NEEDED
Status: DISCONTINUED | OUTPATIENT
Start: 2022-04-07 | End: 2022-04-07

## 2022-04-07 RX ORDER — MAGNESIUM HYDROXIDE 1200 MG/15ML
LIQUID ORAL AS NEEDED
Status: DISCONTINUED | OUTPATIENT
Start: 2022-04-07 | End: 2022-04-07 | Stop reason: HOSPADM

## 2022-04-07 RX ORDER — FENTANYL CITRATE 50 UG/ML
INJECTION, SOLUTION INTRAMUSCULAR; INTRAVENOUS AS NEEDED
Status: DISCONTINUED | OUTPATIENT
Start: 2022-04-07 | End: 2022-04-07

## 2022-04-07 RX ORDER — FLUOXETINE HYDROCHLORIDE 20 MG/1
60 CAPSULE ORAL DAILY
Status: DISCONTINUED | OUTPATIENT
Start: 2022-04-08 | End: 2022-04-08 | Stop reason: HOSPADM

## 2022-04-07 RX ORDER — MIDAZOLAM HYDROCHLORIDE 2 MG/2ML
INJECTION, SOLUTION INTRAMUSCULAR; INTRAVENOUS AS NEEDED
Status: DISCONTINUED | OUTPATIENT
Start: 2022-04-07 | End: 2022-04-07

## 2022-04-07 RX ORDER — ROCURONIUM BROMIDE 10 MG/ML
INJECTION, SOLUTION INTRAVENOUS AS NEEDED
Status: DISCONTINUED | OUTPATIENT
Start: 2022-04-07 | End: 2022-04-07

## 2022-04-07 RX ORDER — DOCUSATE SODIUM 100 MG/1
100 CAPSULE, LIQUID FILLED ORAL 2 TIMES DAILY
Status: DISCONTINUED | OUTPATIENT
Start: 2022-04-07 | End: 2022-04-08 | Stop reason: HOSPADM

## 2022-04-07 RX ORDER — ONDANSETRON 2 MG/ML
4 INJECTION INTRAMUSCULAR; INTRAVENOUS ONCE AS NEEDED
Status: DISCONTINUED | OUTPATIENT
Start: 2022-04-07 | End: 2022-04-07

## 2022-04-07 RX ADMIN — ROCURONIUM BROMIDE 30 MG: 10 SOLUTION INTRAVENOUS at 14:24

## 2022-04-07 RX ADMIN — LIDOCAINE HYDROCHLORIDE 100 MG: 10 INJECTION, SOLUTION EPIDURAL; INFILTRATION; INTRACAUDAL at 15:32

## 2022-04-07 RX ADMIN — FENTANYL CITRATE 25 MCG: 50 INJECTION INTRAMUSCULAR; INTRAVENOUS at 16:16

## 2022-04-07 RX ADMIN — DOCUSATE SODIUM 100 MG: 100 CAPSULE, LIQUID FILLED ORAL at 17:35

## 2022-04-07 RX ADMIN — MIDAZOLAM HYDROCHLORIDE 2 MG: 1 INJECTION, SOLUTION INTRAMUSCULAR; INTRAVENOUS at 14:17

## 2022-04-07 RX ADMIN — DEXMEDETOMIDINE HYDROCHLORIDE 8 MCG: 100 INJECTION, SOLUTION INTRAVENOUS at 15:07

## 2022-04-07 RX ADMIN — DEXMEDETOMIDINE HYDROCHLORIDE 8 MCG: 100 INJECTION, SOLUTION INTRAVENOUS at 14:47

## 2022-04-07 RX ADMIN — FENTANYL CITRATE 50 MCG: 50 INJECTION, SOLUTION INTRAMUSCULAR; INTRAVENOUS at 14:24

## 2022-04-07 RX ADMIN — IBUPROFEN 600 MG: 100 SUSPENSION ORAL at 21:58

## 2022-04-07 RX ADMIN — ROCURONIUM BROMIDE 20 MG: 10 SOLUTION INTRAVENOUS at 14:41

## 2022-04-07 RX ADMIN — NEOSTIGMINE METHYLSULFATE 3 MG: 1 INJECTION INTRAVENOUS at 15:33

## 2022-04-07 RX ADMIN — DEXAMETHASONE SODIUM PHOSPHATE 10 MG: 10 INJECTION, SOLUTION INTRAMUSCULAR; INTRAVENOUS at 14:24

## 2022-04-07 RX ADMIN — PROPOFOL 200 MG: 10 INJECTION, EMULSION INTRAVENOUS at 14:24

## 2022-04-07 RX ADMIN — ONDANSETRON 4 MG: 2 INJECTION INTRAMUSCULAR; INTRAVENOUS at 15:23

## 2022-04-07 RX ADMIN — ACETAMINOPHEN 650 MG: 325 TABLET ORAL at 17:34

## 2022-04-07 RX ADMIN — LIDOCAINE HYDROCHLORIDE 50 MG: 10 INJECTION, SOLUTION EPIDURAL; INFILTRATION; INTRACAUDAL at 14:24

## 2022-04-07 RX ADMIN — FENTANYL CITRATE 25 MCG: 50 INJECTION INTRAMUSCULAR; INTRAVENOUS at 16:19

## 2022-04-07 RX ADMIN — FENTANYL CITRATE 25 MCG: 50 INJECTION INTRAMUSCULAR; INTRAVENOUS at 16:22

## 2022-04-07 RX ADMIN — DEXMEDETOMIDINE HYDROCHLORIDE 4 MCG: 100 INJECTION, SOLUTION INTRAVENOUS at 15:28

## 2022-04-07 RX ADMIN — DEXTROSE, SODIUM CHLORIDE, AND POTASSIUM CHLORIDE 75 ML/HR: 5; .45; .15 INJECTION INTRAVENOUS at 17:39

## 2022-04-07 RX ADMIN — FENTANYL CITRATE 50 MCG: 50 INJECTION, SOLUTION INTRAMUSCULAR; INTRAVENOUS at 14:34

## 2022-04-07 RX ADMIN — SODIUM CHLORIDE, SODIUM LACTATE, POTASSIUM CHLORIDE, AND CALCIUM CHLORIDE: .6; .31; .03; .02 INJECTION, SOLUTION INTRAVENOUS at 15:35

## 2022-04-07 RX ADMIN — GLYCOPYRROLATE 0.6 MG: 0.2 INJECTION, SOLUTION INTRAMUSCULAR; INTRAVENOUS at 15:33

## 2022-04-07 RX ADMIN — SODIUM CHLORIDE, SODIUM LACTATE, POTASSIUM CHLORIDE, AND CALCIUM CHLORIDE: .6; .31; .03; .02 INJECTION, SOLUTION INTRAVENOUS at 14:17

## 2022-04-07 RX ADMIN — IBUPROFEN 600 MG: 100 SUSPENSION ORAL at 17:39

## 2022-04-07 NOTE — H&P
Charli Howell is a 61 y  o male who presents for re-evaluation of sleep apnea  He is still using his BiPAP but snoring terribly  Underwent DISE showing minimal concentric collapse, and no tongue base obstruction  He is interested in any surgery to decrease his apnea  Past Medical History:   Diagnosis Date    Anxiety     CPAP (continuous positive airway pressure) dependence     Depression     Sleep apnea        /73   Pulse 68   Temp (!) 96 8 °F (36 °C)   Resp 18   Ht 5' 8" (1 727 m)   Wt 84 8 kg (187 lb)   SpO2 98%   BMI 28 43 kg/m²       Physical Exam   Constitutional: Oriented to person, place, and time  Well-developed and well-nourished, no apparent distress, non-toxic appearance  Cooperative, able to hear and answer questions without difficulty  Voice: Normal voice quality  Head: Normocephalic, atraumatic  No scars, masses or lesions  Face: Symmetric, no edema, no sinus tenderness  Eyes: Vision grossly intact, extra-ocular movement intact  Ears: External ear normal   Bilateral tympanic membranes are intact with intact normal landmarks  No post-auricular erythema or tenderness  Nose: Septum midline, nares clear  Mucosa moist, turbinates well appearing  No crusting, polyps or discharge evident  Oral cavity: Dentition intact  Mucosa moist, lips normal   Tongue mobile, floor of mouth normal   Hard palate unremarkable  No masses or lesions  Oropharynx: Uvula is midline, soft palate normal   Unremarkable oropharyngeal inlet  Tonsils unremarkable  Posterior pharyngeal wall clear  No masses or lesions  Salivary glands:  Parotid glands and submandibular glands symmetric, no enlargement or tenderness  Neck: Normal laryngeal elevation with swallow  Trachea midline  No masses or lesions  No palpable adenopathy  Thyroid: normal in size, unremarkable without tenderness or palpable nodules  Pulmonary/Chest: Normal effort and rate  No respiratory distress     Musculoskeletal: Normal range of motion  Neurological: Cranial nerves 2-12 intact  Skin: Skin is warm and dry  Psychiatric: Normal mood and affect  CTAB  RRR  Abd soft NTND    A/P: Sleep apnea: Will plan for UPPP to help with his apnea and possible tolerance of his BiPAP  Risks, benefits, and alternatives for tonsillectomy and palatoplasty were discussed  Informed consent was obtained  Risks discussed were included, but not limited to, 4% risk of postoperative bleeding requiring operative intervention, the velopharyngeal insufficiency, tooth tongue or gum injury, and postoperative dehydration  We discussed the need for appropriate pain control to prevent dehydration  Follow-up 3 weeks after surgery

## 2022-04-07 NOTE — OP NOTE
OPERATIVE REPORT  PATIENT NAME: Esha Albert    :  1965  MRN: [id number]  Pt Location: AN OR ROOM 01    SURGERY DATE: 2022    Surgeon(s) and Role:     * Ryan Quintanilla MD - Primary    Preop Diagnosis:  Obstructive sleep apnea syndrome [G47 33]    Post-Op Diagnosis Codes:     * Obstructive sleep apnea syndrome [G47 33]    Procedure(s) (LRB):  UVULOPALATOPHARYNGOPLASTY (UPPP) (N/A)    Specimen(s):  * No specimens in log *    Estimated Blood Loss:   50 mL    Drains:  * No LDAs found *    Anesthesia Type:   General    Operative Indications:  Obstructive sleep apnea syndrome [G47 33]  BMI 28    Operative Findings:  2 + bilateral tonsils with severe number of tonsil stones  Significant bleeding from bilateral upper poles  Controlled completely with coblator cautery  Complications:   None    Procedure and Technique:  The patient was positively identified and transferred onto the operating table in the supine position  Appropriate monitoring devices were put in place, anesthesia was induced and the patient was intubated without difficulty  The operating room table was then turned 90 degrees, and a shoulder roll was placed  Before proceeding further, the time out procedure was completed  A McIvor oral gag was introduced opened and suspended from the edge of the Young stand  Palpation of the hard palate revealed no submucosal cleft  Red rubber tubes were passed through bilateral nasal cavities and used to retract the soft palate bilaterally  The right tonsil was grasped, retracted medially and dissected free of the surrounding tissue using the Coblation wand  In a similar fashion, the left tonsil was removed, and hemostasis was accomplished in bilateral tonsillar fossae using the coagulation function of the Coblation wand  The nasopharynx was examined, and no adenoid tissue was evident  The red rubber tubes were removed    The uvual was grasped, and the uvula, as well as a portion of the soft palate and anterior tonsillar pillars on both right and left sides were removed using the Coablation wand  Care was taken to leave an adequate margin of soft palate posterior to the free edge of the hard palate  The Coablation wand was used to remove an additional portion of posterior tonsillar pillars as well  The McIvor oral gag was let down for a minute, then opened and suspended again  The oropharynx was irrigated with saline which was suctioned free, and hemostasis was accomplished using the coagulation function of the Coablation wand  Free mucosal edges were then re-approximated along the cut margin of the soft palate and along tonsillar fossae on both sides using multiple interrupted 4-0 Vicryl stitches in a simple and horizontal mattress fashion  Notably improved pharyngeal dimensions were evident  The oral gag was let down and opened again multiple times during stitch placement to allow for venous drainage from the tongue  The McIvor oral gag was then removed, and anesthesia was reversed  The patient was awakened, extubated and taken to the recovery room in stable condition  All counts were correct at the end of the case, and no complications were encountered         I was present for the entire procedure and A qualified resident physician was not available    Patient Disposition:  PACU  and extubated and stable      SIGNATURE: Patricia Cade MD  DATE: April 7, 2022  TIME: 3:43 PM

## 2022-04-07 NOTE — ANESTHESIA PREPROCEDURE EVALUATION
Procedure:  UVULOPALATOPHARYNGOPLASTY (UPPP) (N/A Throat)    Relevant Problems   CARDIO   (+) Mixed hyperlipidemia      NEURO/PSYCH   (+) Anxiety   (+) Moderate episode of recurrent major depressive disorder (HCC)      PULMONARY   (+) Obstructive sleep apnea syndrome      Other   (+) Nicotine use disorder        Physical Exam    Airway    Mallampati score: III  TM Distance: >3 FB  Neck ROM: full     Dental   Comment: Poor dentition, no loose,     Cardiovascular      Pulmonary      Other Findings        Anesthesia Plan  ASA Score- 3     Anesthesia Type- general with ASA Monitors  Additional Monitors:   Airway Plan: ETT  Plan Factors-    Chart reviewed  Existing labs reviewed  Patient summary reviewed  Induction- intravenous  Postoperative Plan- Plan for postoperative opioid use  Informed Consent- Anesthetic plan and risks discussed with patient  I personally reviewed this patient with the CRNA  Discussed and agreed on the Anesthesia Plan with the CRNA  Lele Harris

## 2022-04-07 NOTE — ANESTHESIA POSTPROCEDURE EVALUATION
Post-Op Assessment Note    CV Status:  Stable  Pain Score: 0    Pain management: adequate     Mental Status:  Sleepy and arousable   Hydration Status:  Euvolemic   PONV Controlled:  Controlled   Airway Patency:  Patent      Post Op Vitals Reviewed: Yes      Staff: CRNA         No complications documented      BP   133/78   Temp  97 8   Pulse  77   Resp   18   SpO2   99

## 2022-04-08 VITALS
TEMPERATURE: 99.1 F | OXYGEN SATURATION: 92 % | RESPIRATION RATE: 17 BRPM | BODY MASS INDEX: 28.34 KG/M2 | HEART RATE: 75 BPM | HEIGHT: 68 IN | DIASTOLIC BLOOD PRESSURE: 73 MMHG | WEIGHT: 187 LBS | SYSTOLIC BLOOD PRESSURE: 125 MMHG

## 2022-04-08 RX ORDER — OXYCODONE HYDROCHLORIDE 5 MG/1
5 TABLET ORAL EVERY 4 HOURS PRN
Qty: 30 TABLET | Refills: 0 | Status: SHIPPED | OUTPATIENT
Start: 2022-04-08 | End: 2022-04-18

## 2022-04-08 RX ADMIN — FLUOXETINE 60 MG: 20 CAPSULE ORAL at 09:11

## 2022-04-08 RX ADMIN — ACETAMINOPHEN 650 MG: 325 TABLET ORAL at 05:02

## 2022-04-08 RX ADMIN — ACETAMINOPHEN 650 MG: 325 TABLET ORAL at 00:09

## 2022-04-08 RX ADMIN — IBUPROFEN 600 MG: 100 SUSPENSION ORAL at 10:33

## 2022-04-08 NOTE — PLAN OF CARE
Problem: PAIN - ADULT  Goal: Verbalizes/displays adequate comfort level or baseline comfort level  Description: Interventions:  - Encourage patient to monitor pain and request assistance  - Assess pain using appropriate pain scale  - Administer analgesics based on type and severity of pain and evaluate response  - Implement non-pharmacological measures as appropriate and evaluate response  - Consider cultural and social influences on pain and pain management  - Notify physician/advanced practitioner if interventions unsuccessful or patient reports new pain  Outcome: Progressing     Problem: INFECTION - ADULT  Goal: Absence or prevention of progression during hospitalization  Description: INTERVENTIONS:  - Assess and monitor for signs and symptoms of infection  - Monitor lab/diagnostic results  - Monitor all insertion sites, i e  indwelling lines, tubes, and drains  - Monitor endotracheal if appropriate and nasal secretions for changes in amount and color  - [place] appropriate cooling/warming therapies per order  - Administer medications as ordered  - Instruct and encourage patient and family to use good hand hygiene technique  - Identify and instruct in appropriate isolation precautions for identified infection/condition  Outcome: Progressing  Goal: Absence of fever/infection during neutropenic period  Description: INTERVENTIONS:  - Monitor WBC    Outcome: Progressing     Problem: SAFETY ADULT  Goal: Patient will remain free of falls  Description: INTERVENTIONS:  - Educate patient/family on patient safety including physical limitations  - Instruct patient to call for assistance with activity   - Consult OT/PT to assist with strengthening/mobility   - Keep Call bell within reach  - Keep bed low and locked with side rails adjusted as appropriate  - Keep care items and personal belongings within reach  - Initiate and maintain comfort rounds  - Make Fall Risk Sign visible to staff  - Offer Toileting every  Hours, in advance of need  - Initiate/Maintain alarm  - Obtain necessary fall risk management equipment:   - Apply yellow socks and bracelet for high fall risk patients  - Consider moving patient to room near nurses station  Outcome: Progressing  Goal: Maintain or return to baseline ADL function  Description: INTERVENTIONS:  -  Assess patient's ability to carry out ADLs; assess patient's baseline for ADL function and identify physical deficits which impact ability to perform ADLs (bathing, care of mouth/teeth, toileting, grooming, dressing, etc )  - Assess/evaluate cause of self-care deficits   - Assess range of motion  - Assess patient's mobility; develop plan if impaired  - Assess patient's need for assistive devices and provide as appropriate  - Encourage maximum independence but intervene and supervise when necessary  - Involve family in performance of ADLs  - Assess for home care needs following discharge   - Consider OT consult to assist with ADL evaluation and planning for discharge  - Provide patient education as appropriate  Outcome: Progressing  Goal: Maintains/Returns to pre admission functional level  Description: INTERVENTIONS:  - Perform BMAT or MOVE assessment daily    - Set and communicate daily mobility goal to care team and patient/family/caregiver  - Collaborate with rehabilitation services on mobility goals if consulted  - Perform Range of Motion  times a day  - Reposition patient every  hours    - Dangle patient  times a day  - Stand patient  times a day  - Ambulate patient  times a day  - Out of bed to chair times a day   - Out of bed for meals  times a day  - Out of bed for toileting  - Record patient progress and toleration of activity level   Outcome: Progressing     Problem: DISCHARGE PLANNING  Goal: Discharge to home or other facility with appropriate resources  Description: INTERVENTIONS:  - Identify barriers to discharge w/patient and caregiver  - Arrange for needed discharge resources and transportation as appropriate  - Identify discharge learning needs (meds, wound care, etc )  - Arrange for interpretive services to assist at discharge as needed  - Refer to Case Management Department for coordinating discharge planning if the patient needs post-hospital services based on physician/advanced practitioner order or complex needs related to functional status, cognitive ability, or social support system  Outcome: Progressing     Problem: Knowledge Deficit  Goal: Patient/family/caregiver demonstrates understanding of disease process, treatment plan, medications, and discharge instructions  Description: Complete learning assessment and assess knowledge base    Interventions:  - Provide teaching at level of understanding  - Provide teaching via preferred learning methods  Outcome: Progressing     Problem: Potential for Falls  Goal: Patient will remain free of falls  Description: INTERVENTIONS:  - Educate patient/family on patient safety including physical limitations  - Instruct patient to call for assistance with activity   - Consult OT/PT to assist with strengthening/mobility   - Keep Call bell within reach  - Keep bed low and locked with side rails adjusted as appropriate  - Keep care items and personal belongings within reach  - Initiate and maintain comfort rounds  - Make Fall Risk Sign visible to staff  - Offer Toileting every  Hours, in advance of need  - Initiate/Maintain alarm  - Obtain necessary fall risk management equipment:   - Apply yellow socks and bracelet for high fall risk patients  - Consider moving patient to room near nurses station  Outcome: Progressing

## 2022-05-10 ENCOUNTER — OFFICE VISIT (OUTPATIENT)
Dept: PSYCHIATRY | Facility: CLINIC | Age: 57
End: 2022-05-10
Payer: COMMERCIAL

## 2022-05-10 DIAGNOSIS — F17.200 NICOTINE USE DISORDER: ICD-10-CM

## 2022-05-10 DIAGNOSIS — F33.1 MODERATE EPISODE OF RECURRENT MAJOR DEPRESSIVE DISORDER (HCC): Primary | ICD-10-CM

## 2022-05-10 PROCEDURE — 99213 OFFICE O/P EST LOW 20 MIN: CPT | Performed by: STUDENT IN AN ORGANIZED HEALTH CARE EDUCATION/TRAINING PROGRAM

## 2022-05-10 PROCEDURE — 90833 PSYTX W PT W E/M 30 MIN: CPT | Performed by: STUDENT IN AN ORGANIZED HEALTH CARE EDUCATION/TRAINING PROGRAM
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
## 2022-08-03 ENCOUNTER — TELEPHONE (OUTPATIENT)
Dept: PSYCHIATRY | Facility: CLINIC | Age: 57
End: 2022-08-03

## 2022-08-03 NOTE — TELEPHONE ENCOUNTER
Patient called the office inquiring about provider completing paperwork for employer stating the medications he is taking and that he is "good for service"  Writer informed patient would need to come into the office and fill out an CHRIS  Patient stated he would stop into the office on the morning of 8/4/22 to sign CHRIS and bring paperwork to be completed by provider  Patient did not have a specific date that paperwork needs to be completed by

## 2022-08-04 NOTE — TELEPHONE ENCOUNTER
Patient came into the office on 8/4/22 to complete CHRIS and provide documents  Documents will be scanned into patients chart and placed in mailbox for provider to create a letter for patient including requirements stated on documents  Patient requested for documents to be faxed to number on documents and a call to be made for him to  the documents after they have been faxed

## 2022-08-16 NOTE — TELEPHONE ENCOUNTER
Front Office was unable to locate completed paper work  Office placed another copy of forms to be completed in provider's mailbox  [address] [facility] office awaiting completed paperwork

## 2022-08-16 NOTE — TELEPHONE ENCOUNTER
Letter was scanned into chart, faxed and call was made to patient  Voice mail was left informing that letter can be picked up at

## 2022-08-19 ENCOUNTER — OFFICE VISIT (OUTPATIENT)
Dept: PSYCHIATRY | Facility: CLINIC | Age: 57
End: 2022-08-19
Payer: COMMERCIAL

## 2022-08-19 ENCOUNTER — TELEPHONE (OUTPATIENT)
Dept: PSYCHIATRY | Facility: CLINIC | Age: 57
End: 2022-08-19

## 2022-08-19 DIAGNOSIS — F33.0 MILD EPISODE OF RECURRENT MAJOR DEPRESSIVE DISORDER (HCC): Primary | ICD-10-CM

## 2022-08-19 DIAGNOSIS — F17.200 NICOTINE USE DISORDER: ICD-10-CM

## 2022-08-19 PROCEDURE — 99213 OFFICE O/P EST LOW 20 MIN: CPT | Performed by: STUDENT IN AN ORGANIZED HEALTH CARE EDUCATION/TRAINING PROGRAM

## 2022-08-19 PROCEDURE — 90833 PSYTX W PT W E/M 30 MIN: CPT | Performed by: STUDENT IN AN ORGANIZED HEALTH CARE EDUCATION/TRAINING PROGRAM

## 2022-08-19 RX ORDER — FLUOXETINE HYDROCHLORIDE 20 MG/1
20 CAPSULE ORAL DAILY
Qty: 90 CAPSULE | Refills: 2 | Status: SHIPPED | OUTPATIENT
Start: 2022-08-19 | End: 2022-08-19 | Stop reason: SDUPTHER

## 2022-08-19 RX ORDER — FLUOXETINE HYDROCHLORIDE 20 MG/1
20 CAPSULE ORAL DAILY
Qty: 90 CAPSULE | Refills: 2 | Status: SHIPPED | OUTPATIENT
Start: 2022-08-19

## 2022-08-19 RX ORDER — FLUOXETINE HYDROCHLORIDE 40 MG/1
40 CAPSULE ORAL DAILY
Qty: 90 CAPSULE | Refills: 2 | Status: SHIPPED | OUTPATIENT
Start: 2022-08-19

## 2022-08-19 NOTE — BH TREATMENT PLAN
TREATMENT PLAN (Medication Management Only)        [facility]    Name and Date of Birth:  Marta Otoole 61 y o  1965  Date of Treatment Plan: August 19, 2022  Diagnosis/Diagnoses:    1  Moderate episode of recurrent major depressive disorder ([facility] [address] )    2  Nicotine use disorder    3  Mild episode of recurrent major depressive disorder [facility])      Strengths/Personal Resources for Self-Care: supportive family, supportive friends, taking medications as prescribed, ability to adapt to life changes, ability to communicate needs, ability to communicate well, ability to listen, ability to reason, general fund of knowledge, good physical health, good understanding of illness, ability to negotiate basic needs, being resoureceful, self-reliance, sense of humor, special hobby/interest, stable employment, willingness to work on problems  Area/Areas of need (in own words): anxiety symptoms, depressive symptoms, anger control  1  Long Term Goal: maintain control of mood  Target Date:6 months - 2/19/2023  Person/Persons responsible for completion of goal: Cleveland May  Short Term Objective (s) - How will we reach this goal?:   A  Provider new recommended medication/dosage changes and/or continue medication(s): continue current medications as prescribed  B  Attend medication management appointments regularly  C  Take psychiatric medications responsibly  D  Sleep better  E  Communicate more with my son  F  Consider leaving my relationship  Target Date:6 months - 2/19/2023  Person/Persons Responsible for Completion of Goal: [place]  Progress Towards Goals: continuing treatment  Treatment Modality: medication management every 3 months  Review due 180 days from date of this plan: 6 months - 2/19/2023  Expected length of service: ongoing treatment  My Physician/PA/NP and I have developed this plan together and I agree to work on the goals and objectives   I understand the treatment goals that were developed for my treatment  Treatment Plan completed with assistance and input from patient and verbal consent provided  Treatment plan was not signed at time of office visit secondary to COVID-19 social distancing guidelines

## 2022-08-19 NOTE — TELEPHONE ENCOUNTER
Patient came in for his appt for 8/19/22 and pick the copy of the letter mention in this thread along with fax cover and fax receipt
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
## 2022-08-24 ENCOUNTER — TELEPHONE (OUTPATIENT)
Dept: PSYCHIATRY | Facility: CLINIC | Age: 57
End: 2022-08-24

## 2022-08-24 NOTE — TELEPHONE ENCOUNTER
Pt called requesting sooner appt than currently scheduled 11/29/22 at 11:30  Pt is now scheduled for 10/5/22 at 10:30

## 2022-10-04 NOTE — PSYCH
MEDICATION MANAGEMENT NOTE        [address]      Name and Date of Birth:  Allison Alcaraz 61 y o  1965 MRN: [id number]    Date of Visit: October 5, 2022    Reason for Visit: Follow-up visit for medication management     SUBJECTIVE:    Allison Alcaraz is a 61 y o  male with past psychiatric history significant for major depressive disorder, complicated bereheavement, and nicotine use disorder who was personally seen and evaluated today at the [address] [place] [address] E outpatient clinic for follow-up and medication management  Jeannie Blackman presents as calm, pleasant, and cooperative  His thoughts are organized and linear  He completes assessment without difficulty  [place] endorses compliance with psychotropic medication regimen consisting of Prozac 60mg Daily  He denies adverse medication side effects  Clevleand reports behavioral and mood stability over the last 2 months  He is pleased to share that he and his girlfriend are "better"  He denies recent verbal outbursts or law enforcement involvement  He does continue to state that she "nags" his frequently but he has learned to "walk away"  Extensive supportive therapy and joint problem solving utilized  Jeannie Blackman was challenged regarding this relationship and ways it's disadvantageous for his psychological growth  Acutely, Jeannie Blackman denies most neurovegetative symptoms of depression  His concentration/focus is at baseline  He reports adequate sleep, appetite, and energy  He has gained 10lbs over the last 3 months  He is hoping to be more active  Jeannie Blackman denies SI/HI when asked  He is optimistic about the future of his relationship  He voices interest individual therapy, suggestive of self preservation  He was given informational packet today regarding therapists and agencies in the community  Jeannie Blackman is without crying spells or anhedonia  He continues to function well at work   He is now on nightshift which he enjoys  Zurdo Gamez reports appropriate control of anxiety at the moment  He laughs throughout today's session and does not appear tense or on-edge  He has not experienced recent panic symptoms  During today's examination, Zurdo Gamez does not exhibit objective evidence of misael/hypomania or sampson psychosis  Zurdo Gamez denies recent ETOH or illicit substance abuse  He continues to use nicotine products but states that he has prioritized less use  He can now go "4 days without a cigarette"  He offers no further concerns  Current Rating Scores:     None completed today  Review Of Systems:      Constitutional recent weight gain (10 lbs) and as noted in HPI   ENT negative   Cardiovascular negative   Respiratory cough   Gastrointestinal negative   Genitourinary negative   Musculoskeletal negative   Integumentary negative   Neurological negative   Endocrine negative   Other Symptoms none, all other systems are negative          Past Psychiatric History: (unchanged information from previous note copied and italicized) - Information that is bolded has been updated       Inpatient psychiatric admissions: Denies  Prior outpatient psychiatric linkage: Denies - received Sertraline from PCP  Past/current psychotherapy: Denies  History of suicidal attempts/gestures: Denies  History of violence/aggressive behaviors: Recently court-mandated to "anger management" classes - completed the 6 week program in July 2022  Psychotropic medication trials: Sertraline 100mg Daily (experiencing ED), Wellbutrin (over activated, more anxious), Trintellix, Prozac (now)  Substance abuse inpatient/outpatient rehabilitation: Denies     Substance Abuse History: (unchanged information from previous note copied and italicized) - Information that is bolded has been updated       No history of ETOH or illict substance  He does endorse a use of tobacco abuse (previous 2pack/day smoker)  No past legal actions or arrests secondary to substance intoxication   The patient denies prior DWIs/DUIs  No history of outpatient/inpatient rehabilitation programs  [place] does not exhibit objective evidence of substance withdrawal during today's examination nor does [place] appear under the influence of any psychoactive substance           Social History: (unchanged information from previous note copied and italicized) - Information that is bolded has been updated       Developmental: Denies a history of milestone/developmental delay  Denies a history of in-utero exposure to toxins/illicit substances  There is no documented history of IEP or need for special education  Education: high school diploma/GED - graduated from [employer]  Marital history:  - He had 2 sons with his ex-wife  He is now involved in a new relationship    Living arrangement, social support: significant other  He is still in contact with his younger son (24years old) who he sees weekly    Occupational History: Employed as a  - works 5 days per week (2 days, 2 nights, and 1 midday shift)  Access to firearms: Denies direct access to weapons/firearms  [place] [employer] no history of arrests or violence with a deadly weapon          Traumatic History: (unchanged information from previous note copied and italicized) - Information that is bolded has been updated    Lakishatristan Jackie is reported  Other Traumatic Events: Tragically lost his son to a MVA and his mother to cancer in 2018      Past Medical History:    Past Medical History:   Diagnosis Date    Anxiety     CPAP (continuous positive airway pressure) dependence     Depression     Sleep apnea         Past Surgical History:   Procedure Laterality Date    MULTIPLE TOOTH EXTRACTIONS      SD PALATOPHAYNGOPLASTY N/A 4/7/2022    Procedure: UVULOPALATOPHARYNGOPLASTY (UPPP);   Surgeon: Braydon Blackmon MD;  [facility];  Service: ENT    TESTICLE SURGERY       No Known Allergies    Substance Abuse History:    Social History Substance and Sexual Activity   Alcohol Use Not Currently     Social History     Substance and Sexual Activity   Drug Use No       Social History:    Social History     Socioeconomic History    Marital status: Single     Spouse name: Not on file    Number of children: Not on file    Years of education: Not on file    Highest education level: Not on file   Occupational History    Not on file   Tobacco Use    Smoking status: Current Every Day Smoker     Packs/day: 0 25    Smokeless tobacco: Current User    Tobacco comment: 1 cigarette every few days   Vaping Use    Vaping Use: Never used   Substance and Sexual Activity    Alcohol use: Not Currently    Drug use: No    Sexual activity: Yes     Partners: Female   Other Topics Concern    Not on file   Social History Narrative    Not on file     Social Determinants of Health     Financial Resource Strain: Not on file   Food Insecurity: Not on file   Transportation Needs: Not on file   Physical Activity: Not on file   Stress: Not on file   Social Connections: Not on file   Intimate Partner Violence: Not on file   Housing Stability: Not on file       Family Psychiatric History:     Family History   Problem Relation Age of Onset    Anxiety disorder Father         Previously on Valium    Depression Father        History Review: The following portions of the patient's history were reviewed and updated as appropriate: allergies, current medications, past family history, past medical history, past social history, past surgical history and problem list          OBJECTIVE:     Vital signs in last 24 hours: There were no vitals filed for this visit      Mental Status Evaluation:    Appearance age appropriate, casually dressed, dressed appropriately, looks stated age   Behavior pleasant, cooperative, calm, good eye contact   Speech normal rate, normal volume, normal pitch   Mood euthymic   Affect normal range and intensity, appropriate   Thought Processes organized, logical, goal directed   Associations intact associations   Thought Content no overt delusions   Perceptual Disturbances: no auditory hallucinations, no visual hallucinations   Abnormal Thoughts  Risk Potential Suicidal ideation - None at present  Homicidal ideation - None at present  Potential for aggression - No   Orientation oriented to person, place, time/date and situation   Memory recent and remote memory grossly intact   Consciousness alert and awake   Attention Span Concentration Span attention span and concentration are age appropriate   Intellect appears to be of average intelligence   Insight intact and good   Judgement intact and good   Muscle Strength and  Gait normal gait and normal balance   Motor activity no abnormal movements   Language no difficulty naming common objects   Fund of Knowledge adequate knowledge of current events   Pain none   Pain Scale 0       Laboratory Results: I have personally reviewed all pertinent laboratory/tests results    Recent Labs (last 4 months):   No visits with results within 4 Month(s) from this visit  Latest known visit with results is:   Office Visit on 01/19/2022   Component Date Value    Ventricular Rate 01/19/2022 69     Atrial Rate 01/19/2022 69     FL Interval 01/19/2022 172     QRSD Interval 01/19/2022 94     QT Interval 01/19/2022 388     QTC Interval 01/19/2022 415     P Axis 01/19/2022 31     QRS Axis 01/19/2022 61     T Wave Axis 01/19/2022 59        Suicide/Homicide Risk Assessment:    The following interventions are recommended: no intervention changes needed      Lethality Statement:    Based on today's assessment and clinical criteria, Sera Jenkins contracts for safety and is not an imminent risk of harm to self or others  Outpatient level of care is deemed appropriate at this current time   Vasu Rj understands that if they can no longer contract for safety, they need to call the office or report to their nearest Emergency Room for immediate evaluation  Assessment/Plan:     Bard Puga a 57 y  o  male with past psychiatric history significant for major depressive disorder, complicated bereheavement, and nicotine use disorder who was personally seen and evaluated today at the [address] [place] [address] E outpatient clinic for follow-up and medication management  [place] endorses a neurotypical history without mood symptomatology or anxiety prior to 2 years ago  He denies prior psychiatric treatment or counseling before 2018 and no previous medication trials  Approximately two years ago, [place]'s oldest son was killed in a MVA after hydroplaning during a wet evening  Approximately 3-6 months after his death, [place]'s mother succumbed to a terminal illness (cancer)  These two significant losses were the catalyst for new-onset depressive symptomatology  Over the past 1 5-2 years, Bokathryn Pham admits to episodic sadness and intermittent feelings of loss  These symptoms intensified since March/April 2020, likely secondary to COVID-19, mandatory quearentine, and subsequent disconnect from others  In June 2020, during an office visit with his PCP, Bo Pham endorsed worsening depression and anxiety and was started on Sertraline which was titrated to 100mg Daily  He endorsed improvement in mood and resolution in daily anxiety, however, he was experiencing sexual dysfunction (erectile dysfunction) which was problematic  As such, he was tapered of Zoloft and Wellbutrin was started which was over-stimulating and induced anxiety  This agent was subsequently discontinued and Bo Pham was started on [employer], [employer] did not cover  Today, he remains on Prozac and is tolerating it well      Today's Plan:     Psychopharmacologically, [place] reports tolerability and benefit from Prozac  He denies current lethality concern  He believes his mood and anxiety are well controlled   He does voice interest in therapy and was given informational packet  He offers no further concerns       DSM-V Diagnoses:      1 ) Major Depressive Episode, recurrent, with anxious distress  2 ) Nicotine Use Disorder        Treatment Recommendations/Precautions:     1 ) Major Depressive Episode, recurrent, with anxious distress  - Hx of medication trials including: Sertraline (tolerated well but experience sexual dysfunction), Wellbutrin (too activating)  - Continue Prozac 60mg Daily  - Discussed referral for psychotherapy - agreeable today, given packet [facility]   - Encouraged to pursue couples therapy with his girlfriend   - Given information to attend "Coping with loss" [facility] at [facility]   - Psychoeducation provided regarding the importance of exercise and healthy dietary choices and their impact on mood, energy, and motivation  - Counseled to avoid ETOH, illict substances, and nicotine secondary to the detrimental effects of these substances on mental and physical health     2  ) Nicotine Use Disorder  - States that he previously smoked 2 packs per day - he is now smoking less frequently - states today that he can go "4 days without a cigarette"  - Completed Rx Chantix by PCP- in January 2022  - Completed hypnosis   - Offered trial of Wellbutrin (this time with SR version rather than XL version) which would be appropriate and likely beneficial for mood, smoking, and struggles with ED, which are not entirely related to SSRI use  - Psychoeducation provided regarding the importance of exercise and healthy dietary choices and their impact on mood, energy, and motivation  - Counseled to avoid ETOH, illict substances, and nicotine secondary to the detrimental effects of these substances on mental and physical health        Medication management every 3 months  Will start individual therapy with own therapist  Aware of need to follow up with family physician for medical issues  Aware of 24 hour and weekend coverage for urgent situations accessed by calling [facility] main practice number    Medications Risks/Benefits      Risks, Benefits And Possible Side Effects Of Medications:    Risks, benefits, and possible side effects of medications explained to Emi Martinezmejia including risk of suicidality and serotonin syndrome related to treatment with antidepressants  He verbalizes understanding and agreement for treatment  Controlled Medication Discussion:     No recent records found for controlled prescriptions according to [place] Prescription Drug Monitoring Program    Psychotherapy Provided:     Individual psychotherapy provided: Yes  Counseling was provided during the session today for 17 minutes  Medication education provided to Emi Aguilera  Recent stressors discussed with Emi Aguilera including family issues, relationship problems, death of son, job stress, health issues, medical problems, limited support, everyday stressors and occasional anxiety  Coping strategies reviewed with [place]  Educated on importance of medication and treatment compliance  Importance of follow up with family physician for medical issues reviewed with Emi Aguilera  Supportive therapy provided  Treatment Plan:    Completed and signed during the session: Not applicable - Treatment Plan not due at this session    Note Share Disclaimer:      This note was not shared with the patient due to reasonable likelihood of causing patient harm      Alfreda Velasco MD  Board Certified Diplomate of the [address] [place] [address] Rd , [address] of Psychiatry and Neurology  10/05/22

## 2022-10-05 ENCOUNTER — OFFICE VISIT (OUTPATIENT)
Dept: PSYCHIATRY | Facility: CLINIC | Age: 57
End: 2022-10-05
Payer: COMMERCIAL

## 2022-10-05 DIAGNOSIS — F33.1 MODERATE EPISODE OF RECURRENT MAJOR DEPRESSIVE DISORDER (HCC): Primary | ICD-10-CM

## 2022-10-05 DIAGNOSIS — F17.200 NICOTINE USE DISORDER: ICD-10-CM

## 2022-10-05 PROCEDURE — 90833 PSYTX W PT W E/M 30 MIN: CPT | Performed by: STUDENT IN AN ORGANIZED HEALTH CARE EDUCATION/TRAINING PROGRAM

## 2022-10-05 PROCEDURE — 99213 OFFICE O/P EST LOW 20 MIN: CPT | Performed by: STUDENT IN AN ORGANIZED HEALTH CARE EDUCATION/TRAINING PROGRAM

## 2022-10-12 PROBLEM — Z00.00 HEALTHCARE MAINTENANCE: Status: RESOLVED | Noted: 2022-01-10 | Resolved: 2022-10-12

## 2022-11-07 ENCOUNTER — TELEPHONE (OUTPATIENT)
Dept: PSYCHIATRY | Facility: CLINIC | Age: 57
End: 2022-11-07

## 2022-11-18 ENCOUNTER — TELEPHONE (OUTPATIENT)
Dept: SLEEP CENTER | Facility: CLINIC | Age: 57
End: 2022-11-18

## 2022-11-18 NOTE — TELEPHONE ENCOUNTER
----- Message from Rober Quintana MD sent at 11/17/2022  8:59 PM EST -----  Approve      ----- Message -----  From: Valeri Dominguez  Sent: 11/17/2022   9:56 AM EST  To: [facility] [place]    This Diagnostic sleep study needs approval      If approved please sign and return to clerical pool  If denied please include reasons why  Also provide alternative testing if warranted  Please sign and return to clerical pool

## 2023-01-10 ENCOUNTER — OFFICE VISIT (OUTPATIENT)
Dept: FAMILY MEDICINE CLINIC | Facility: CLINIC | Age: 58
End: 2023-01-10

## 2023-01-10 VITALS
WEIGHT: 201.4 LBS | BODY MASS INDEX: 30.52 KG/M2 | TEMPERATURE: 98.7 F | HEIGHT: 68 IN | SYSTOLIC BLOOD PRESSURE: 132 MMHG | HEART RATE: 87 BPM | DIASTOLIC BLOOD PRESSURE: 84 MMHG | OXYGEN SATURATION: 98 %

## 2023-01-10 DIAGNOSIS — R73.9 HYPERGLYCEMIA: ICD-10-CM

## 2023-01-10 DIAGNOSIS — Z12.5 PROSTATE CANCER SCREENING: ICD-10-CM

## 2023-01-10 DIAGNOSIS — N52.9 ERECTILE DYSFUNCTION, UNSPECIFIED ERECTILE DYSFUNCTION TYPE: ICD-10-CM

## 2023-01-10 DIAGNOSIS — Z23 IMMUNIZATION DUE: ICD-10-CM

## 2023-01-10 DIAGNOSIS — Z12.11 COLON CANCER SCREENING: ICD-10-CM

## 2023-01-10 DIAGNOSIS — R09.89 RUNNY NOSE: ICD-10-CM

## 2023-01-10 DIAGNOSIS — Z00.01 ABNORMAL WELLNESS EXAM: ICD-10-CM

## 2023-01-10 DIAGNOSIS — F33.1 MODERATE EPISODE OF RECURRENT MAJOR DEPRESSIVE DISORDER (HCC): ICD-10-CM

## 2023-01-10 DIAGNOSIS — F17.210 TOBACCO DEPENDENCE DUE TO CIGARETTES: ICD-10-CM

## 2023-01-10 DIAGNOSIS — G47.33 OBSTRUCTIVE SLEEP APNEA SYNDROME: ICD-10-CM

## 2023-01-10 DIAGNOSIS — E78.2 MIXED HYPERLIPIDEMIA: Primary | ICD-10-CM

## 2023-01-10 RX ORDER — FLUTICASONE PROPIONATE 50 MCG
2 SPRAY, SUSPENSION (ML) NASAL DAILY
Qty: 16 G | Refills: 0 | Status: SHIPPED | OUTPATIENT
Start: 2023-01-10

## 2023-01-10 RX ORDER — SILDENAFIL 100 MG/1
100 TABLET, FILM COATED ORAL DAILY PRN
Qty: 30 TABLET | Refills: 2 | Status: SHIPPED | OUTPATIENT
Start: 2023-01-10

## 2023-01-10 NOTE — ASSESSMENT & PLAN NOTE
Component      Latest Ref Rng & Units 9/4/2020 9/14/2021 1/19/2022   Cholesterol      See Comment mg/dL 198 202 (H) 213 (H)   Triglycerides      See Comment mg/dL 101 93 162 (H)   HDL      >=40 mg/dL 43 40 32 (L)   LDL Calculated      0 - 100 mg/dL 135 (H) 143 (H) 149 (H)   Not well controlled  It was discussed about low-fat diet and regular exercise  We will continue to monitor fasting lipid profile

## 2023-01-10 NOTE — PROGRESS NOTES
Name: Matty Gonzalez      : 1965      MRN: [id number]  Encounter Provider: Eimly Caraballo MD  Encounter Date: 1/10/2023   Encounter department: [address] [place] [address]  Mixed hyperlipidemia  Assessment & Plan:  Component      Latest Ref Rng & Units 2020   Cholesterol      See Comment mg/dL 198 202 (H) 213 (H)   Triglycerides      See Comment mg/dL 101 93 162 (H)   HDL      >=40 mg/dL 43 40 32 (L)   LDL Calculated      0 - 100 mg/dL 135 (H) 143 (H) 149 (H)   Not well controlled  It was discussed about low-fat diet and regular exercise  We will continue to monitor fasting lipid profile  Orders:  -     CBC and differential; Future  -     Comprehensive metabolic panel; Future  -     Lipid Panel with Direct LDL reflex; Future  -     TSH, 3rd generation with Free T4 reflex; Future    2  Hyperglycemia  Assessment & Plan:  Not well controlled  It was discussed about low-carb diet and regular exercise  I am going to check his A1c  Orders:  -     Hemoglobin A1C; Future    3  Obstructive sleep apnea syndrome  Assessment & Plan:  Stable  Continue on CPAP machine  Continue to follow with sleep medicine  4  Runny nose  Assessment & Plan:  He was given a prescription for Flonase nasal spray  Orders:  -     fluticasone (FLONASE) 50 mcg/act nasal spray; 2 sprays into each nostril daily    5  Erectile dysfunction, unspecified erectile dysfunction type  Assessment & Plan:  He was given refill for Viagra  Orders:  -     sildenafil (VIAGRA) 100 mg tablet; Take 1 tablet (100 mg total) by mouth daily as needed for erectile dysfunction    6  Immunization due  -     influenza vaccine, quadrivalent, recombinant, PF, 0 5 mL, for patients 18 yr+ (FLUBLOK)    7  Moderate episode of recurrent major depressive disorder ([facility] [address] )  Assessment & Plan:  Stable    Continue Prozac 60 mg daily as per psychiatrist       8  Prostate cancer screening  -     PSA, Total Screen; Future    9  Colon cancer screening  -     Cologuard    10  Abnormal wellness exam  Assessment & Plan: It was discussed about immunizations, diet, exercise and safety measures  11  BMI 30 0-30 9,adult    12  Tobacco dependence due to cigarettes  Assessment & Plan:  Discussed about smoking cessation  Was discussed with patient about Wellbutrin as an option but he was told to discuss with his psychiatrist if he is okay with it I can start him on Wellbutrin 150 mg daily  BMI Counseling: Body mass index is 30 62 kg/m²  The BMI is above normal  Nutrition recommendations include decreasing portion sizes, encouraging healthy choices of fruits and vegetables and decreasing fast food intake  Rationale for BMI follow-up plan is due to patient being overweight or obese  Tobacco Cessation Counseling: The patient is sincerely urged to quit consumption of tobacco  He is ready to quit tobacco          Subjective     Is here today for follow-up multiple medical problems and for wellness exam   He continues to take his medications  He continues to follow-up with psychiatrist   He has been cutting down on smoking but he did not quit completely  Depression  Pertinent negatives include no abdominal pain, chest pain, chills, coughing, fever, headaches, rash or vomiting  Review of Systems   Constitutional: Negative for chills and fever  HENT: Positive for rhinorrhea  Negative for trouble swallowing  Eyes: Negative for visual disturbance  Respiratory: Negative for cough and shortness of breath  Cardiovascular: Negative for chest pain and palpitations  Gastrointestinal: Negative for abdominal pain, blood in stool and vomiting  Endocrine: Negative for cold intolerance and heat intolerance  Genitourinary: Negative for difficulty urinating and dysuria  Musculoskeletal: Negative for gait problem  Skin: Negative for rash  Neurological: Negative for dizziness, syncope and headaches  Hematological: Negative for adenopathy  Psychiatric/Behavioral: Positive for depression  Negative for behavioral problems  Past Medical History:   Diagnosis Date   • Anxiety    • CPAP (continuous positive airway pressure) dependence    • Depression    • Sleep apnea      Past Surgical History:   Procedure Laterality Date   • MULTIPLE TOOTH EXTRACTIONS     • MO PALATOPHARYNGOPLASTY N/A 4/7/2022    Procedure: UVULOPALATOPHARYNGOPLASTY (UPPP); Surgeon: Ese Salinas MD;  [facility];  Service: ENT   • TESTICLE SURGERY       Family History   Problem Relation Age of Onset   • Anxiety disorder Father         Previously on Valium   • Depression Father      Social History     Socioeconomic History   • Marital status: Single     Spouse name: None   • Number of children: None   • Years of education: None   • Highest education level: None   Occupational History   • None   Tobacco Use   • Smoking status: Every Day     Packs/day: 0 25     Types: Cigarettes   • Smokeless tobacco: Current   • Tobacco comments:     1 cigarette every few days   Vaping Use   • Vaping Use: Never used   Substance and Sexual Activity   • Alcohol use: Not Currently   • Drug use: No   • Sexual activity: Yes     Partners: Female   Other Topics Concern   • None   Social History Narrative   • None     Social Determinants of Health     Financial Resource Strain: Not on file   Food Insecurity: Not on file   Transportation Needs: Not on file   Physical Activity: Not on file   Stress: Not on file   Social Connections: Not on file   Intimate Partner Violence: Not on file   Housing Stability: Not on file     Current Outpatient Medications on File Prior to Visit   Medication Sig   • FLUoxetine (PROzac) 20 mg capsule Take 1 capsule (20 mg total) by mouth daily Take 1 capsule (20mg) daily in conjunction with 40mg capule for total of 60mg per day     • FLUoxetine (PROzac) 40 MG capsule Take 1 capsule (40 mg total) by mouth daily   • [DISCONTINUED] sildenafil (VIAGRA) 100 mg tablet Take 1 tablet (100 mg total) by mouth daily as needed for erectile dysfunction (Patient not taking: Reported on 1/10/2023)     No Known Allergies  Immunization History   Administered Date(s) Administered   • COVID-19 MODERNA VACC 0 5 ML IM 04/05/2021   • Influenza, recombinant, quadrivalent,injectable, preservative free 10/19/2020, 01/10/2023       Objective     /84 (BP Location: Left arm, Patient Position: Sitting, Cuff Size: Large)   Pulse 87   Temp 98 7 °F (37 1 °C) (Tympanic)   Ht 5' 8" (1 727 m)   Wt 91 4 kg (201 lb 6 4 oz)   SpO2 98%   BMI 30 62 kg/m²     Physical Exam  Vitals and nursing note reviewed  Constitutional:       Appearance: He is well-developed  HENT:      Head: Normocephalic and atraumatic  Eyes:      Pupils: Pupils are equal, round, and reactive to light  Cardiovascular:      Rate and Rhythm: Normal rate and regular rhythm  Heart sounds: Normal heart sounds  Pulmonary:      Effort: Pulmonary effort is normal       Breath sounds: Normal breath sounds  Abdominal:      General: Bowel sounds are normal       Palpations: Abdomen is soft  Musculoskeletal:         General: Normal range of motion  Cervical back: Normal range of motion and neck supple  Lymphadenopathy:      Cervical: No cervical adenopathy  Skin:     General: Skin is warm  Findings: No rash  Neurological:      Mental Status: He is alert and oriented to person, place, and time  Cranial Nerves: No cranial nerve deficit  Lobito Pino MD BMI Counseling: Body mass index is 30 62 kg/m²  The BMI is above normal  Nutrition recommendations include reducing portion sizes, decreasing overall calorie intake and 3-5 servings of fruits/vegetables daily  Exercise recommendations include moderate aerobic physical activity for 150 minutes/week

## 2023-01-10 NOTE — ASSESSMENT & PLAN NOTE
Not well controlled  It was discussed about low-carb diet and regular exercise  I am going to check his A1c

## 2023-01-10 NOTE — PROGRESS NOTES
Name: Felipe Casper      : 1965      MRN: [id number]  Encounter Provider: Stephen Apple MD  Encounter Date: 1/10/2023   Encounter department: [address] [place] [address]  Mixed hyperlipidemia  Assessment & Plan:  Component      Latest Ref Rng & Units 2020   Cholesterol      See Comment mg/dL 198 202 (H) 213 (H)   Triglycerides      See Comment mg/dL 101 93 162 (H)   HDL      >=40 mg/dL 43 40 32 (L)   LDL Calculated      0 - 100 mg/dL 135 (H) 143 (H) 149 (H)   Not well controlled  It was discussed about low-fat diet and regular exercise  We will continue to monitor fasting lipid profile  Orders:  -     CBC and differential; Future  -     Comprehensive metabolic panel; Future  -     Lipid Panel with Direct LDL reflex; Future  -     TSH, 3rd generation with Free T4 reflex; Future    2  Hyperglycemia  Assessment & Plan:  Not well controlled  It was discussed about low-carb diet and regular exercise  I am going to check his A1c  Orders:  -     Hemoglobin A1C; Future    3  Obstructive sleep apnea syndrome  Assessment & Plan:  Stable  Continue on CPAP machine  Continue to follow with sleep medicine  4  Runny nose  Assessment & Plan:  He was given a prescription for Flonase nasal spray  Orders:  -     fluticasone (FLONASE) 50 mcg/act nasal spray; 2 sprays into each nostril daily    5  Erectile dysfunction, unspecified erectile dysfunction type  Assessment & Plan:  He was given refill for Viagra  Orders:  -     sildenafil (VIAGRA) 100 mg tablet; Take 1 tablet (100 mg total) by mouth daily as needed for erectile dysfunction    6  Immunization due  -     influenza vaccine, quadrivalent, recombinant, PF, 0 5 mL, for patients 18 yr+ (FLUBLOK)    7  Moderate episode of recurrent major depressive disorder ([facility] [address] )  Assessment & Plan:  Stable    Continue Prozac 60 mg daily as per psychiatrist       8  Prostate cancer screening  -     PSA, Total Screen; Future    9  Colon cancer screening  -     Cologuard    10  Abnormal wellness exam  Assessment & Plan: It was discussed about immunizations, diet, exercise and safety measures  11  BMI 30 0-30 9,adult    12  Tobacco dependence due to cigarettes  Assessment & Plan:  Discussed about smoking cessation  Was discussed with patient about Wellbutrin as an option but he was told to discuss with his psychiatrist if he is okay with it I can start him on Wellbutrin 150 mg daily  Subjective     Is here today for follow-up multiple medical problems and for wellness exam   He continues to take his medications  He continues to follow-up with psychiatrist   He has been cutting down on smoking but he did not quit completely  Review of Systems   Constitutional: Negative for chills and fever  HENT: Positive for rhinorrhea  Negative for trouble swallowing  Eyes: Negative for visual disturbance  Respiratory: Negative for cough and shortness of breath  Cardiovascular: Negative for chest pain and palpitations  Gastrointestinal: Negative for abdominal pain, blood in stool and vomiting  Endocrine: Negative for cold intolerance and heat intolerance  Genitourinary: Negative for difficulty urinating and dysuria  Musculoskeletal: Negative for gait problem  Skin: Negative for rash  Neurological: Negative for dizziness, syncope and headaches  Hematological: Negative for adenopathy  Psychiatric/Behavioral: Negative for behavioral problems  Past Medical History:   Diagnosis Date   • Anxiety    • CPAP (continuous positive airway pressure) dependence    • Depression    • Sleep apnea      Past Surgical History:   Procedure Laterality Date   • MULTIPLE TOOTH EXTRACTIONS     • IL PALATOPHARYNGOPLASTY N/A 4/7/2022    Procedure: UVULOPALATOPHARYNGOPLASTY (UPPP);   Surgeon: Adalgisa Strickland MD;  Location: [facility];  Service: ENT   • TESTICLE SURGERY       Family History   Problem Relation Age of Onset   • Anxiety disorder Father         Previously on Valium   • Depression Father      Social History     Socioeconomic History   • Marital status: Single     Spouse name: None   • Number of children: None   • Years of education: None   • Highest education level: None   Occupational History   • None   Tobacco Use   • Smoking status: Every Day     Packs/day: 0 25     Types: Cigarettes   • Smokeless tobacco: Current   • Tobacco comments:     1 cigarette every few days   Vaping Use   • Vaping Use: Never used   Substance and Sexual Activity   • Alcohol use: Not Currently   • Drug use: No   • Sexual activity: Yes     Partners: Female   Other Topics Concern   • None   Social History Narrative   • None     Social Determinants of Health     Financial Resource Strain: Not on file   Food Insecurity: Not on file   Transportation Needs: Not on file   Physical Activity: Not on file   Stress: Not on file   Social Connections: Not on file   Intimate Partner Violence: Not on file   Housing Stability: Not on file     Current Outpatient Medications on File Prior to Visit   Medication Sig   • FLUoxetine (PROzac) 20 mg capsule Take 1 capsule (20 mg total) by mouth daily Take 1 capsule (20mg) daily in conjunction with 40mg capule for total of 60mg per day     • FLUoxetine (PROzac) 40 MG capsule Take 1 capsule (40 mg total) by mouth daily   • [DISCONTINUED] sildenafil (VIAGRA) 100 mg tablet Take 1 tablet (100 mg total) by mouth daily as needed for erectile dysfunction (Patient not taking: Reported on 1/10/2023)     No Known Allergies  Immunization History   Administered Date(s) Administered   • COVID-19 MODERNA VACC 0 5 ML IM 04/05/2021   • Influenza, recombinant, quadrivalent,injectable, preservative free 10/19/2020, 01/10/2023       Objective     /84 (BP Location: Left arm, Patient Position: Sitting, Cuff Size: Large)   Pulse 87   Temp 98 7 °F (37 1 °C) (Tympanic)   Ht 5' 8" (1 727 m)   Wt 91 4 kg (201 lb 6 4 oz)   SpO2 98%   BMI 30 62 kg/m²     Physical Exam  Vitals and nursing note reviewed  Constitutional:       Appearance: He is well-developed  HENT:      Head: Normocephalic and atraumatic  Eyes:      Pupils: Pupils are equal, round, and reactive to light  Cardiovascular:      Rate and Rhythm: Normal rate and regular rhythm  Heart sounds: Normal heart sounds  Pulmonary:      Effort: Pulmonary effort is normal       Breath sounds: Normal breath sounds  Abdominal:      General: Bowel sounds are normal       Palpations: Abdomen is soft  Musculoskeletal:         General: Normal range of motion  Cervical back: Normal range of motion and neck supple  Lymphadenopathy:      Cervical: No cervical adenopathy  Skin:     General: Skin is warm  Findings: No rash  Neurological:      Mental Status: He is alert and oriented to person, place, and time  Cranial Nerves: No cranial nerve deficit         Karen Sawyer MD

## 2023-01-10 NOTE — ASSESSMENT & PLAN NOTE
Discussed about smoking cessation  Was discussed with patient about Wellbutrin as an option but he was told to discuss with his psychiatrist if he is okay with it I can start him on Wellbutrin 150 mg daily

## 2023-01-13 DIAGNOSIS — N52.9 ERECTILE DYSFUNCTION, UNSPECIFIED ERECTILE DYSFUNCTION TYPE: ICD-10-CM

## 2023-01-13 RX ORDER — SILDENAFIL 100 MG/1
100 TABLET, FILM COATED ORAL DAILY PRN
Qty: 30 TABLET | Refills: 0 | Status: CANCELLED | OUTPATIENT
Start: 2023-01-13

## 2023-02-03 ENCOUNTER — APPOINTMENT (OUTPATIENT)
Dept: LAB | Facility: CLINIC | Age: 58
End: 2023-02-03

## 2023-02-03 DIAGNOSIS — Z12.5 PROSTATE CANCER SCREENING: ICD-10-CM

## 2023-02-03 DIAGNOSIS — E78.2 MIXED HYPERLIPIDEMIA: ICD-10-CM

## 2023-02-03 DIAGNOSIS — R73.9 HYPERGLYCEMIA: ICD-10-CM

## 2023-02-03 LAB
ALBUMIN SERPL BCP-MCNC: 3.8 G/DL (ref 3.5–5)
ALP SERPL-CCNC: 89 U/L (ref 46–116)
ALT SERPL W P-5'-P-CCNC: 31 U/L (ref 12–78)
ANION GAP SERPL CALCULATED.3IONS-SCNC: 5 MMOL/L (ref 4–13)
AST SERPL W P-5'-P-CCNC: 13 U/L (ref 5–45)
BASOPHILS # BLD AUTO: 0.03 THOUSANDS/ÂΜL (ref 0–0.1)
BASOPHILS NFR BLD AUTO: 0 % (ref 0–1)
BILIRUB SERPL-MCNC: 0.52 MG/DL (ref 0.2–1)
BUN SERPL-MCNC: 11 MG/DL (ref 5–25)
CALCIUM SERPL-MCNC: 9.1 MG/DL (ref 8.3–10.1)
CHLORIDE SERPL-SCNC: 108 MMOL/L (ref 96–108)
CHOLEST SERPL-MCNC: 270 MG/DL
CO2 SERPL-SCNC: 27 MMOL/L (ref 21–32)
CREAT SERPL-MCNC: 1.08 MG/DL (ref 0.6–1.3)
EOSINOPHIL # BLD AUTO: 0 THOUSAND/ÂΜL (ref 0–0.61)
EOSINOPHIL NFR BLD AUTO: 0 % (ref 0–6)
ERYTHROCYTE [DISTWIDTH] IN BLOOD BY AUTOMATED COUNT: 13 % (ref 11.6–15.1)
EST. AVERAGE GLUCOSE BLD GHB EST-MCNC: 131 MG/DL
GFR SERPL CREATININE-BSD FRML MDRD: 75 ML/MIN/1.73SQ M
GLUCOSE P FAST SERPL-MCNC: 108 MG/DL (ref 65–99)
HBA1C MFR BLD: 6.2 %
HCT VFR BLD AUTO: 50.7 % (ref 36.5–49.3)
HDLC SERPL-MCNC: 33 MG/DL
HGB BLD-MCNC: 16.8 G/DL (ref 12–17)
IMM GRANULOCYTES # BLD AUTO: 0.05 THOUSAND/UL (ref 0–0.2)
IMM GRANULOCYTES NFR BLD AUTO: 1 % (ref 0–2)
LDLC SERPL CALC-MCNC: 190 MG/DL (ref 0–100)
LYMPHOCYTES # BLD AUTO: 2.46 THOUSANDS/ÂΜL (ref 0.6–4.47)
LYMPHOCYTES NFR BLD AUTO: 28 % (ref 14–44)
MCH RBC QN AUTO: 30.9 PG (ref 26.8–34.3)
MCHC RBC AUTO-ENTMCNC: 33.1 G/DL (ref 31.4–37.4)
MCV RBC AUTO: 93 FL (ref 82–98)
MONOCYTES # BLD AUTO: 0.56 THOUSAND/ÂΜL (ref 0.17–1.22)
MONOCYTES NFR BLD AUTO: 6 % (ref 4–12)
NEUTROPHILS # BLD AUTO: 5.7 THOUSANDS/ÂΜL (ref 1.85–7.62)
NEUTS SEG NFR BLD AUTO: 65 % (ref 43–75)
NRBC BLD AUTO-RTO: 0 /100 WBCS
PLATELET # BLD AUTO: 405 THOUSANDS/UL (ref 149–390)
PMV BLD AUTO: 8.9 FL (ref 8.9–12.7)
POTASSIUM SERPL-SCNC: 4.6 MMOL/L (ref 3.5–5.3)
PROT SERPL-MCNC: 7 G/DL (ref 6.4–8.4)
PSA SERPL-MCNC: 1.3 NG/ML (ref 0–4)
RBC # BLD AUTO: 5.43 MILLION/UL (ref 3.88–5.62)
SODIUM SERPL-SCNC: 140 MMOL/L (ref 135–147)
T4 FREE SERPL-MCNC: 0.91 NG/DL (ref 0.76–1.46)
TRIGL SERPL-MCNC: 234 MG/DL
TSH SERPL DL<=0.05 MIU/L-ACNC: 0.4 UIU/ML (ref 0.45–4.5)
WBC # BLD AUTO: 8.8 THOUSAND/UL (ref 4.31–10.16)

## 2023-02-21 ENCOUNTER — HOSPITAL ENCOUNTER (OUTPATIENT)
Dept: SLEEP CENTER | Facility: CLINIC | Age: 58
Discharge: HOME | End: 2023-02-21

## 2023-02-21 DIAGNOSIS — G47.33 OBSTRUCTIVE SLEEP APNEA: ICD-10-CM

## 2023-02-21 DIAGNOSIS — G47.33 OSA (OBSTRUCTIVE SLEEP APNEA): ICD-10-CM

## 2023-02-21 NOTE — PROGRESS NOTES
Sleep Study Documentation    Pre-Sleep Study       Sleep testing procedure explained to patient:YES    Patient napped prior to study:YES- more than 30 minutes  Napped after 2PM: no    Caffeine:Nightshift worker after midnight  Caffeine use:YES- coffee  6 to 18 ounces and soda  12 to 26 ounces    Alcohol:Nightshift workers after 7AM: Alcohol use:NO    Typical day for patient:YES       Study Documentation    Sleep Study Indications: Patient has previous Dx CECILIA  H&P states snoring, excessive daytime sleepiness, nocturnal choking, witnessed apneas, witnessed gasping, and COPD  Sleep Study: Diagnostic   Snore:Severe  Supplemental O2: no    O2 flow rate (L/min) range NA  O2 flow rate (L/min) final NA  Minimum SaO2 80 0%  Baseline SaO2 94 0%    EKG abnormalities: no     EEG abnormalities: no    Sleep Study Recorded < 2 hours: N/A    Sleep Study Recorded > 2 hours but incomplete study: N/A    Sleep Study Recorded 6 hours but no sleep obtained: NO    Patient classification: employed       Post-Sleep Study    Medication used at bedtime or during sleep study:YES other prescription medications    Patient reports time it took to fall asleep:30 to 60 minutes    Patient reports waking up during study:3 or more times  Patient reports returning to sleep in 10 to 30 minutes  Patient reports sleeping 4 to 6 hours with dreaming  Patient reports sleep during study:typical    Patient rated sleepiness: Not sleepy or tired    PAP treatment:no

## 2023-02-26 NOTE — PROGRESS NOTES
Diagnostic Report  Patient Name: Shahid Noble Patient Details: Male, 61 years    Patient #: [id number] : 1965   Referring Physician: Dr. Micaela Vyas Height: 68.0 in   Interpreting Physician: Dr. John Trejo Weight: 201.0 lbs   Study Date: 2023 B. M.I.: 30.6     STUDY FORMAT:  The patient was admitted to the sleep laboratory at the [facility] and oriented to procedures and equipment. Data was obtained using the [employer] sleep monitoring system; Parameters monitored: EEG (frontal, central, occipital), EOG, EMG (chin and leg), ECG, body position, abdominal and thoracic respiratory effort, snoring, pulse oximetry, and airflow. The sleep study was scored following the rules established by the American Academy of Sleep Medicine (AASM). Hypopnea: event lasting ? 10 seconds with a ?30% reduction in airflow amplitude and a ?4% decrease in SpO2. HISTORY:  The patient has a history of severe obstructive sleep apnea, with AHI= 60.0. He has been using BPAP Auto. SLEEP:  Patient slept for 270.5 minutes out of 341.5 minutes in bed representing a sleep efficiency of 79.2%. Sleep architecture showed a sleep latency of 16.0 minutes and a REM sleep latency of 106.5 minutes. There was 8.7% Stage N1 noted. There was 74.7% Stage N2 noted. There was 0.0% Stage N3 noted. There was 16.6% REM sleep noted. The patient had 239 arousals for an average of 53.0 arousals per hour of sleep. RESPIRATORY:  There were a total of 251 respiratory events made up of 190 obstructive apneas, 1 mixed apneas, 0 central apneas, and 60 hypopneas resulting in an apnea/hypopnea index (AHI) of 55.7 events per hour of sleep. The AHI in the supine position was 61.0. The AHI during REM sleep was 36.0. OXIMETRY:  The baseline oxygen saturation with the patient awake was 94.0%. The mean oxygen saturation throughout the study was 93.7%.   The amount of sleep time below 90% was 21.0 minutes. The lowest oxygen saturation was 80.0%. BODY POSITION:  The patient slept 93.2% of the evening in the supine position, 6.8% on left side, 0.0% on right side, and 0.0%  in the prone position. LEG MOVEMENT:  There were 1 PLMs; PLM index of 0.2; 0 PLMs associated with arousal; PLM w/arousal index of 0.0. SUMMARY:         TOTAL INDEX   Apneas and Hypopneas 251 55.7   Central Apneas 0 0.0   Arousals 239 53.0   PLMs 1 0.2     IMPRESSION:   Polysomnography demonstrates severe obstructive sleep apnea. There is no significant change from previous diagnostic testing. Because of the patient’s intolerance to positive airway pressure therapy, consider alternative treatment.       Board Certified Sleep Physician

## 2023-03-02 ENCOUNTER — TELEPHONE (OUTPATIENT)
Dept: SLEEP CENTER | Facility: CLINIC | Age: 58
End: 2023-03-02

## 2023-03-06 ENCOUNTER — TELEPHONE (OUTPATIENT)
Dept: SLEEP CENTER | Facility: CLINIC | Age: 58
End: 2023-03-06

## 2023-03-06 NOTE — TELEPHONE ENCOUNTER
Sleep study shows severe CECILIA     Per report from dr Yesica Cavanaugh   There is no significant change from previous diagnostic testing  Because of the patient’s intolerance to positive airway pressure therapy, consider alternative treatment        Left call back message patient is scheduled with ENT

## 2023-06-06 ENCOUNTER — OFFICE VISIT (OUTPATIENT)
Dept: PSYCHIATRY | Facility: CLINIC | Age: 58
End: 2023-06-06

## 2023-06-06 DIAGNOSIS — R45.86 MOOD SWINGS: ICD-10-CM

## 2023-06-06 DIAGNOSIS — F33.1 MODERATE EPISODE OF RECURRENT MAJOR DEPRESSIVE DISORDER (HCC): Primary | ICD-10-CM

## 2023-06-06 RX ORDER — ARIPIPRAZOLE 5 MG/1
5 TABLET ORAL DAILY
Qty: 90 TABLET | Refills: 1 | Status: SHIPPED | OUTPATIENT
Start: 2023-06-06

## 2023-06-06 NOTE — PSYCH
"MEDICATION MANAGEMENT NOTE        [facility]      Name and Date of Birth:  Mike Rendon 60 y o  1965 MRN: [id number]    Date of Visit: 2023    Reason for Visit: Follow-up visit for medication management       SUBJECTIVE:    Mike Rendon is a 60 y o  male with past psychiatric history significant for major depressive disorder, complicated bereheavement, and nicotine use disorder who was personally seen and evaluated today at the [address] [place] [address] E outpatient clinic for follow-up and medication management  Julito Monae presents as pleasant and cooperative  His thoughts are linear and organized  He completes assessment without difficulty  [place] endorses compliance with psychotropic medication regimen consisting of Prozac 60mg Daily  He denies adverse medication side effects  Julito Monae reports a challenging 4+ months since last visit  His girlfriend continues to antagonize and devalue him  She utilizes gaslighting as a tool for manipulation  She recently spoke about his  son and used him as a means of belitting [place]'s parenting capabilities  Extensive supportive therapy and cognitive reframing utilized  We discussed pros/cons related to staying within the relationship and ways toxic behaviors permeate every aspect of one's life  I also discussed the benefits of individual therapy as this would provide an outlet for [place] to decompress  He was given an informational packet last visit with therapists in the community but \"lost it all\"  I provided the same packet today  Acutely, Dallins sleep remains fragmented and non-restorative secondary to unmanaged CECILIA  He is in the process of engaging with sleep medicine regarding avenues for intervention  He discusses a new treatment modality (Inspire) and ways he hopes this could be effective, suggestive of self preservation  C[place]'s appetite is stable   He reports low mood secondary to " "the toxicity of his home environment  He is amotivated and reports periods of low energy as a result of his sleeping patterns and GF's malignant behaviors  He denies SI/HI  C[place]'s focus and concentration are mostly stable  He denies pathologic anhedonia or crying spells  His anxiety and worry is only pathologic at home  He is tense, on-edge, and reactive secondary to his GF's behavior  He reports uptick in mood lability, \"anger\", and poorer frustration tolerance as a result  No acute panic symptoms  During today's examination, Minor Link does not exhibit objective evidence of misael/hypomania or psychosis  Minor Link is not currently pressured in speech, grandiose, labile, or pathologically impulsive  Minor Link is without perceptual disturbances, such as A/V hallucinations, paranoia, ideas of reference, or delusional beliefs  Minor Link denies recent ETOH or illicit substance abuse  He continues to use nicotine products and does not voice interest in stopping  He offers no further concerns  Current Rating Scores:     None completed today      Review Of Systems:      Constitutional feeling tired, low energy and as noted in HPI   ENT negative   Cardiovascular negative   Respiratory negative   Gastrointestinal negative   Genitourinary negative   Musculoskeletal joint pain   Integumentary negative   Neurological negative   Endocrine negative   Other Symptoms none, all other systems are negative          Past Psychiatric History: (unchanged information from previous note copied and italicized) - Information that is bolded has been updated       Inpatient psychiatric admissions: Denies  Prior outpatient psychiatric linkage: Denies - received Sertraline from PCP  Past/current psychotherapy: Denies  History of suicidal attempts/gestures: Denies  History of violence/aggressive behaviors: was court-mandated to [employer]" classes - completed the 6 week program in July 2022  Psychotropic medication trials: Sertraline 100mg " Daily (experiencing ED), Wellbutrin (over activated, more anxious), Trintellix, Prozac (now), Abilify (now)  Substance abuse inpatient/outpatient rehabilitation: Denies     Substance Abuse History: (unchanged information from previous note copied and italicized) - Information that is bolded has been updated       No history of ETOH or illict substance  He does endorse a use of tobacco abuse (previous 2pack/day smoker)  No past legal actions or arrests secondary to substance intoxication  The patient denies prior DWIs/DUIs  No history of outpatient/inpatient rehabilitation programs  C[place] does not exhibit objective evidence of substance withdrawal during today's examination nor does [place] appear under the influence of any psychoactive substance           Social History: (unchanged information from previous note copied and italicized) - Information that is bolded has been updated       Developmental: Denies a history of milestone/developmental delay  Denies a history of in-utero exposure to toxins/illicit substances  There is no documented history of IEP or need for special education  Education: high school diploma/GED - graduated from [employer]  Marital history:  - He had 2 sons with his ex-wife  He is now involved in a new relationship    Living arrangement, social support: significant other   He is still in contact with his younger son (23 years old) who he sees weekly    Occupational History: Employed as a  - works 5 days per week (2 days, 2 nights, and 1 midday shift)  Access to firearms: Denies direct access to weapons/firearms  [employer] no history of arrests or violence with a deadly weapon          Traumatic History: (unchanged information from previous note copied and italicized) - Information that is bolded has been updated    Jorge Landrum is reported  Other Traumatic Events: Tragically lost his son to a MVA and his mother to cancer in 2018     Past Medical History:    Past Medical History:   Diagnosis Date   • Anxiety    • CPAP (continuous positive airway pressure) dependence    • Depression    • Sleep apnea         Past Surgical History:   Procedure Laterality Date   • MULTIPLE TOOTH EXTRACTIONS     • [place] PALATOPHARYNGOPLASTY N/A 4/7/2022    Procedure: UVULOPALATOPHARYNGOPLASTY (UPPP); Surgeon: Arthuro Mcburney, MD;  Location: [facility];  Service: ENT   • TESTICLE SURGERY       No Known Allergies    Substance Abuse History:    Social History     Substance and Sexual Activity   Alcohol Use Not Currently     Social History     Substance and Sexual Activity   Drug Use No       Social History:    Social History     Socioeconomic History   • Marital status: Single     Spouse name: Not on file   • Number of children: Not on file   • Years of education: Not on file   • Highest education level: Not on file   Occupational History   • Not on file   Tobacco Use   • Smoking status: Every Day     Packs/day: 0 25     Types: Cigarettes   • Smokeless tobacco: Current   • Tobacco comments:     1 cigarette every few days   Vaping Use   • Vaping Use: Never used   Substance and Sexual Activity   • Alcohol use: Not Currently   • Drug use: No   • Sexual activity: Yes     Partners: Female   Other Topics Concern   • Not on file   Social History Narrative   • Not on file     Social Determinants of Health     Financial Resource Strain: Not on file   Food Insecurity: Not on file   Transportation Needs: Not on file   Physical Activity: Not on file   Stress: Not on file   Social Connections: Not on file   Intimate Partner Violence: Not on file   Housing Stability: Not on file       Family Psychiatric History:     Family History   Problem Relation Age of Onset   • Anxiety disorder Father         Previously on Valium   • Depression Father        History Review:  The following portions of the patient's history were reviewed and updated as appropriate: allergies, current medications, past family history, past medical history, past social history, past surgical history and problem list          OBJECTIVE:     Vital signs in last 24 hours: There were no vitals filed for this visit  Mental Status Evaluation:    Appearance age appropriate, casually dressed, dressed appropriately, looks stated age   Behavior pleasant, cooperative   Speech normal rate, normal volume, normal pitch   Mood dysphoric   Affect constricted   Thought Processes organized, logical, goal directed   Associations intact associations   Thought Content no overt delusions   Perceptual Disturbances: no auditory hallucinations, no visual hallucinations   Abnormal Thoughts  Risk Potential Suicidal ideation - None at present  Homicidal ideation - None at present  Potential for aggression - No   Orientation oriented to person, place, time/date and situation   Memory recent and remote memory grossly intact   Consciousness alert and awake   Attention Span Concentration Span attention span and concentration are age appropriate   Intellect appears to be of average intelligence   Insight intact and good   Judgement intact and good   Muscle Strength and  Gait normal gait and normal balance   Motor activity no abnormal movements   Language no difficulty naming common objects   Fund of Knowledge adequate knowledge of current events   Pain none   Pain Scale Did not ask patient to formally rate       Laboratory Results: I have personally reviewed all pertinent laboratory/tests results    Recent Labs (last 2 months):   No visits with results within 2 Month(s) from this visit     Latest known visit with results is:   Appointment on 02/03/2023   Component Date Value   • WBC 02/03/2023 8 80    • RBC 02/03/2023 5 43    • Hemoglobin 02/03/2023 16 8    • Hematocrit 02/03/2023 50 7 (H)    • MCV 02/03/2023 93    • MCH 02/03/2023 30 9    • MCHC 02/03/2023 33 1    • RDW 02/03/2023 13 0    • MPV 02/03/2023 8 9    • Platelets 07/16/6659 405 (H)    • nRBC 02/03/2023 0 • Neutrophils Relative 02/03/2023 65    • Immat GRANS % 02/03/2023 1    • Lymphocytes Relative 02/03/2023 28    • Monocytes Relative 02/03/2023 6    • Eosinophils Relative 02/03/2023 0    • Basophils Relative 02/03/2023 0    • Neutrophils Absolute 02/03/2023 5 70    • Immature Grans Absolute 02/03/2023 0 05    • Lymphocytes Absolute 02/03/2023 2 46    • Monocytes Absolute 02/03/2023 0 56    • Eosinophils Absolute 02/03/2023 0 00    • Basophils Absolute 02/03/2023 0 03    • Sodium 02/03/2023 140    • Potassium 02/03/2023 4 6    • Chloride 02/03/2023 108    • CO2 02/03/2023 27    • ANION GAP 02/03/2023 5    • BUN 02/03/2023 11    • Creatinine 02/03/2023 1 08    • Glucose, Fasting 02/03/2023 108 (H)    • Calcium 02/03/2023 9 1    • AST 02/03/2023 13    • ALT 02/03/2023 31    • Alkaline Phosphatase 02/03/2023 89    • Total Protein 02/03/2023 7 0    • Albumin 02/03/2023 3 8    • Total Bilirubin 02/03/2023 0 52    • eGFR 02/03/2023 75    • Cholesterol 02/03/2023 270 (H)    • Triglycerides 02/03/2023 234 (H)    • HDL, Direct 02/03/2023 33 (L)    • LDL Calculated 02/03/2023 190 (H)    • TSH 3RD GENERATON 02/03/2023 0 398 (L)    • PSA 02/03/2023 1 3    • Hemoglobin A1C 02/03/2023 6 2 (H)    • EAG 02/03/2023 131    • Free T4 02/03/2023 0 91        Suicide/Homicide Risk Assessment:    The following interventions are recommended: contracts for safety at present - agrees to go to ED if feeling unsafe      Lethality Statement:    Based on today's assessment and clinical criteria, Oneta Filter contracts for safety and is not an imminent risk of harm to self or others  Outpatient level of care is deemed appropriate at this current time  Maggy Delacruz understands that if they can no longer contract for safety, they need to call the office or report to their nearest Emergency Room for immediate evaluation  Assessment/Plan:     Sparkle Still a 58 y  o  male with past psychiatric history significant for major depressive disorder, "complicated bereheavement, and nicotine use disorder who was personally seen and evaluated today at the [address] [place] [address] E outpatient clinic for follow-up and medication management  [place] endorses a neurotypical history without mood symptomatology or anxiety prior to 2 years ago  He denies prior psychiatric treatment or counseling before 2018 and no previous medication trials  Approximately two years ago, Cleveland's oldest son was killed in a MVA after hydroplaning during a wet evening  Approximately 3-6 months after his death, Cleveland's mother succumbed to a terminal illness (cancer)  These two significant losses were the catalyst for new-onset depressive symptomatology  Over the past 1 5-2 years, Delma Ellis admits to episodic sadness and intermittent feelings of loss  These symptoms intensified since March/April 2020, likely secondary to COVID-19, mandatory quearentine, and subsequent disconnect from others  In June 2020, during an office visit with his PCP, Delma Ellis endorsed worsening depression and anxiety and was started on Sertraline which was titrated to 100mg Daily  He endorsed improvement in mood and resolution in daily anxiety, however, he was experiencing sexual dysfunction (erectile dysfunction) which was problematic  As such, he was tapered of Zoloft and Wellbutrin was started which was over-stimulating and induced anxiety  This agent was subsequently discontinued and Delma Ellis was started on [employer], [employer] did not cover  Today, he remains on Prozac and is tolerating it well      Today's Plan:     Psychopharmacologically, [place] reports tolerability and benefit from Prozac  He denies interest in further optimization of this medication  We did discuss augmentation strategies, particularly with Abilify, given it's FDA approval for MDD  Abilify would also likely assist with mood lability, subjective \"anger\", and feeling on-edge   Risks/benefits/alternativies to treatment " "discussed, including a myriad of potential adverse medication side effects, to which [place] voiced understanding and consented fully to treatment   Rachel Watkins was once again referred for individual therapy and anger management classes via [facility]       DSM-V Diagnoses:      1 ) Major Depressive Episode, recurrent, with anxious distress  2 ) Nicotine Use Disorder        Treatment Recommendations/Precautions:     1 ) Major Depressive Episode, recurrent, with anxious distress  - Hx of medication trials including: Sertraline (tolerated well but experience sexual dysfunction), Wellbutrin (too activating)  - Continue Prozac 60mg Daily  - Start Abilify 5mg Daily  - Discussed referral for psychotherapy - agreeable today, given packet given extensive wait list at [facility]      2 ) Nicotine Use Disorder  - States that he previously smoked 2 packs per day - he is now smoking less frequently - states today that he can go \"4 days without a cigarette\"  - Completed Rx Chantix by PCP- in January 2022  - Completed hypnosis   - Offered trial of Wellbutrin (this time with SR version rather than XL version) which would be appropriate and likely beneficial for mood, smoking, and struggles with ED, which are not entirely related to SSRI use  - Psychoeducation provided regarding the importance of exercise and healthy dietary choices and their impact on mood, energy, and motivation  - Counseled to avoid ETOH, illict substances, and nicotine secondary to the detrimental effects of these substances on mental and physical health       Medication management every 3 months  Referral for individual psychotherapy  Aware of need to follow up with family physician for medical issues  Aware of 24 hour and weekend coverage for urgent situations accessed by calling [facility] main practice number    Medications Risks/Benefits      Risks, Benefits And Possible Side Effects Of Medications:    Risks, benefits, and possible side " effects of medications explained to [place] including risk of parkinsonian symptoms, Tardive Dyskinesia and metabolic syndrome related to treatment with antipsychotic medications, risk of cardiovascular events in elderly related to treatment with antipsychotic medications and risk of suicidality and serotonin syndrome related to treatment with antidepressants  He verbalizes understanding and agreement for treatment  Controlled Medication Discussion:     Not applicable    Psychotherapy Provided:     Individual psychotherapy provided: Yes  Counseling was provided during the session today for 17 minutes  Medications, treatment progress and treatment plan reviewed with [place]  Medication changes discussed with [place]  Medication education provided to Huber Klein  Recent stressors discussed with [place] including family problems, family conflict, family issues, relationship problems, medical illness in family, job stress, health issues, medical problems, limited support, everyday stressors and occasional anxiety  Coping strategies reviewed with [place]  Educated on importance of medication and treatment compliance  Importance of follow up with family physician for medical issues reviewed with Huber Klein  Supportive therapy provided  Cognitive therapy was utilized during the session  Treatment Plan:    Completed and signed during the session: Yes - with [place]      Visit Time    Visit Start Time: 2:00 PM  Visit Stop Time: 2:30 PM  Total Visit Duration: 30 minutes     The total visit duration detailed above includes: patient engagement, medication management, psychotherapy/counseling, discussion regarding treatment goals, and coordination of care  Note Share Disclaimer:      This note was not shared with the patient due to reasonable likelihood of causing patient harm      Fadi Brown MD  Board Certified Diplomate of the [address] [place] [address] Rd , [address] of Psychiatry and Neurology  06/06/23

## 2023-06-06 NOTE — BH TREATMENT PLAN
TREATMENT PLAN (Medication Management Only)        [facility]    Name and Date of Birth:  Gato Naik 60 y o  1965  Date of Treatment Plan: June 6, 2023  Diagnosis/Diagnoses:    1  Moderate episode of recurrent major depressive disorder ([address] )    2  Mood swings      Strengths/Personal Resources for Self-Care: supportive family, supportive friends, taking medications as prescribed, ability to adapt to life changes, ability to communicate needs, ability to communicate well, motivation for treatment, ability to negotiate basic needs, being resoureceful, self-reliance  Area/Areas of need (in own words): mood instability  1  Long Term Goal: improve control of mood  Target Date:6 months - 12/6/2023  Person/Persons responsible for completion of goal: [place]  2  Short Term Objective (s) - How will we reach this goal?:   A  Provider new recommended medication/dosage changes and/or continue medication(s): continue current medications as prescribed  B  Attend medication management appointments regularly  C  Take psychiatric medications responsibly  D  Start psychotherapy  E  Improve relationship with GF  Target Date:6 months - 12/6/2023  Person/Persons Responsible for Completion of Goal: [place]  Progress Towards Goals: continuing treatment  Treatment Modality: medication management every 3 months  Review due 180 days from date of this plan: 6 months - 12/6/2023  Expected length of service: ongoing treatment  My Physician/PA/NP and I have developed this plan together and I agree to work on the goals and objectives  I understand the treatment goals that were developed for my treatment  Treatment Plan completed with assistance and input from patient and verbal consent provided  Treatment plan was not signed at time of office visit secondary to COVID-19 social distancing guidelines

## 2023-06-07 ENCOUNTER — TELEPHONE (OUTPATIENT)
Dept: PSYCHIATRY | Facility: CLINIC | Age: 58
End: 2023-06-07

## 2023-06-07 NOTE — TELEPHONE ENCOUNTER
Called patient left message to call regarding his appointment on 9/14  Appointment needs to either be changed to virtual or rescheduled

## 2023-06-20 ENCOUNTER — OFFICE VISIT (OUTPATIENT)
Dept: LAB | Facility: CLINIC | Age: 58
End: 2023-06-20
Payer: COMMERCIAL

## 2023-06-20 ENCOUNTER — APPOINTMENT (OUTPATIENT)
Dept: LAB | Facility: CLINIC | Age: 58
End: 2023-06-20
Payer: COMMERCIAL

## 2023-06-20 DIAGNOSIS — Z01.818 PRE-OPERATIVE EXAMINATION: ICD-10-CM

## 2023-06-20 DIAGNOSIS — G47.33 OBSTRUCTIVE SLEEP APNEA: ICD-10-CM

## 2023-06-20 LAB
ANION GAP SERPL CALCULATED.3IONS-SCNC: 2 MMOL/L
ATRIAL RATE: 64 BPM
BUN SERPL-MCNC: 16 MG/DL (ref 5–25)
CALCIUM SERPL-MCNC: 9.3 MG/DL (ref 8.3–10.1)
CHLORIDE SERPL-SCNC: 111 MMOL/L (ref 96–108)
CO2 SERPL-SCNC: 25 MMOL/L (ref 21–32)
CREAT SERPL-MCNC: 0.99 MG/DL (ref 0.6–1.3)
ERYTHROCYTE [DISTWIDTH] IN BLOOD BY AUTOMATED COUNT: 12.8 % (ref 11.6–15.1)
GFR SERPL CREATININE-BSD FRML MDRD: 83 ML/MIN/1.73SQ M
GLUCOSE P FAST SERPL-MCNC: 168 MG/DL (ref 65–99)
HCT VFR BLD AUTO: 50.4 % (ref 36.5–49.3)
HGB BLD-MCNC: 16.4 G/DL (ref 12–17)
MCH RBC QN AUTO: 30.8 PG (ref 26.8–34.3)
MCHC RBC AUTO-ENTMCNC: 32.5 G/DL (ref 31.4–37.4)
MCV RBC AUTO: 95 FL (ref 82–98)
P AXIS: 5 DEGREES
PLATELET # BLD AUTO: 394 THOUSANDS/UL (ref 149–390)
PMV BLD AUTO: 9 FL (ref 8.9–12.7)
POTASSIUM SERPL-SCNC: 4.2 MMOL/L (ref 3.5–5.3)
PR INTERVAL: 172 MS
QRS AXIS: 29 DEGREES
QRSD INTERVAL: 92 MS
QT INTERVAL: 414 MS
QTC INTERVAL: 427 MS
RBC # BLD AUTO: 5.33 MILLION/UL (ref 3.88–5.62)
SODIUM SERPL-SCNC: 138 MMOL/L (ref 135–147)
T WAVE AXIS: 39 DEGREES
VENTRICULAR RATE: 64 BPM
WBC # BLD AUTO: 9.27 THOUSAND/UL (ref 4.31–10.16)

## 2023-06-20 PROCEDURE — 93005 ELECTROCARDIOGRAM TRACING: CPT

## 2023-06-20 PROCEDURE — 85025 COMPLETE CBC W/AUTO DIFF WBC: CPT

## 2023-06-20 PROCEDURE — 93010 ELECTROCARDIOGRAM REPORT: CPT | Performed by: INTERNAL MEDICINE

## 2023-06-20 PROCEDURE — 36415 COLL VENOUS BLD VENIPUNCTURE: CPT

## 2023-06-20 PROCEDURE — 80048 BASIC METABOLIC PNL TOTAL CA: CPT

## 2023-06-30 ENCOUNTER — RA CDI HCC (OUTPATIENT)
Dept: OTHER | Facility: HOSPITAL | Age: 58
End: 2023-06-30

## 2023-08-07 ENCOUNTER — OFFICE VISIT (OUTPATIENT)
Dept: FAMILY MEDICINE CLINIC | Facility: CLINIC | Age: 58
End: 2023-08-07
Payer: COMMERCIAL

## 2023-08-07 VITALS
OXYGEN SATURATION: 97 % | SYSTOLIC BLOOD PRESSURE: 128 MMHG | HEART RATE: 82 BPM | BODY MASS INDEX: 30.71 KG/M2 | DIASTOLIC BLOOD PRESSURE: 70 MMHG | WEIGHT: 202.6 LBS | HEIGHT: 68 IN | TEMPERATURE: 98.9 F | RESPIRATION RATE: 16 BRPM

## 2023-08-07 DIAGNOSIS — R73.9 HYPERGLYCEMIA: ICD-10-CM

## 2023-08-07 DIAGNOSIS — F33.1 MODERATE EPISODE OF RECURRENT MAJOR DEPRESSIVE DISORDER (HCC): ICD-10-CM

## 2023-08-07 DIAGNOSIS — E78.2 MIXED HYPERLIPIDEMIA: Primary | ICD-10-CM

## 2023-08-07 PROCEDURE — 99214 OFFICE O/P EST MOD 30 MIN: CPT | Performed by: FAMILY MEDICINE

## 2023-08-07 NOTE — ASSESSMENT & PLAN NOTE
Not well controlled. It was discussed about low-fat diet and regular exercise. Continue to monitor fasting lipid profile. He was told to get his blood work done.

## 2023-08-07 NOTE — PROGRESS NOTES
Name: Antoine Griffith      : 1965      MRN: [id number]  Encounter Provider: Jolly Najjar, MD  Encounter Date: 2023   Encounter department: [facility]     1. Mixed hyperlipidemia  Assessment & Plan:  Not well controlled. It was discussed about low-fat diet and regular exercise. Continue to monitor fasting lipid profile. He was told to get his blood work done. Orders:  -     CBC and differential; Future  -     Comprehensive metabolic panel; Future  -     Lipid Panel with Direct LDL reflex; Future  -     TSH, 3rd generation with Free T4 reflex; Future    2. Hyperglycemia  Assessment & Plan:  Not well controlled. It was discussed about low-carb diet and regular exercise. Going to check his fasting glucose and A1c. Orders:  -     Hemoglobin A1C; Future    3. Moderate episode of recurrent major depressive disorder ([address])  Assessment & Plan:  Stable. Continue same medications and continue to follow-up with psych. 4. BMI 30.0-30.9,adult           Subjective     Is here today for follow-up multiple medical problems. He has been taking his medications and following with a psychiatrist.  He denies any complaint. He has been doing well. He is due for blood work. Review of Systems   Constitutional: Negative for chills and fever. HENT: Negative for trouble swallowing. Eyes: Negative for visual disturbance. Respiratory: Negative for cough and shortness of breath. Cardiovascular: Negative for chest pain and palpitations. Gastrointestinal: Negative for abdominal pain, blood in stool and vomiting. Endocrine: Negative for cold intolerance and heat intolerance. Genitourinary: Negative for difficulty urinating and dysuria. Musculoskeletal: Negative for gait problem. Skin: Negative for rash. Neurological: Negative for dizziness, syncope and headaches. Hematological: Negative for adenopathy.    Psychiatric/Behavioral: Negative for behavioral problems. Past Medical History:   Diagnosis Date   • Anxiety    • CPAP (continuous positive airway pressure) dependence    • Depression    • Sleep apnea      Past Surgical History:   Procedure Laterality Date   • MULTIPLE TOOTH EXTRACTIONS     • [place] PALATOPHARYNGOPLASTY N/A 4/7/2022    Procedure: UVULOPALATOPHARYNGOPLASTY (UPPP); Surgeon: Mark Melendez MD;  Location: [facility];  Service: ENT   • TESTICLE SURGERY       Family History   Problem Relation Age of Onset   • Anxiety disorder Father         Previously on Valium   • Depression Father      Social History     Socioeconomic History   • Marital status: Single     Spouse name: None   • Number of children: None   • Years of education: None   • Highest education level: None   Occupational History   • None   Tobacco Use   • Smoking status: Every Day     Packs/day: 0.25     Types: Cigarettes   • Smokeless tobacco: Current   • Tobacco comments:     1 cigarette every few days   Vaping Use   • Vaping Use: Never used   Substance and Sexual Activity   • Alcohol use: Not Currently   • Drug use: No   • Sexual activity: Yes     Partners: Female   Other Topics Concern   • None   Social History Narrative   • None     Social Determinants of Health     Financial Resource Strain: Not on file   Food Insecurity: Not on file   Transportation Needs: Not on file   Physical Activity: Not on file   Stress: Not on file   Social Connections: Not on file   Intimate Partner Violence: Not on file   Housing Stability: Not on file     Current Outpatient Medications on File Prior to Visit   Medication Sig   • ARIPiprazole (ABILIFY) 5 mg tablet Take 1 tablet (5 mg total) by mouth daily   • FLUoxetine (PROzac) 20 mg capsule Take 1 capsule (20 mg total) by mouth daily Take 1 capsule (20mg) daily in conjunction with 40mg capule for total of 60mg per day.    • FLUoxetine (PROzac) 40 MG capsule Take 1 capsule (40 mg total) by mouth daily   • fluticasone (FLONASE) 50 mcg/act nasal spray 2 sprays into each nostril daily   • sildenafil (VIAGRA) 100 mg tablet Take 1 tablet (100 mg total) by mouth daily as needed for erectile dysfunction     No Known Allergies  Immunization History   Administered Date(s) Administered   • COVID-19 MODERNA VACC 0.5 ML IM 04/05/2021   • INFLUENZA 01/10/2023   • Influenza, recombinant, quadrivalent,injectable, preservative free 10/19/2020, 01/10/2023       Objective     /70 (BP Location: Left arm, Patient Position: Sitting, Cuff Size: Large)   Pulse 82   Temp 98.9 °F (37.2 °C) (Tympanic)   Resp 16   Ht 5' 8" (1.727 m)   Wt 91.9 kg (202 lb 9.6 oz)   SpO2 97%   BMI 30.81 kg/m²     Physical Exam  Vitals and nursing note reviewed. Constitutional:       Appearance: He is well-developed. HENT:      Head: Normocephalic and atraumatic. Eyes:      Pupils: Pupils are equal, round, and reactive to light. Cardiovascular:      Rate and Rhythm: Normal rate and regular rhythm. Heart sounds: Normal heart sounds. Pulmonary:      Effort: Pulmonary effort is normal.      Breath sounds: Normal breath sounds. Abdominal:      General: Bowel sounds are normal.      Palpations: Abdomen is soft. Musculoskeletal:         General: Normal range of motion. Cervical back: Normal range of motion and neck supple. Lymphadenopathy:      Cervical: No cervical adenopathy. Skin:     General: Skin is warm. Findings: No rash. Neurological:      Mental Status: He is alert and oriented to person, place, and time. Cranial Nerves: No cranial nerve deficit. Paz Love MD BMI Counseling: Body mass index is 30.81 kg/m². The BMI is above normal. Nutrition recommendations include reducing portion sizes, decreasing overall calorie intake and 3-5 servings of fruits/vegetables daily. Exercise recommendations include moderate aerobic physical activity for 150 minutes/week.

## 2023-08-07 NOTE — ASSESSMENT & PLAN NOTE
Not well controlled. It was discussed about low-carb diet and regular exercise. Going to check his fasting glucose and A1c.

## 2023-08-28 DIAGNOSIS — F33.0 MILD EPISODE OF RECURRENT MAJOR DEPRESSIVE DISORDER (HCC): ICD-10-CM

## 2023-09-04 RX ORDER — FLUOXETINE HYDROCHLORIDE 40 MG/1
40 CAPSULE ORAL DAILY
Qty: 90 CAPSULE | Refills: 3 | Status: SHIPPED | OUTPATIENT
Start: 2023-09-04

## 2023-10-08 DIAGNOSIS — F33.0 MILD EPISODE OF RECURRENT MAJOR DEPRESSIVE DISORDER (HCC): ICD-10-CM

## 2023-10-09 RX ORDER — FLUOXETINE HYDROCHLORIDE 20 MG/1
CAPSULE ORAL
Qty: 90 CAPSULE | Refills: 3 | Status: SHIPPED | OUTPATIENT
Start: 2023-10-09

## 2023-10-11 ENCOUNTER — TELEPHONE (OUTPATIENT)
Dept: PSYCHIATRY | Facility: CLINIC | Age: 58
End: 2023-10-11

## 2023-10-11 NOTE — TELEPHONE ENCOUNTER
NO-SHOW LETTER MAILED TO Mignon Haider.   ADDRESS: [address] [place] [id number]

## 2023-10-11 NOTE — TELEPHONE ENCOUNTER
Patient contacted the office to schedule a follow up visit with provider.  Patient is now scheduled for 2/13  at 8:30 in office    Pt stated  he called and lm on answering machine to cx appt that was missed on . 10/10/23

## 2024-02-12 ENCOUNTER — OFFICE VISIT (OUTPATIENT)
Dept: FAMILY MEDICINE CLINIC | Facility: CLINIC | Age: 59
End: 2024-02-12
Payer: COMMERCIAL

## 2024-02-12 VITALS
SYSTOLIC BLOOD PRESSURE: 118 MMHG | TEMPERATURE: 97.6 F | WEIGHT: 197 LBS | OXYGEN SATURATION: 95 % | DIASTOLIC BLOOD PRESSURE: 68 MMHG | HEART RATE: 80 BPM | RESPIRATION RATE: 16 BRPM | HEIGHT: 68 IN | BODY MASS INDEX: 29.86 KG/M2

## 2024-02-12 DIAGNOSIS — Z12.12 ENCOUNTER FOR COLORECTAL CANCER SCREENING: ICD-10-CM

## 2024-02-12 DIAGNOSIS — Z00.01 ABNORMAL WELLNESS EXAM: ICD-10-CM

## 2024-02-12 DIAGNOSIS — Z12.5 PROSTATE CANCER SCREENING: ICD-10-CM

## 2024-02-12 DIAGNOSIS — Z12.11 ENCOUNTER FOR COLORECTAL CANCER SCREENING: ICD-10-CM

## 2024-02-12 DIAGNOSIS — R73.9 HYPERGLYCEMIA: ICD-10-CM

## 2024-02-12 DIAGNOSIS — G47.33 OBSTRUCTIVE SLEEP APNEA SYNDROME: ICD-10-CM

## 2024-02-12 DIAGNOSIS — E78.2 MIXED HYPERLIPIDEMIA: Primary | ICD-10-CM

## 2024-02-12 DIAGNOSIS — N52.9 ERECTILE DYSFUNCTION, UNSPECIFIED ERECTILE DYSFUNCTION TYPE: ICD-10-CM

## 2024-02-12 PROCEDURE — 99214 OFFICE O/P EST MOD 30 MIN: CPT | Performed by: FAMILY MEDICINE

## 2024-02-12 PROCEDURE — 99396 PREV VISIT EST AGE 40-64: CPT | Performed by: FAMILY MEDICINE

## 2024-02-12 NOTE — ASSESSMENT & PLAN NOTE
It was discussed about immunizations, diet, exercise and safety measures.  He states he received Shingrix vaccine already.  He was given referral to get colonoscopy.  I am going to check his PSA.

## 2024-02-12 NOTE — PROGRESS NOTES
Name: Cleveland Serrano      : 1965      MRN: [id number]  Encounter Provider: Dg Blackman MD  Encounter Date: 2024   Encounter department: [facility]    Assessment & Plan     1. Mixed hyperlipidemia  Assessment & Plan:  Not well controlled.  It was discussed about low-fat diet and regular exercise.  Continue to monitor fasting lipid profile.  He was told to get his blood work done.    Orders:  -     CBC and differential; Future  -     Comprehensive metabolic panel; Future  -     TSH, 3rd generation with Free T4 reflex; Future  -     Lipid Panel with Direct LDL reflex; Future    2. Hyperglycemia  Assessment & Plan:  Not well controlled.  It was discussed about low-carb diet and regular exercise.  Going to check his fasting glucose and A1c.    Orders:  -     Hemoglobin A1C; Future    3. Erectile dysfunction, unspecified erectile dysfunction type  Assessment & Plan:  Continue same.      4. Obstructive sleep apnea syndrome  Assessment & Plan:  He is getting inspire.  Continue to follow with ENT and sleep medicine.      5. Abnormal wellness exam  Assessment & Plan:  It was discussed about immunizations, diet, exercise and safety measures.  He states he received Shingrix vaccine already.  He was given referral to get colonoscopy.  I am going to check his PSA.      6. Prostate cancer screening  -     PSA, Total Screen; Future    7. Encounter for colorectal cancer screening  -     Ambulatory referral to Gastroenterology; Future    8. BMI 29.0-29.9,adult           Subjective     Is here today for follow-up multiple medical problems and for wellness exam.  He is due for blood work.  He is getting INSERTION UPPER AIRWAY STIMULATOR inspire all on  for his sleep apnea.  He denies any other complaint.  He cut down on smoking.    Depression  Pertinent negatives include no abdominal pain, chest pain, chills, coughing, fever, headaches, rash or vomiting.     Review of Systems    Constitutional:  Negative for chills and fever.   HENT:  Negative for trouble swallowing.    Eyes:  Negative for visual disturbance.   Respiratory:  Negative for cough and shortness of breath.    Cardiovascular:  Negative for chest pain and palpitations.   Gastrointestinal:  Negative for abdominal pain, blood in stool and vomiting.   Endocrine: Negative for cold intolerance and heat intolerance.   Genitourinary:  Negative for difficulty urinating and dysuria.   Musculoskeletal:  Negative for gait problem.   Skin:  Negative for rash.   Neurological:  Negative for dizziness, syncope and headaches.   Hematological:  Negative for adenopathy.   Psychiatric/Behavioral:  Positive for depression. Negative for behavioral problems.        Past Medical History:   Diagnosis Date   • Anxiety    • CPAP (continuous positive airway pressure) dependence    • Depression    • Sleep apnea      Past Surgical History:   Procedure Laterality Date   • MULTIPLE TOOTH EXTRACTIONS     • [place] PALATOPHARYNGOPLASTY N/A 4/7/2022    Procedure: UVULOPALATOPHARYNGOPLASTY (UPPP);  Surgeon: Jose Tyson MD;  Location[facility];  Service: ENT   • TESTICLE SURGERY       Family History   Problem Relation Age of Onset   • Anxiety disorder Father         Previously on Valium   • Depression Father      Social History     Socioeconomic History   • Marital status: Single     Spouse name: None   • Number of children: None   • Years of education: None   • Highest education level: None   Occupational History   • None   Tobacco Use   • Smoking status: Every Day     Current packs/day: 0.25     Types: Cigarettes   • Smokeless tobacco: Current   • Tobacco comments:     1 cigarette every few days   Vaping Use   • Vaping status: Never Used   Substance and Sexual Activity   • Alcohol use: Not Currently   • Drug use: No   • Sexual activity: Yes     Partners: Female   Other Topics Concern   • None   Social History Narrative   • None     Social Determinants of  "Health     Financial Resource Strain: Not on file   Food Insecurity: Not on file   Transportation Needs: Not on file   Physical Activity: Not on file   Stress: Not on file   Social Connections: Not on file   Intimate Partner Violence: Not on file   Housing Stability: Not on file     Current Outpatient Medications on File Prior to Visit   Medication Sig   • ARIPiprazole (ABILIFY) 5 mg tablet Take 1 tablet (5 mg total) by mouth daily   • FLUoxetine (PROzac) 20 mg capsule TAKE 1 CAPSULE DAILY IN CONJUNCTION  WITH 40 MG CAPSULE FOR TOTAL OF 60 MG PER DAY   • FLUoxetine (PROzac) 40 MG capsule TAKE 1 CAPSULE DAILY   • fluticasone (FLONASE) 50 mcg/act nasal spray 2 sprays into each nostril daily   • sildenafil (VIAGRA) 100 mg tablet Take 1 tablet (100 mg total) by mouth daily as needed for erectile dysfunction     No Known Allergies  Immunization History   Administered Date(s) Administered   • COVID-19 MODERNA VACC 0.5 ML IM 04/05/2021, 05/03/2021, 11/10/2021   • COVID-19 Pfizer Vac BIVALENT Kirill-sucrose 12 Yr+ IM 11/29/2022   • INFLUENZA 10/19/2020, 01/10/2023   • Influenza, recombinant, quadrivalent,injectable, preservative free 10/19/2020, 01/10/2023       Objective     /68 (BP Location: Left arm, Patient Position: Sitting, Cuff Size: Adult)   Pulse 80   Temp 97.6 °F (36.4 °C) (Tympanic)   Resp 16   Ht 5' 8\" (1.727 m)   Wt 89.4 kg (197 lb)   SpO2 95%   BMI 29.95 kg/m²     Physical Exam  Vitals and nursing note reviewed.   Constitutional:       Appearance: He is well-developed.   HENT:      Head: Normocephalic and atraumatic.   Eyes:      Pupils: Pupils are equal, round, and reactive to light.   Cardiovascular:      Rate and Rhythm: Normal rate and regular rhythm.      Heart sounds: Normal heart sounds.   Pulmonary:      Effort: Pulmonary effort is normal.      Breath sounds: Normal breath sounds.   Abdominal:      General: Bowel sounds are normal.      Palpations: Abdomen is soft.   Musculoskeletal:      " Cervical back: Normal range of motion and neck supple.   Lymphadenopathy:      Cervical: No cervical adenopathy.   Skin:     General: Skin is warm.      Findings: No rash.   Neurological:      Mental Status: He is alert and oriented to person, place, and time.       Dg Blackman MD  BMI Counseling: Body mass index is 29.95 kg/m². The BMI is above normal. Nutrition recommendations include reducing portion sizes, decreasing overall calorie intake, and 3-5 servings of fruits/vegetables daily. Exercise recommendations include moderate aerobic physical activity for 150 minutes/week.

## 2024-02-21 PROBLEM — Z12.11 COLON CANCER SCREENING: Status: RESOLVED | Noted: 2019-08-12 | Resolved: 2024-02-21

## 2024-02-21 PROBLEM — Z00.00 WELL ADULT EXAM: Status: RESOLVED | Noted: 2019-08-12 | Resolved: 2024-02-21

## 2024-02-26 ENCOUNTER — APPOINTMENT (OUTPATIENT)
Dept: LAB | Facility: CLINIC | Age: 59
End: 2024-02-26
Payer: COMMERCIAL

## 2024-02-26 DIAGNOSIS — Z12.5 PROSTATE CANCER SCREENING: ICD-10-CM

## 2024-02-26 DIAGNOSIS — E78.2 MIXED HYPERLIPIDEMIA: ICD-10-CM

## 2024-02-26 DIAGNOSIS — R73.9 HYPERGLYCEMIA: ICD-10-CM

## 2024-02-26 DIAGNOSIS — G47.33 OBSTRUCTIVE SLEEP APNEA (ADULT) (PEDIATRIC): ICD-10-CM

## 2024-02-26 DIAGNOSIS — Z01.818 PRE-OPERATIVE EXAMINATION: ICD-10-CM

## 2024-02-26 LAB
ALBUMIN SERPL BCP-MCNC: 4.2 G/DL (ref 3.5–5)
ALP SERPL-CCNC: 82 U/L (ref 34–104)
ALT SERPL W P-5'-P-CCNC: 13 U/L (ref 7–52)
ANION GAP SERPL CALCULATED.3IONS-SCNC: 7 MMOL/L
AST SERPL W P-5'-P-CCNC: 10 U/L (ref 13–39)
BASOPHILS # BLD AUTO: 0.04 THOUSANDS/ÂΜL (ref 0–0.1)
BASOPHILS NFR BLD AUTO: 1 % (ref 0–1)
BILIRUB SERPL-MCNC: 0.37 MG/DL (ref 0.2–1)
BUN SERPL-MCNC: 17 MG/DL (ref 5–25)
CALCIUM SERPL-MCNC: 9.1 MG/DL (ref 8.4–10.2)
CHLORIDE SERPL-SCNC: 107 MMOL/L (ref 96–108)
CHOLEST SERPL-MCNC: 243 MG/DL
CO2 SERPL-SCNC: 26 MMOL/L (ref 21–32)
CREAT SERPL-MCNC: 0.94 MG/DL (ref 0.6–1.3)
EOSINOPHIL # BLD AUTO: 0.21 THOUSAND/ÂΜL (ref 0–0.61)
EOSINOPHIL NFR BLD AUTO: 3 % (ref 0–6)
ERYTHROCYTE [DISTWIDTH] IN BLOOD BY AUTOMATED COUNT: 13.1 % (ref 11.6–15.1)
EST. AVERAGE GLUCOSE BLD GHB EST-MCNC: 140 MG/DL
GFR SERPL CREATININE-BSD FRML MDRD: 88 ML/MIN/1.73SQ M
GLUCOSE P FAST SERPL-MCNC: 99 MG/DL (ref 65–99)
HBA1C MFR BLD: 6.5 %
HCT VFR BLD AUTO: 51.4 % (ref 36.5–49.3)
HDLC SERPL-MCNC: 36 MG/DL
HGB BLD-MCNC: 16.7 G/DL (ref 12–17)
IMM GRANULOCYTES # BLD AUTO: 0.05 THOUSAND/UL (ref 0–0.2)
IMM GRANULOCYTES NFR BLD AUTO: 1 % (ref 0–2)
LDLC SERPL CALC-MCNC: 160 MG/DL (ref 0–100)
LYMPHOCYTES # BLD AUTO: 2.26 THOUSANDS/ÂΜL (ref 0.6–4.47)
LYMPHOCYTES NFR BLD AUTO: 28 % (ref 14–44)
MCH RBC QN AUTO: 30.4 PG (ref 26.8–34.3)
MCHC RBC AUTO-ENTMCNC: 32.5 G/DL (ref 31.4–37.4)
MCV RBC AUTO: 94 FL (ref 82–98)
MONOCYTES # BLD AUTO: 0.57 THOUSAND/ÂΜL (ref 0.17–1.22)
MONOCYTES NFR BLD AUTO: 7 % (ref 4–12)
NEUTROPHILS # BLD AUTO: 4.94 THOUSANDS/ÂΜL (ref 1.85–7.62)
NEUTS SEG NFR BLD AUTO: 60 % (ref 43–75)
NRBC BLD AUTO-RTO: 0 /100 WBCS
PLATELET # BLD AUTO: 406 THOUSANDS/UL (ref 149–390)
PMV BLD AUTO: 9.1 FL (ref 8.9–12.7)
POTASSIUM SERPL-SCNC: 4.5 MMOL/L (ref 3.5–5.3)
PROT SERPL-MCNC: 6.6 G/DL (ref 6.4–8.4)
PSA SERPL-MCNC: 1.54 NG/ML (ref 0–4)
RBC # BLD AUTO: 5.5 MILLION/UL (ref 3.88–5.62)
SODIUM SERPL-SCNC: 140 MMOL/L (ref 135–147)
TRIGL SERPL-MCNC: 236 MG/DL
TSH SERPL DL<=0.05 MIU/L-ACNC: 0.62 UIU/ML (ref 0.45–4.5)
WBC # BLD AUTO: 8.07 THOUSAND/UL (ref 4.31–10.16)

## 2024-02-26 PROCEDURE — G0103 PSA SCREENING: HCPCS

## 2024-02-26 PROCEDURE — 36415 COLL VENOUS BLD VENIPUNCTURE: CPT

## 2024-02-26 PROCEDURE — 80061 LIPID PANEL: CPT

## 2024-02-26 PROCEDURE — 83036 HEMOGLOBIN GLYCOSYLATED A1C: CPT

## 2024-02-26 PROCEDURE — 85025 COMPLETE CBC W/AUTO DIFF WBC: CPT

## 2024-02-26 PROCEDURE — 84443 ASSAY THYROID STIM HORMONE: CPT

## 2024-02-26 PROCEDURE — 80053 COMPREHEN METABOLIC PANEL: CPT

## 2024-03-01 ENCOUNTER — PREP FOR PROCEDURE (OUTPATIENT)
Age: 59
End: 2024-03-01

## 2024-03-01 ENCOUNTER — TELEPHONE (OUTPATIENT)
Age: 59
End: 2024-03-01

## 2024-03-01 DIAGNOSIS — Z12.11 SCREENING FOR COLON CANCER: Primary | ICD-10-CM

## 2024-03-01 NOTE — TELEPHONE ENCOUNTER
5' 8 197 BMI 29.95      Scheduled date of colonoscopy (as of today):5/17/24    Physician performing colonoscopy:Dr Garcia    Location of colonoscopy:[facility]    Bowel prep reviewed with patient:miralax/dulcolax    Instructions reviewed with patient by:pt requests prep instructions be sent to his home address    Clearances: N/A

## 2024-03-04 ENCOUNTER — APPOINTMENT (OUTPATIENT)
Dept: LAB | Facility: CLINIC | Age: 59
End: 2024-03-04

## 2024-03-04 DIAGNOSIS — G47.33 OBSTRUCTIVE SLEEP APNEA (ADULT) (PEDIATRIC): ICD-10-CM

## 2024-03-04 DIAGNOSIS — Z01.818 PRE-OPERATIVE EXAMINATION: ICD-10-CM

## 2024-03-05 LAB
ATRIAL RATE: 78 BPM
P AXIS: 19 DEGREES
PR INTERVAL: 160 MS
QRS AXIS: 59 DEGREES
QRSD INTERVAL: 84 MS
QT INTERVAL: 390 MS
QTC INTERVAL: 444 MS
T WAVE AXIS: 55 DEGREES
VENTRICULAR RATE: 78 BPM

## 2024-03-12 ENCOUNTER — ANESTHESIA EVENT (OUTPATIENT)
Dept: PERIOP | Facility: HOSPITAL | Age: 59
End: 2024-03-12
Payer: COMMERCIAL

## 2024-03-13 NOTE — PRE-PROCEDURE INSTRUCTIONS
Pre-Surgery Instructions:   Medication Instructions    FLUoxetine (PROzac) 20 mg capsule Take day of surgery.    FLUoxetine (PROzac) 40 MG capsule Take day of surgery.    fluticasone (FLONASE) 50 mcg/act nasal spray Take day of surgery.    sildenafil (VIAGRA) 100 mg tablet Hold day of surgery.   Medication instructions for day surgery reviewed. Please use only a sip of water to take your instructed medications. Avoid all over the counter vitamins, supplements and NSAIDS for one week prior to surgery per anesthesia guidelines. Tylenol is ok to take as needed.     You will receive a call one business day prior to surgery with an arrival time and hospital directions. If your surgery is scheduled on a Monday, the hospital will be calling you on the Friday prior to your surgery. If you have not heard from anyone by 8pm, please call the hospital supervisor through the hospital  at [phone]. ([place] [phone] or [place] [phone]).    Do not eat or drink anything after midnight the night before your surgery, including candy, mints, lifesavers, or chewing gum. Do not drink alcohol 24hrs before your surgery. Try not to smoke at least 24hrs before your surgery.       Follow the pre surgery showering instructions as listed in the “My Surgical Experience Booklet” or otherwise provided by your surgeon's office. Do not use a blade to shave the surgical area 1 week before surgery. It is okay to use a clean electric clippers up to 24 hours before surgery. Do not apply any lotions, creams, including makeup, cologne, deodorant, or perfumes after showering on the day of your surgery. Do not use dry shampoo, hair spray, hair gel, or any type of hair products.     No contact lenses, eye make-up, or artificial eyelashes. Remove nail polish, including gel polish, and any artificial, gel, or acrylic nails if possible. Remove all jewelry including rings and body piercing jewelry.     Wear causal clothing that is easy to  take on and off. Consider your type of surgery.    Keep any valuables, jewelry, piercings at home. Please bring any specially ordered equipment (sling, braces) if indicated.    Arrange for a responsible person to drive you to and from the hospital on the day of your surgery. Please confirm the visitor policy for the day of your procedure when you receive your phone call with an arrival time.     Call the surgeon's office with any new illnesses, exposures, or additional questions prior to surgery.    Please reference your “My Surgical Experience Booklet” for additional information to prepare for your upcoming surgery.

## 2024-03-14 ENCOUNTER — ANESTHESIA (OUTPATIENT)
Dept: PERIOP | Facility: HOSPITAL | Age: 59
End: 2024-03-14
Payer: COMMERCIAL

## 2024-04-08 ENCOUNTER — TELEPHONE (OUTPATIENT)
Dept: FAMILY MEDICINE CLINIC | Facility: CLINIC | Age: 59
End: 2024-04-08

## 2024-04-08 DIAGNOSIS — E78.2 MIXED HYPERLIPIDEMIA: Primary | ICD-10-CM

## 2024-04-08 DIAGNOSIS — R73.9 HYPERGLYCEMIA: ICD-10-CM

## 2024-04-08 NOTE — TELEPHONE ENCOUNTER
----- Message from Dg Blackman MD sent at 4/7/2024 12:23 PM EDT -----  Elevated A1c at 6.5.  Also cholesterol came back elevated but improved from before.  Recommend low-fat and low-carb diet.  Repeat blood work before next appointment.

## 2024-04-11 ENCOUNTER — HOSPITAL ENCOUNTER (OUTPATIENT)
Facility: HOSPITAL | Age: 59
Setting detail: OUTPATIENT SURGERY
Discharge: HOME | End: 2024-04-11
Attending: OTOLARYNGOLOGY | Admitting: OTOLARYNGOLOGY
Payer: COMMERCIAL

## 2024-04-11 ENCOUNTER — APPOINTMENT (OUTPATIENT)
Dept: RADIOLOGY | Facility: HOSPITAL | Age: 59
End: 2024-04-11
Payer: COMMERCIAL

## 2024-04-11 VITALS
BODY MASS INDEX: 29.3 KG/M2 | SYSTOLIC BLOOD PRESSURE: 159 MMHG | HEART RATE: 78 BPM | RESPIRATION RATE: 18 BRPM | DIASTOLIC BLOOD PRESSURE: 81 MMHG | OXYGEN SATURATION: 98 % | WEIGHT: 192.68 LBS | TEMPERATURE: 97.2 F

## 2024-04-11 DIAGNOSIS — G47.33 OBSTRUCTIVE SLEEP APNEA SYNDROME: Primary | ICD-10-CM

## 2024-04-11 PROCEDURE — C1767 GENERATOR, NEURO NON-RECHARG: HCPCS | Performed by: OTOLARYNGOLOGY

## 2024-04-11 PROCEDURE — C1778 LEAD, NEUROSTIMULATOR: HCPCS | Performed by: OTOLARYNGOLOGY

## 2024-04-11 PROCEDURE — 72040 X-RAY EXAM NECK SPINE 2-3 VW: CPT

## 2024-04-11 PROCEDURE — 71045 X-RAY EXAM CHEST 1 VIEW: CPT

## 2024-04-11 PROCEDURE — C1787 PATIENT PROGR, NEUROSTIM: HCPCS | Performed by: OTOLARYNGOLOGY

## 2024-04-11 DEVICE — THE INSPIRE® RESPIRATORY SENSING LEAD (MODEL 4340) IS DESIGNED TO DETECT RESPIRATORY EFFORT. THE LEAD FEATURES A PRESSURE SENSITIVE MEMBRANE THAT CONVERTS THE MECHANICAL ENERGY OF RESPIRATION INTO AN ELECTRICAL SIGNAL. THE LEAD INCORPORATES A STANDARD CONNECTOR FOR COUPLING TO THE INSPIRE IMPLANTABLE PULSE GENERATOR (IPG) FOR TREATMENT OF OBSTRUCTIVE SLEEP APNEA.
Type: IMPLANTABLE DEVICE | Site: CHEST | Status: FUNCTIONAL
Brand: INSPIRE

## 2024-04-11 DEVICE — THE INSPIRE® STIMULATION LEAD (MODEL 4063) IS DESIGNED TO DELIVER STIMULATION TO THE HYPOGLOSSAL NERVE FOR THE TREATMENT OF OBSTRUCTIVE SLEEP APNEA. THE LEAD FEATURES A FLEXIBLE, SELF-SIZING CUFF. ELECTRODES IN THE INNER SURFACE OF THE CUFF DELIVER STIMULATION TO THE NERVE. THE LEAD INCORPORATES A STANDARD CONNECTOR FOR COUPLING TO THE INSPIRE IMPLANTABLE PULSE GENERATOR (IPG).
Type: IMPLANTABLE DEVICE | Site: NECK | Status: FUNCTIONAL
Brand: INSPIRE

## 2024-04-11 DEVICE — THE MODEL 3028 IPG CONTAINS ELECTRONICS AND A BATTERY THAT ARE SEALED INSIDE A TITANIUM CASE.  THE IPG IS IMPLANTED SUBCUTANEOUSLY, BELOW THE CLAVICLE IN THE UPPER CHEST, AND CONNECT TO THE STIMULATION LEAD AND SENSING LEAD.  THE MODEL 3028 DOES NOT CONTAIN ANY SOFTWARE OR FIRMWARE.  ALL FUNCTIONS INCLUDING THE TELEMETRY AND ALGORITHM HAVE BEEN DESIGNED INTO THE HARDWARE OF THE IPG.  THE ALGORITHM SYNCHRONIZES STIMULATION OF THE HYPOGLOSSAL NERVE WITH RESPIRATION SIGNALS.   THE IPG PROCESSES THE SAME DYNAMIC RANGE OF PRESSURE SIGNALS (2-48 CMH2O) IN ORDER TO ACCOUNT FOR PRESSURE READING VARIABILITY.  BASED ON TYPICAL SETTINGS FROM THE STAR PIVOTAL TRIAL, THE LONGEVITY OF THE MODEL 3028’S BATTERY WILL AVERAGE 10 YEARS ALTHOUGH THE DEVICE IS SMALLER THAN THE CURRENTLY APPROVED VERSION (MODEL 3024).  IN ADDITION THE MODEL 3028 IPG WILL ALLOW PATIENTS TO SAFELY UNDERGO MAGNETIC RESONANCE IMAGING (MRI) UNDER SPECIFIED CONDITIONS.
Type: IMPLANTABLE DEVICE | Site: CHEST | Status: FUNCTIONAL
Brand: INSPIRE

## 2024-04-11 RX ORDER — SUCCINYLCHOLINE/SOD CL,ISO/PF 100 MG/5ML
SYRINGE (ML) INTRAVENOUS AS NEEDED
Status: DISCONTINUED | OUTPATIENT
Start: 2024-04-11 | End: 2024-04-11

## 2024-04-11 RX ORDER — FENTANYL CITRATE 50 UG/ML
INJECTION, SOLUTION INTRAMUSCULAR; INTRAVENOUS AS NEEDED
Status: DISCONTINUED | OUTPATIENT
Start: 2024-04-11 | End: 2024-04-11

## 2024-04-11 RX ORDER — PROPOFOL 10 MG/ML
INJECTION, EMULSION INTRAVENOUS CONTINUOUS PRN
Status: DISCONTINUED | OUTPATIENT
Start: 2024-04-11 | End: 2024-04-11

## 2024-04-11 RX ORDER — ROCURONIUM BROMIDE 10 MG/ML
INJECTION, SOLUTION INTRAVENOUS AS NEEDED
Status: DISCONTINUED | OUTPATIENT
Start: 2024-04-11 | End: 2024-04-11

## 2024-04-11 RX ORDER — OXYCODONE HCL 5 MG/5 ML
5 SOLUTION, ORAL ORAL EVERY 4 HOURS PRN
Status: DISCONTINUED | OUTPATIENT
Start: 2024-04-11 | End: 2024-04-11 | Stop reason: HOSPADM

## 2024-04-11 RX ORDER — SODIUM CHLORIDE 9 MG/ML
125 INJECTION, SOLUTION INTRAVENOUS CONTINUOUS
Status: DISCONTINUED | OUTPATIENT
Start: 2024-04-11 | End: 2024-04-11 | Stop reason: HOSPADM

## 2024-04-11 RX ORDER — MAGNESIUM HYDROXIDE 1200 MG/15ML
LIQUID ORAL AS NEEDED
Status: DISCONTINUED | OUTPATIENT
Start: 2024-04-11 | End: 2024-04-11 | Stop reason: HOSPADM

## 2024-04-11 RX ORDER — ONDANSETRON 2 MG/ML
4 INJECTION INTRAMUSCULAR; INTRAVENOUS ONCE AS NEEDED
Status: DISCONTINUED | OUTPATIENT
Start: 2024-04-11 | End: 2024-04-11 | Stop reason: HOSPADM

## 2024-04-11 RX ORDER — PROPOFOL 10 MG/ML
INJECTION, EMULSION INTRAVENOUS AS NEEDED
Status: DISCONTINUED | OUTPATIENT
Start: 2024-04-11 | End: 2024-04-11

## 2024-04-11 RX ORDER — ONDANSETRON 2 MG/ML
INJECTION INTRAMUSCULAR; INTRAVENOUS AS NEEDED
Status: DISCONTINUED | OUTPATIENT
Start: 2024-04-11 | End: 2024-04-11

## 2024-04-11 RX ORDER — FENTANYL CITRATE/PF 50 MCG/ML
25 SYRINGE (ML) INJECTION
Status: DISCONTINUED | OUTPATIENT
Start: 2024-04-11 | End: 2024-04-11 | Stop reason: HOSPADM

## 2024-04-11 RX ORDER — CEFAZOLIN SODIUM 2 G/50ML
2000 SOLUTION INTRAVENOUS ONCE
Status: COMPLETED | OUTPATIENT
Start: 2024-04-11 | End: 2024-04-11

## 2024-04-11 RX ORDER — OXYCODONE HYDROCHLORIDE 5 MG/1
5 TABLET ORAL EVERY 4 HOURS PRN
Qty: 10 TABLET | Refills: 0 | Status: SHIPPED | OUTPATIENT
Start: 2024-04-11 | End: 2024-04-21

## 2024-04-11 RX ORDER — ACETAMINOPHEN 160 MG/5ML
650 SUSPENSION ORAL ONCE
Status: DISCONTINUED | OUTPATIENT
Start: 2024-04-11 | End: 2024-04-11 | Stop reason: HOSPADM

## 2024-04-11 RX ORDER — LIDOCAINE HYDROCHLORIDE AND EPINEPHRINE 10; 10 MG/ML; UG/ML
INJECTION, SOLUTION INFILTRATION; PERINEURAL AS NEEDED
Status: DISCONTINUED | OUTPATIENT
Start: 2024-04-11 | End: 2024-04-11 | Stop reason: HOSPADM

## 2024-04-11 RX ORDER — DEXAMETHASONE SODIUM PHOSPHATE 10 MG/ML
INJECTION, SOLUTION INTRAMUSCULAR; INTRAVENOUS AS NEEDED
Status: DISCONTINUED | OUTPATIENT
Start: 2024-04-11 | End: 2024-04-11

## 2024-04-11 RX ORDER — LIDOCAINE HYDROCHLORIDE 20 MG/ML
INJECTION, SOLUTION EPIDURAL; INFILTRATION; INTRACAUDAL; PERINEURAL AS NEEDED
Status: DISCONTINUED | OUTPATIENT
Start: 2024-04-11 | End: 2024-04-11

## 2024-04-11 RX ORDER — MIDAZOLAM HYDROCHLORIDE 2 MG/2ML
INJECTION, SOLUTION INTRAMUSCULAR; INTRAVENOUS AS NEEDED
Status: DISCONTINUED | OUTPATIENT
Start: 2024-04-11 | End: 2024-04-11

## 2024-04-11 RX ADMIN — SODIUM CHLORIDE: 0.9 INJECTION, SOLUTION INTRAVENOUS at 11:05

## 2024-04-11 RX ADMIN — LIDOCAINE HYDROCHLORIDE 100 MG: 20 INJECTION, SOLUTION EPIDURAL; INFILTRATION; INTRACAUDAL at 11:22

## 2024-04-11 RX ADMIN — MIDAZOLAM 2 MG: 1 INJECTION INTRAMUSCULAR; INTRAVENOUS at 11:18

## 2024-04-11 RX ADMIN — PROPOFOL 200 MG: 10 INJECTION, EMULSION INTRAVENOUS at 11:22

## 2024-04-11 RX ADMIN — PHENYLEPHRINE HYDROCHLORIDE 30 MCG/MIN: 10 INJECTION INTRAVENOUS at 11:51

## 2024-04-11 RX ADMIN — FENTANYL CITRATE 50 MCG: 50 INJECTION INTRAMUSCULAR; INTRAVENOUS at 11:22

## 2024-04-11 RX ADMIN — SODIUM CHLORIDE: 0.9 INJECTION, SOLUTION INTRAVENOUS at 11:40

## 2024-04-11 RX ADMIN — DEXAMETHASONE SODIUM PHOSPHATE 10 MG: 10 INJECTION INTRAMUSCULAR; INTRAVENOUS at 11:29

## 2024-04-11 RX ADMIN — CEFAZOLIN SODIUM 2000 MG: 2 SOLUTION INTRAVENOUS at 11:14

## 2024-04-11 RX ADMIN — Medication 100 MG: at 11:22

## 2024-04-11 RX ADMIN — PROPOFOL 140 MCG/KG/MIN: 10 INJECTION, EMULSION INTRAVENOUS at 11:22

## 2024-04-11 RX ADMIN — ROCURONIUM BROMIDE 10 MG: 10 INJECTION, SOLUTION INTRAVENOUS at 11:22

## 2024-04-11 RX ADMIN — SODIUM CHLORIDE 0.15 MCG/KG/MIN: 9 INJECTION, SOLUTION INTRAVENOUS at 11:22

## 2024-04-11 RX ADMIN — FENTANYL CITRATE 50 MCG: 50 INJECTION INTRAMUSCULAR; INTRAVENOUS at 11:38

## 2024-04-11 RX ADMIN — ONDANSETRON 4 MG: 2 INJECTION INTRAMUSCULAR; INTRAVENOUS at 11:29

## 2024-04-11 NOTE — H&P
Cleveland Serrano is a 59 y.o.male who presents for re-evaluation of CECILIA. Previous UPPP. Recent sleep study performed on 2/27/23 demonstrated an AHI of 55.7. Continuing to snore. Has tried BPAP but has difficulty with mask fit. DISE showed no evidence of complete concentric collapse and that he is a candidate for Inspire.     Past Medical History:   Diagnosis Date    Anxiety     CPAP (continuous positive airway pressure) dependence     Depression     Sleep apnea        /67   Pulse 73   Temp 97.9 °F (36.6 °C) (Temporal)   Resp 16   Wt 87.4 kg (192 lb 10.9 oz)   SpO2 96%   BMI 29.30 kg/m²       Physical Exam   Constitutional: Oriented to person, place, and time. Well-developed and well-nourished, no apparent distress, non-toxic appearance. Cooperative, able to hear and answer questions without difficulty.    Voice: Normal voice quality.  Head: Normocephalic, atraumatic.  No scars, masses or lesions.  Face: Symmetric, no edema, no sinus tenderness.  Eyes: Vision grossly intact, extra-ocular movement intact.  Ears: External ear normal.  Bilateral tympanic membranes are intact with intact normal landmarks. No post-auricular erythema or tenderness.  Nose: Septum midline, nares clear.  Mucosa moist, turbinates well appearing.  No crusting, polyps or discharge evident.  Oral cavity: Dentition intact.  Mucosa moist, lips normal.  Tongue mobile, floor of mouth normal.  Hard palate unremarkable.  No masses or lesions.   Oropharynx: Uvula is midline, soft palate normal.  Unremarkable oropharyngeal inlet.  Tonsils unremarkable.  Posterior pharyngeal wall clear. No masses or lesions.  Salivary glands:  Parotid glands and submandibular glands symmetric, no enlargement or tenderness.  Neck: Normal laryngeal elevation with swallow.  Trachea midline.  No masses or lesions. No palpable adenopathy.  Thyroid: normal in size, unremarkable without tenderness or palpable nodules.  Pulmonary/Chest: Normal effort and rate. No  respiratory distress.   Musculoskeletal: Normal range of motion.   Neurological: Cranial nerves 2-12 intact.  Skin: Skin is warm and dry.   Psychiatric: Normal mood and affect.      A/P: Obstructive sleep apnea: We discussed the nature of obstructive sleep apnea.  We discussed the natural history of sleep apnea. We discussed options for management. We discussed non-surgical management including weight loss, mandibular advancement devices, and positive airway pressure therapy including various options. We discussed that he is not tolerating his CPAP and has at least moderate CECILIA with an AHI of 55.7 and BMI of 30.7, he would like to move forward with Inspire hypoglossal nerve stimulator. Risks, benefits, and alternatives were discussed. Will seek his insurance approval and have him return in follow up for any further discussion.

## 2024-04-11 NOTE — ANESTHESIA PREPROCEDURE EVALUATION
Procedure:  INSERTION UPPER AIRWAY STIMULATOR (Head)    Relevant Problems   ANESTHESIA (within normal limits)      CARDIO   (+) Mixed hyperlipidemia      /RENAL (within normal limits)      NEURO/PSYCH   (+) Anxiety   (+) Moderate episode of recurrent major depressive disorder (HCC)      PULMONARY   (+) Obstructive sleep apnea   (+) Obstructive sleep apnea syndrome      Behavioral Health   (+) Nicotine use disorder   (+) Tobacco dependence due to cigarettes      Other   (+) Complaint of nasal congestion        Physical Exam    Airway    Mallampati score: II         Dental        Cardiovascular  Cardiovascular exam normal    Pulmonary  Pulmonary exam normal Breath sounds clear to auscultation    Other Findings        Anesthesia Plan  ASA Score- 3     Anesthesia Type- general with ASA Monitors.         Additional Monitors:     Airway Plan: ETT.           Plan Factors-    Chart reviewed.   Existing labs reviewed. Patient summary reviewed.    Patient is not a current smoker.              Induction- intravenous.    Postoperative Plan-     Informed Consent- Anesthetic plan and risks discussed with patient.

## 2024-04-11 NOTE — DISCHARGE INSTR - AVS FIRST PAGE
Jose Tyson M.D.  Jw Hypoglossal Nerve Stimulator    Post-Operative Care  Office [phone]  Cell [phone]               At Home (in the days immediately following the procedure):   Try to sleep with your head elevated on 2-3 pillows   Iced packs placed over wound will help reduce swelling.  They should be used 20 minutes on/ 20 minutes off while awake for the first full day.  Crushed ice in ziplock bags or frozen peas or corn work well.    The dressings may be removed 1 day after surgery. There are small strips under the dressings. These should fall off on their own. If they do not, our office will remove them at follow up. If they fall off early, the wounds should be cleaned with a Q-tip soaked in hydrogen peroxide mixed 50/50 with water.  After removal apply antibiotic ointment (Bacitracin) or Vaseline to the external incisions 3-4 times per day.   Take your medicines as prescribed.  REMEMBER:  DO NOT DRIVE WHILE TAKING PAIN MEDICATIONS.   You should do neck rolls 10 times clockwise and 10 times counterclockwise directions for 1 week after surgery.    You may shower with luke-warm water only after the dressings are removed.    You may use ibuprofen (Motrin, Advil) and acetaminophen (Tyelnol) for pain control in addition to any prescribed pain medications.     You may get out of bed and go to the bathroom with assistance. Eat light, soft meals as tolerated, avoiding gas-stimulating foods.     Follow-up care:   Eat before coming to the office for post-operative visits.  Rest for the first week after the procedure, avoiding excessive physical activities, hard chewing, lifting objects over 8 lbs (about the weight of a phone book), or bending over. We request that you do not travel by plane for one week after surgery.     Follow-up visits:    At one week, any external sutures will be removed; you may drive yourself to this appointment (as long as you are no longer taking prescription/narcotic pain  medicines).  After dressing removal, make-up may be worn, avoiding the incision lines. Additional follow-up visits will be scheduled at this time.  Note that final results from the incisions may not be apparent until ONE YEAR after surgery.    Healing Care:   After surgery try not to roll onto the wound while asleep.  Clean the external skin gently but thoroughly with soap. The use of alcohol and tobacco products prolong swelling and healing and are best avoided for 2 weeks after surgery.   Do not expose your wound to sun for 4-6 weeks after surgery.  Use sunscreen (SPF 30 or higher) for 6 months after surgery if sun exposure is absolutely necessary.  Avoid any physical exercise that can cause over-heating or over-exertion for two weeks after the surgery.  Complete healing may take 12 months.  It is to your advantage to return for all postoperative visits so that long-term results may be evaluated.    Frequently Asked Questions:  When can I shower and shampoo my hair?   You may shower the day after your surgery, BUT KEEP ANY DRESSING DRY.  This may mean you wash your face/hair in the sink instead.  It is important that you do not use hot water, as this can increase the swelling.  Lukewarm water is best.      When will the swelling and bruising go away?   This usually takes 7-10 days or so, but may take less or more time, depending on the individual.    When can I take aspirin?   You should not take aspirin for 2 weeks prior to or after surgery. The same is true for vitamin E, ginko, garlic pills, and other “natural” supplements.    When can I take ibuprofen?      Non-steroidal anti-inflammatory drugs such as advil (ibuprofen), alleve (naproxen), or other similar may be used immediately after surgery as per the guidelines on the package.      When can I wear my glasses?   You will be able to wear contact lenses as soon as you feel comfortable.  You may wear glasses one to two weeks after surgery, depending on the  type of surgery you had.  In any case, you must be careful not to place any pressure on the wound.     When can I wear makeup?   Make-up can be applied after suture removal, but not directly on the incisions until 3 weeks after the procedure.    When will I activate my device?       We will wait for your healing to finish prior to activation which usually means a few weeks. The plan will be set up at your first follow up appointment at 1 week after the procedure.     What about exercise?   Please adhere to the following schedule:   Up to week 1 after surgery:  REST!  No strenuous exercise.  Walking is ok.  Week 1-3 after surgery:  You may begin light aerobic exercise, but no bending over/straining/lifting weights. You may begin some range of motion exercises of your shoulder.   Week 3+:  You may begin more strenuous exercise, such as yoga, stretching, bending over, lifting weights.  Please remember to start slowly.  Week 6+:  You may resume contact sports, such as soccer, basketball, etc    POST-OPERATIVE APPOINTMENTS:  1 week:  wound check in the office.   1 month: device activation and wound check in the office.  3 months: device titration sleep study at [facility].   4 months: final wound check in the office.   Yearly: device check at office.     How to contact us:    Phone:  If you have questions or concerns, please call us at [phone] during business hours (8 am to 5 pm).  On nights and weekends, you may page the ENT surgeon on call  at [facility].  In case of emergency, please call [phone].

## 2024-04-11 NOTE — ANESTHESIA POSTPROCEDURE EVALUATION
Post-Op Assessment Note    CV Status:  Stable    Pain management: adequate       Mental Status:  Alert and awake   Hydration Status:  Euvolemic   PONV Controlled:  Controlled   Airway Patency:  Patent     Post Op Vitals Reviewed: Yes    No anethesia notable event occurred.    Staff: Anesthesiologist               BP      Temp      Pulse     Resp      SpO2      /86   Pulse 72   Temp (!) 97.4 °F (36.3 °C)   Resp 20   Wt 87.4 kg (192 lb 10.9 oz)   SpO2 97%   BMI 29.30 kg/m²

## 2024-04-11 NOTE — OP NOTE
OPERATIVE REPORT  PATIENT NAME: Cleveland Serrano    :  1965  MRN: [id number]  Pt Location: [facility] OR ROOM 04    SURGERY DATE: 2024    Surgeons and Role:     * Jose Tyson MD - Primary     * Partha Caro MD - Assisting    Preop Diagnosis:  Obstructive sleep apnea (adult) (pediatric) [G47.33]    Post-Op Diagnosis Codes:     * Obstructive sleep apnea (adult) (pediatric) [G47.33]    Procedure(s):  INSERTION UPPER AIRWAY STIMULATOR    Specimen(s):  * No specimens in log *    Estimated Blood Loss:   Minimal    Drains:  * No LDAs found *    Anesthesia Type:   General    Operative Indications:  Obstructive sleep apnea (adult) (pediatric) [G47.33]  Bmi 29    Operative Findings:  Good stimulation at 0.7 V. No retraction at 0.4 V.     Complications:   None    Procedure and Technique:  Indications for procedure: Cleveland Serrano is a 59 y.o. male with a history of Moderate to Severe obstructive sleep apnea, who is intolerant and unable to achieve benefit from positive pressure therapy. Patient has passed the clinical, polysomnographic, and endoscopic screening criteria and presents today for the implant, whom I have seen in consultation for the above-listed procedure. After discussion of risks, benefits, and alternatives the patient elected to undergo the procedure and informed consent was obtained.      Procedure in detail: The patient was brought back to the operating room laid in the supine position and general endotracheal anesthesia was administered.  The patient was positioned appropriately.  Appropriate time-out was taken and procedure, sidedness, and marking was confirmed.  7 mL of 1% lidocaine with 1:100,000 epinephrine was infiltrated into the marked areas. Prior to prepping and draping, electrodes were placed in the genioglossus and styloglossus muscle and connected to the [facility] box for intraoperative nerve monitoring. The patient was prepped and draped in standard fashion.     Neuroplasty was performed as  follows: The lateral branches to retrusor muscles were identified, and tested intra-operatively using the NIM stimulator. The branches were identified and the inclusion branches were stimulated with both visual and neurostimulator confirmation. The branches were dissected in 360 degrees for 1.5 cm around the TV and C1 branches with care not to include the HG branches.      Insertion of an upper airway stimulator was performed as follows: The cuff electrode for the hypoglossal nerve stimulator was placed distally to these branches on the medial nerve branch to the genioglossus muscle. Diagnostic evaluation confirmed activation of the genioglossus nerve, resulting in genioglossal activation and tongue protrusion, confirmed visually.  The stimulation electrode was then looped under and secured to the digastric tendon on its lateral surface with the provided anchor.    Insertion of a thoracic sensor lead was performed as follows:  A second 5 cm incision was made in the right upper chest approximately 3 cm below the clavicle.  Dissection was carried down to the pectoralis muscle. An inferior pocket was created deep to the subcutaneous layer and superficial to the pectoralis muscle.   Dissection was carried down through pectoralis muscle using blunt dissection. The interspace between the 2nd and 3rd ribs were exposed. The external oblique muscles were identified, and bluntly dissected, and a tunnel was created between the external and internal intercostals in the 2nd intercostal space just on the superior aspect of the third rib. The pleural respiration sensor was placed into the pocket in the inferior aspect of the intercostal space.  The sensor was sutured to the fascia using the provided anchors to maintain the sensor facing the pleural space.   The stimulation lead was then tunneled in a subplatysmal plane and brought out into the sub-clavicular pocket.     Insertion of an upper airway stimulator was continued as  follows: Both the cuff electrode and the respiration sensing lead were connected to the implantable pulse generator.  Diagnostic evaluation was run, which confirmed a good respiration sensing signal as well as good tongue protrusion stimulation.      The implantable pulse generator was placed in the subclavicular pocket and secured loosely to the pectoralis fascia using 2-0 silk sutures.  All the wounds were thoroughly irrigated with irrigation.  The wounds were then closed in three layers with deep layers closed with 3-0 Vicryl and the skin closed with 4-0 Monocryl. Wound dressings were placed.     The patient was returned to the care of Anesthesia, extubated without difficulty, and taken to the recovery area in stable condition. All instruments and sponge counts were correct at the end of the procedure. I, Jose Tyson MD, was present for and performed all key elements of the procedure.     I was present for the entire procedure.    Patient Disposition:  PACU  and extubated and stable        SIGNATURE: Jose Tyson MD  DATE: April 11, 2024  TIME: 1:03 PM

## 2024-05-03 ENCOUNTER — ANESTHESIA EVENT (OUTPATIENT)
Dept: ANESTHESIOLOGY | Facility: HOSPITAL | Age: 59
End: 2024-05-03

## 2024-05-03 ENCOUNTER — ANESTHESIA (OUTPATIENT)
Dept: ANESTHESIOLOGY | Facility: HOSPITAL | Age: 59
End: 2024-05-03

## 2024-05-24 ENCOUNTER — ANESTHESIA (OUTPATIENT)
Dept: ANESTHESIOLOGY | Facility: HOSPITAL | Age: 59
End: 2024-05-24

## 2024-05-24 ENCOUNTER — ANESTHESIA EVENT (OUTPATIENT)
Dept: ANESTHESIOLOGY | Facility: HOSPITAL | Age: 59
End: 2024-05-24

## 2024-05-28 ENCOUNTER — TELEPHONE (OUTPATIENT)
Dept: GASTROENTEROLOGY | Facility: MEDICAL CENTER | Age: 59
End: 2024-05-28

## 2024-05-28 NOTE — TELEPHONE ENCOUNTER
Confirming Upcoming Procedure: Colonoscopy on June 7  Physician performing: Dr. Otoole  Location of procedure:  [facility]  Prep: Miralax

## 2024-06-03 NOTE — PSYCH
MEDICATION MANAGEMENT NOTE        [facility]      Name and Date of Birth:  Cleveland Serrano 59 y.o. 1965 MRN: [id number]    Date of Visit: June 4, 2024    Reason for Visit: Follow-up visit for medication management       SUBJECTIVE:    Cleveland Serrano is a 59 y.o. male with past psychiatric history significant for major depressive disorder and nicotine use disorder who was personally seen and evaluated today at the [facility] outpatient clinic for follow-up and medication management. Cleveland presents as pleasant and cooperative. His thoughts are linear and organized. He completes assessment without difficulty.     Cleveland endorses compliance with psychotropic medication regimen consisting of Prozac. He denies adverse medication side effects. Abilify was started last visit to assist with mood control and episodic irritability. Aakash did not tolerate it well and ultimately discontinued the agent. On his current dose of Prozac, Aakash endorses psychiatric stability. He denies new onset depressive concerns. His sleep is stable. He is recovery from Inspire implantation on 4/11/24. His energy and motivation are stable. Cleveland continues to report stable appetite. He denies SI/HI today. Cleveland is without anhedonia, crying spells, or hopelessness. He is future-oriented, discussing potential plans to travel to [place], [place] this summer. He will also spend father's day weekend with his son as they're hosting a surprise 30th birthday party. Cleveland is not tense, on-edge, or restless today. He reports baseline worry about household chores and work-related obligations but this does not consume him. His main stressor is his GF. He speaks at length regarding her antagonizing behaviors and ways she devalues and belittles him. Supportive therapy, CBT, and join problem solving utilized. He is only irritable and argumentative in her presence. He is never unruly or  "disruptive at work or with his neighbors. We discussed avenues for intervention today. Cleveland denies need for other psychotropic medications as without her, he is stable. He denies panic attacks. He rates his overall MH state as a \"9/10\" (10 being best, 0 being worst he ever felt). During today's examination, Cleveland does not exhibit objective evidence of misael/hypomania or psychosis. Cleveland is not currently irritable, grandiose, labile, or pathologically euphoric. He is not pressured in speech or impulsive. Cleveland is without perceptual disturbances, such as A/V hallucinations, paranoia, ideas of reference, or delusional beliefs. Cleveland denies recent ETOH or illicit substance abuse. Cleveland offers no further concerns.     Current Rating Scores:     None completed today.    Review Of Systems:      Constitutional negative and fluctuating energy level   ENT negative   Cardiovascular negative   Respiratory negative   Gastrointestinal negative   Genitourinary negative   Musculoskeletal negative   Integumentary negative   Neurological negative   Endocrine negative   Other Symptoms none, all other systems are negative       Past Psychiatric History: (unchanged information from previous note copied and italicized) - Information that is bolded has been updated.      Inpatient psychiatric admissions: Denies  Prior outpatient psychiatric linkage: Denies - received Sertraline from PCP  Past/current psychotherapy: Denies  History of suicidal attempts/gestures: Denies  History of violence/aggressive behaviors: was court-mandated to \"anger management\" classes - completed the 6 week program in July 2022  Psychotropic medication trials: Sertraline 100mg Daily (experiencing ED), Wellbutrin (over activated, more anxious), Trintellix, Prozac (now), Abilify (only took 5mg for several days)  Substance abuse inpatient/outpatient rehabilitation: Denies     Substance Abuse History: (unchanged information from previous note copied and " italicized) - Information that is bolded has been updated.      No history of ETOH or illict substance. He does endorse a use of tobacco abuse (previous 2pack/day smoker). No past legal actions or arrests secondary to substance intoxication. The patient denies prior DWIs/DUIs. No history of outpatient/inpatient rehabilitation programs. Cleveland does not exhibit objective evidence of substance withdrawal during today's examination nor does Cleveland appear under the influence of any psychoactive substance.          Social History: (unchanged information from previous note copied and italicized) - Information that is bolded has been updated.      Developmental: Denies a history of milestone/developmental delay. Denies a history of in-utero exposure to toxins/illicit substances. There is no documented history of IEP or need for special education.  Education: high school diploma/GED - graduated from [employer]  Marital history:  - He had 2 sons with his ex-wife. He is now involved in a new relationship.   Living arrangement, social support: significant other. He is still in contact with his younger son (30 years old)  Occupational History: Employed as a  - works 5 days per week (2 days, 2 nights, and 1 midday shift)  Access to firearms: Denies direct access to weapons/firearms. Cleveland Serrano has no history of arrests or violence with a deadly weapon.         Traumatic History: (unchanged information from previous note copied and italicized) - Information that is bolded has been updated.      Abuse:none is reported  Other Traumatic Events: Tragically lost his son to a MVA and his mother to cancer in 2018    Past Medical History:    Past Medical History:   Diagnosis Date    Anxiety     CPAP (continuous positive airway pressure) dependence     Depression     Sleep apnea         Past Surgical History:   Procedure Laterality Date    MULTIPLE TOOTH EXTRACTIONS      KS OPEN IMPLTJ HPGLSL NRV NSTIM RA  PG&RESPIR SENSOR N/A 4/11/2024    Procedure: INSERTION UPPER AIRWAY STIMULATOR;  Surgeon: Jose Tyson MD;  Location: [facility];  Service: ENT    NY PALATOPHARYNGOPLASTY N/A 4/7/2022    Procedure: UVULOPALATOPHARYNGOPLASTY (UPPP);  Surgeon: Jose Tyson MD;  Location: [facility];  Service: ENT    TESTICLE SURGERY       No Known Allergies    Substance Abuse History:    Social History     Substance and Sexual Activity   Alcohol Use Not Currently     Social History     Substance and Sexual Activity   Drug Use No       Social History:    Social History     Socioeconomic History    Marital status: Single     Spouse name: Not on file    Number of children: Not on file    Years of education: Not on file    Highest education level: Not on file   Occupational History    Not on file   Tobacco Use    Smoking status: Every Day     Current packs/day: 0.25     Average packs/day: 0.3 packs/day for 44.4 years (11.1 ttl pk-yrs)     Types: Cigarettes     Start date: 1980    Smokeless tobacco: Current   Vaping Use    Vaping status: Never Used   Substance and Sexual Activity    Alcohol use: Not Currently    Drug use: No    Sexual activity: Yes     Partners: Female   Other Topics Concern    Not on file   Social History Narrative    Not on file     Social Determinants of Health     Financial Resource Strain: Not on file   Food Insecurity: Not on file   Transportation Needs: Not on file   Physical Activity: Not on file   Stress: Not on file   Social Connections: Not on file   Intimate Partner Violence: Not on file   Housing Stability: Not on file       Family Psychiatric History:     Family History   Problem Relation Age of Onset    Anxiety disorder Father         Previously on Valium    Depression Father        History Review: The following portions of the patient's history were reviewed and updated as appropriate: allergies, current medications, past family history, past medical history, past social history, past surgical  history, and problem list.         OBJECTIVE:     Vital signs in last 24 hours:    There were no vitals filed for this visit.    Mental Status Evaluation:    Appearance age appropriate, casually dressed, dressed appropriately, looks stated age   Behavior pleasant, cooperative, calm, good eye contact   Speech normal rate, normal volume, normal pitch   Mood euthymic   Affect normal range and intensity, appropriate   Thought Processes organized, logical, goal directed, normal rate of thoughts, normal abstract reasoning   Associations intact associations   Thought Content no overt delusions   Perceptual Disturbances: no auditory hallucinations, no visual hallucinations   Abnormal Thoughts  Risk Potential Suicidal ideation - None at present  Homicidal ideation - None at present  Potential for aggression - No   Orientation oriented to person, place, time/date, and situation   Memory recent and remote memory grossly intact   Consciousness alert and awake   Attention Span Concentration Span attention span and concentration are age appropriate   Intellect appears to be of average intelligence   Insight intact and good   Judgement intact and good   Muscle Strength and  Gait normal gait and normal balance   Motor activity no abnormal movements   Language no difficulty naming common objects   Fund of Knowledge adequate knowledge of current events   Pain none   Pain Scale Did not ask patient to formally rate       Laboratory Results: I have personally reviewed all pertinent laboratory/tests results    Recent Labs (last 2 months):   No visits with results within 2 Month(s) from this visit.   Latest known visit with results is:   Appointment on 03/04/2024   Component Date Value    Ventricular Rate 03/04/2024 78     Atrial Rate 03/04/2024 78     CO Interval 03/04/2024 160     QRSD Interval 03/04/2024 84     QT Interval 03/04/2024 390     QTC Interval 03/04/2024 444     P Axis 03/04/2024 19     QRS [place] 03/04/2024 59     T Wave Axis  03/04/2024 55        Suicide/Homicide Risk Assessment:    The following interventions are recommended: no intervention changes needed      Lethality Statement:    Based on today's assessment and clinical criteria, Cleveland Serrano contracts for safety and is not an imminent risk of harm to self or others. Outpatient level of care is deemed appropriate at this current time. Cleveland understands that if they can no longer contract for safety, they need to call the office or report to their nearest Emergency Room for immediate evaluation.      Assessment/Plan:     Cleveland Serrano is a 59 y.o. male with past psychiatric history significant for major depressive disorder, complicated bereheavement, and nicotine use disorder who was personally seen and evaluated today at the [facility] outpatient clinic for follow-up and medication management. Cleveland endorses a neurotypical history without mood symptomatology or anxiety prior to 2020. He denies prior psychiatric treatment or counseling before 2018 and no previous medication trials. Several years ago, Cleveland's oldest son was killed in a MVA after hydroplaning during a wet evening. Approximately 3-6 months after his death, Cleveland's mother succumbed to a terminal illness (cancer). These two significant losses were the catalyst for new-onset depressive symptomatology. After 2018, Cleveland admits to episodic sadness and intermittent feelings of loss. These symptoms intensified since March/April 2020, likely secondary to COVID-19, mandatory quearentine, and subsequent disconnect from others. In June 2020, during an office visit with his PCP, Cleveland endorsed worsening depression and anxiety and was started on Sertraline which was titrated to 100mg Daily. He endorsed improvement in mood and resolution in daily anxiety, however, he was experiencing sexual dysfunction (erectile dysfunction) which was problematic. As such, he was tapered of Zoloft and Wellbutrin  "was started which was over-stimulating and induced anxiety. This agent was subsequently discontinued and Cleveland was started on Trintellix, which his insurance did not cover. Today, he remains on Prozac and is tolerating it well.     Today's Plan:     Psychopharmacologically, Cleveland reports benefit and tolerability with current regimen. He denies need for medication change or intervention.       DSM-V Diagnoses:      1.) Major Depressive Episode, recurrent, with anxious distress  2.) Nicotine Use Disorder        Treatment Recommendations/Precautions:     1.) Major Depressive Episode, recurrent, with anxious distress  - Hx of medication trials including: Sertraline (tolerated well but experience sexual dysfunction), Wellbutrin (too activating)  - Continue Prozac 60mg Daily  - Discussed referral for psychotherapy - given packet in past given extensive wait list [facility]      2.) Nicotine Use Disorder  - States that he previously smoked 2 packs per day - he is now smoking less frequently - states today that he can go \"4 days without a cigarette\"  - Completed Rx Chantix by PCP- in January 2022  - Completed hypnosis   - Offered trial of Wellbutrin (this time with SR version rather than XL version) which would be appropriate and likely beneficial for mood, smoking, and struggles with ED, which are not entirely related to SSRI use  - Psychoeducation provided regarding the importance of exercise and healthy dietary choices and their impact on mood, energy, and motivation  - Counseled to avoid ETOH, illict substances, and nicotine secondary to the detrimental effects of these substances on mental and physical health      Medication management every 4 months  Aware of need to follow up with family physician for medical issues  Aware of 24 hour and weekend coverage for urgent situations accessed by calling [facility] main practice number    Medications Risks/Benefits      Risks, Benefits And Possible " Side Effects Of Medications:    Risks, benefits, and possible side effects of medications explained to Cleveland including risk of suicidality and serotonin syndrome related to treatment with antidepressants. He verbalizes understanding and agreement for treatment.    Controlled Medication Discussion:     Not applicable    Psychotherapy Provided:     Individual psychotherapy provided: Yes  Counseling was provided during the session today for 17 minutes.  Medications, treatment progress and treatment plan reviewed with Cleveland.  Medication education provided to Cleveland.  Recent stressors discussed with Cleveland including family problems, family conflict, family issues, relationship problems, job stress, stress at work, health issues, recent medication change, everyday stressors, and occasional anxiety.  Coping strategies reviewed with Cleveland.   Educated on importance of medication and treatment compliance.  Supportive therapy provided.   Cognitive therapy was utilized during the session.     Treatment Plan:    Completed and signed during the session: Yes - with [place]      Visit Time    Visit Start Time: 11:30 AM  Visit Stop Time: 12:00 PM  Total Visit Duration:  30 minutes     The total visit duration detailed above includes: patient engagement, medication management, psychotherapy/counseling, discussion regarding treatment goals, documentation, review of past medical records, and coordination of care.      David Pringle MD  Board Certified Diplomate of the American Board of Psychiatry and Neurology  06/04/24

## 2024-06-04 ENCOUNTER — OFFICE VISIT (OUTPATIENT)
Dept: PSYCHIATRY | Facility: CLINIC | Age: 59
End: 2024-06-04
Payer: COMMERCIAL

## 2024-06-04 DIAGNOSIS — F33.41 RECURRENT MAJOR DEPRESSIVE DISORDER, IN PARTIAL REMISSION (HCC): Primary | ICD-10-CM

## 2024-06-04 DIAGNOSIS — R45.86 MOOD SWINGS: ICD-10-CM

## 2024-06-04 PROCEDURE — 90833 PSYTX W PT W E/M 30 MIN: CPT | Performed by: STUDENT IN AN ORGANIZED HEALTH CARE EDUCATION/TRAINING PROGRAM

## 2024-06-04 PROCEDURE — 99213 OFFICE O/P EST LOW 20 MIN: CPT | Performed by: STUDENT IN AN ORGANIZED HEALTH CARE EDUCATION/TRAINING PROGRAM

## 2024-06-04 RX ORDER — FLUOXETINE HYDROCHLORIDE 40 MG/1
40 CAPSULE ORAL DAILY
Qty: 90 CAPSULE | Refills: 3 | Status: SHIPPED | OUTPATIENT
Start: 2024-06-04

## 2024-06-04 RX ORDER — FLUOXETINE HYDROCHLORIDE 20 MG/1
20 CAPSULE ORAL DAILY
Qty: 90 CAPSULE | Refills: 3 | Status: SHIPPED | OUTPATIENT
Start: 2024-06-04

## 2024-06-04 NOTE — BH TREATMENT PLAN
TREATMENT PLAN (Medication Management Only)        [facility]      Name and Date of Birth:  Cleveland Serrano 59 y.o. 1965 MRN: [id number]    Date of Visit: June 4, 2024    Diagnosis/Diagnoses:    1. Recurrent major depressive disorder, in partial remission (HCC)  FLUoxetine (PROzac) 20 mg capsule    FLUoxetine (PROzac) 40 MG capsule      2. Mood swings            Strengths/Personal Resources for Self-Care: supportive family, supportive friends, taking medications as prescribed, ability to adapt to life changes, ability to communicate needs, ability to communicate well, ability to listen, ability to reason, ability to understand psychiatric illness, average or above intelligence, general fund of knowledge, good physical health, good understanding of illness, independence, motivation for treatment, ability to negotiate basic needs, willingness to work on problems.    Area/Areas of need (in own words): mood swings  1. Long Term Goal: maintain control of mood.  Target Date:6 months - 12/4/2024  Person/Persons responsible for completion of goal: Cleveland  2.  Short Term Objective (s) - How will we reach this goal?:   A. Provider new recommended medication/dosage changes and/or continue medication(s): continue current medications as prescribed.  B. Keep regularly scheduled psychiatric appointments  C. Maintain adherence to psychotropic medication regimen   D. Set limits with girlfriend and her antagonizing nature of behavior  E. Maintain appropriate dietary intake  F. Practice adequate sleep hygiene      Target Date:6 months - 12/4/2024  Person/Persons Responsible for Completion of Goal: Cleveland    Progress Towards Goals: continuing treatment    Treatment Modality: medication management every 4 months    Review due 180 days from date of this plan: 6 months - 12/4/2024  Expected length of service: ongoing treatment    My Physician/PA/NP and I have developed this plan  together and I agree to work on the goals and objectives. I understand the treatment goals that were developed for my treatment. Treatment Plan was completed with Cleveland's assistance and input throughout today's session. Cleveland consented to the information provided and documented above. Unfortunately, treatment plan was not signed at the time of this office visit secondary to issues related to signature keypad.

## 2024-06-25 ENCOUNTER — ANESTHESIA EVENT (OUTPATIENT)
Dept: ANESTHESIOLOGY | Facility: HOSPITAL | Age: 59
End: 2024-06-25

## 2024-06-25 ENCOUNTER — ANESTHESIA (OUTPATIENT)
Dept: ANESTHESIOLOGY | Facility: HOSPITAL | Age: 59
End: 2024-06-25

## 2024-07-02 PROBLEM — Z45.42 ENCOUNTER FOR ADJUSTMENT AND MANAGEMENT OF NEUROSTIMULATOR: Status: ACTIVE | Noted: 2022-01-10

## 2024-07-02 PROBLEM — D50.9 IRON DEFICIENCY ANEMIA: Status: ACTIVE | Noted: 2024-07-02

## 2024-07-08 ENCOUNTER — TELEPHONE (OUTPATIENT)
Age: 59
End: 2024-07-08

## 2024-07-08 NOTE — TELEPHONE ENCOUNTER
Scheduled date of colonoscopy (as of today): 08/20/2024  Physician performing colonoscopy: DR RIVERA  Location of colonoscopy: [facility]  Bowel prep reviewed with patient: MIRALAX/DULCOLAX  Instructions reviewed with patient by: Previously reviewed with pt. Verified pt has all information.  Clearances: n/a

## 2024-07-10 ENCOUNTER — APPOINTMENT (OUTPATIENT)
Dept: LAB | Facility: CLINIC | Age: 59
End: 2024-07-10
Payer: COMMERCIAL

## 2024-07-10 DIAGNOSIS — D50.9 IRON DEFICIENCY ANEMIA, UNSPECIFIED IRON DEFICIENCY ANEMIA TYPE: ICD-10-CM

## 2024-07-10 DIAGNOSIS — E78.2 MIXED HYPERLIPIDEMIA: ICD-10-CM

## 2024-07-10 DIAGNOSIS — R73.9 HYPERGLYCEMIA: ICD-10-CM

## 2024-07-10 LAB — FERRITIN SERPL-MCNC: 98 NG/ML (ref 24–336)

## 2024-07-10 PROCEDURE — 82728 ASSAY OF FERRITIN: CPT

## 2024-07-10 PROCEDURE — 36415 COLL VENOUS BLD VENIPUNCTURE: CPT

## 2024-08-06 ENCOUNTER — TELEPHONE (OUTPATIENT)
Dept: GASTROENTEROLOGY | Facility: MEDICAL CENTER | Age: 59
End: 2024-08-06

## 2024-08-06 ENCOUNTER — ANESTHESIA EVENT (OUTPATIENT)
Dept: ANESTHESIOLOGY | Facility: HOSPITAL | Age: 59
End: 2024-08-06

## 2024-08-06 ENCOUNTER — ANESTHESIA (OUTPATIENT)
Dept: ANESTHESIOLOGY | Facility: HOSPITAL | Age: 59
End: 2024-08-06

## 2024-08-06 NOTE — TELEPHONE ENCOUNTER
Confirming Upcoming Procedure: Colonoscopy on August 20  Physician performing: Dr. Otoole  Location of procedure:  [facility]  Prep: Miralax    Diabetic: N/A

## 2024-08-28 ENCOUNTER — ANESTHESIA (OUTPATIENT)
Dept: ANESTHESIOLOGY | Facility: HOSPITAL | Age: 59
End: 2024-08-28

## 2024-08-28 ENCOUNTER — ANESTHESIA EVENT (OUTPATIENT)
Dept: ANESTHESIOLOGY | Facility: HOSPITAL | Age: 59
End: 2024-08-28

## 2024-09-03 ENCOUNTER — APPOINTMENT (OUTPATIENT)
Dept: LAB | Facility: CLINIC | Age: 59
End: 2024-09-03
Payer: COMMERCIAL

## 2024-09-03 LAB
ALBUMIN SERPL BCG-MCNC: 4.1 G/DL (ref 3.5–5)
ALP SERPL-CCNC: 82 U/L (ref 34–104)
ALT SERPL W P-5'-P-CCNC: 16 U/L (ref 7–52)
ANION GAP SERPL CALCULATED.3IONS-SCNC: 14 MMOL/L (ref 4–13)
AST SERPL W P-5'-P-CCNC: 12 U/L (ref 13–39)
BASOPHILS # BLD AUTO: 0.06 THOUSANDS/ÂΜL (ref 0–0.1)
BASOPHILS NFR BLD AUTO: 1 % (ref 0–1)
BILIRUB SERPL-MCNC: 0.41 MG/DL (ref 0.2–1)
BUN SERPL-MCNC: 12 MG/DL (ref 5–25)
CALCIUM SERPL-MCNC: 9.2 MG/DL (ref 8.4–10.2)
CHLORIDE SERPL-SCNC: 100 MMOL/L (ref 96–108)
CHOLEST SERPL-MCNC: 268 MG/DL
CO2 SERPL-SCNC: 22 MMOL/L (ref 21–32)
CREAT SERPL-MCNC: 1.02 MG/DL (ref 0.6–1.3)
EOSINOPHIL # BLD AUTO: 0.25 THOUSAND/ÂΜL (ref 0–0.61)
EOSINOPHIL NFR BLD AUTO: 2 % (ref 0–6)
ERYTHROCYTE [DISTWIDTH] IN BLOOD BY AUTOMATED COUNT: 12.7 % (ref 11.6–15.1)
EST. AVERAGE GLUCOSE BLD GHB EST-MCNC: 160 MG/DL
GFR SERPL CREATININE-BSD FRML MDRD: 79 ML/MIN/1.73SQ M
GLUCOSE P FAST SERPL-MCNC: 127 MG/DL (ref 65–99)
HBA1C MFR BLD: 7.2 %
HCT VFR BLD AUTO: 50.4 % (ref 36.5–49.3)
HDLC SERPL-MCNC: 37 MG/DL
HGB BLD-MCNC: 16.5 G/DL (ref 12–17)
IMM GRANULOCYTES # BLD AUTO: 0.09 THOUSAND/UL (ref 0–0.2)
IMM GRANULOCYTES NFR BLD AUTO: 1 % (ref 0–2)
LDLC SERPL CALC-MCNC: 196 MG/DL (ref 0–100)
LYMPHOCYTES # BLD AUTO: 2.34 THOUSANDS/ÂΜL (ref 0.6–4.47)
LYMPHOCYTES NFR BLD AUTO: 18 % (ref 14–44)
MCH RBC QN AUTO: 30.4 PG (ref 26.8–34.3)
MCHC RBC AUTO-ENTMCNC: 32.7 G/DL (ref 31.4–37.4)
MCV RBC AUTO: 93 FL (ref 82–98)
MONOCYTES # BLD AUTO: 0.79 THOUSAND/ÂΜL (ref 0.17–1.22)
MONOCYTES NFR BLD AUTO: 6 % (ref 4–12)
NEUTROPHILS # BLD AUTO: 9.64 THOUSANDS/ÂΜL (ref 1.85–7.62)
NEUTS SEG NFR BLD AUTO: 72 % (ref 43–75)
NRBC BLD AUTO-RTO: 0 /100 WBCS
PLATELET # BLD AUTO: 397 THOUSANDS/UL (ref 149–390)
PMV BLD AUTO: 8.7 FL (ref 8.9–12.7)
POTASSIUM SERPL-SCNC: 4.2 MMOL/L (ref 3.5–5.3)
PROT SERPL-MCNC: 6.6 G/DL (ref 6.4–8.4)
RBC # BLD AUTO: 5.42 MILLION/UL (ref 3.88–5.62)
SODIUM SERPL-SCNC: 136 MMOL/L (ref 135–147)
TRIGL SERPL-MCNC: 173 MG/DL
TSH SERPL DL<=0.05 MIU/L-ACNC: 0.73 UIU/ML (ref 0.45–4.5)
WBC # BLD AUTO: 13.17 THOUSAND/UL (ref 4.31–10.16)

## 2024-09-04 ENCOUNTER — OFFICE VISIT (OUTPATIENT)
Dept: FAMILY MEDICINE CLINIC | Facility: CLINIC | Age: 59
End: 2024-09-04
Payer: COMMERCIAL

## 2024-09-04 ENCOUNTER — TELEPHONE (OUTPATIENT)
Age: 59
End: 2024-09-04

## 2024-09-04 VITALS
BODY MASS INDEX: 30.25 KG/M2 | HEIGHT: 68 IN | OXYGEN SATURATION: 98 % | HEART RATE: 88 BPM | SYSTOLIC BLOOD PRESSURE: 120 MMHG | RESPIRATION RATE: 16 BRPM | WEIGHT: 199.6 LBS | DIASTOLIC BLOOD PRESSURE: 76 MMHG | TEMPERATURE: 98.8 F

## 2024-09-04 DIAGNOSIS — D50.8 OTHER IRON DEFICIENCY ANEMIA: ICD-10-CM

## 2024-09-04 DIAGNOSIS — F41.9 ANXIETY: ICD-10-CM

## 2024-09-04 DIAGNOSIS — E11.69 TYPE 2 DIABETES MELLITUS WITH OTHER SPECIFIED COMPLICATION, WITHOUT LONG-TERM CURRENT USE OF INSULIN (HCC): Primary | ICD-10-CM

## 2024-09-04 DIAGNOSIS — G47.33 OBSTRUCTIVE SLEEP APNEA: ICD-10-CM

## 2024-09-04 DIAGNOSIS — E78.2 MIXED HYPERLIPIDEMIA: ICD-10-CM

## 2024-09-04 PROBLEM — E11.9 DIABETES MELLITUS (HCC): Status: ACTIVE | Noted: 2024-09-04

## 2024-09-04 PROCEDURE — 99214 OFFICE O/P EST MOD 30 MIN: CPT | Performed by: FAMILY MEDICINE

## 2024-09-04 PROCEDURE — 3725F SCREEN DEPRESSION PERFORMED: CPT | Performed by: FAMILY MEDICINE

## 2024-09-04 RX ORDER — ROSUVASTATIN CALCIUM 20 MG/1
20 TABLET, COATED ORAL DAILY
Qty: 90 TABLET | Refills: 1 | Status: SHIPPED | OUTPATIENT
Start: 2024-09-04

## 2024-09-04 NOTE — ASSESSMENT & PLAN NOTE
Not well-controlled.  I am going to start him on Crestor 20 mg daily.  Discussed with possible side effects.  Come back in 2 to 3 months with blood work.

## 2024-09-04 NOTE — TELEPHONE ENCOUNTER
PA for Rybelsus 7MG  APPROVED     Date(s) approved 08/05/2024-09/05/2025    Case #[id number]    Patient advised by          [x][employer] Message  [x]Phone call   [x]LMOM  []L/M to call office as no active Communication consent on file  []Unable to leave detailed message as VM not approved on Communication consent       Pharmacy advised by    [x]Fax  []Phone call    Approval letter scanned into [employer]

## 2024-09-04 NOTE — ASSESSMENT & PLAN NOTE
Newly diagnosed diabetes mellitus type 2.  I am going to start him on Rybelsus.  He was given sample for 3 mg daily and I sent prescription for 7 mg daily.  He was told to follow low-carb diet and regular exercise.  It was discussed about side effect of the medication.  Come back in 2 to 3 months.  Lab Results   Component Value Date    HGBA1C 7.2 (H) 09/03/2024

## 2024-09-04 NOTE — PROGRESS NOTES
Ambulatory Visit  Name: Cleveland Serrano      : 1965      MRN: [id number]  Encounter Provider: Dg Blackman MD  Encounter Date: 2024   Encounter department: [facility]    Assessment & Plan   1. Type 2 diabetes mellitus with other specified complication, without long-term current use of insulin (HCC)  Assessment & Plan:  Newly diagnosed diabetes mellitus type 2.  I am going to start him on Rybelsus.  He was given sample for 3 mg daily and I sent prescription for 7 mg daily.  He was told to follow low-carb diet and regular exercise.  It was discussed about side effect of the medication.  Come back in 2 to 3 months.  Lab Results   Component Value Date    HGBA1C 7.2 (H) 2024     Orders:  -     semaglutide (Rybelsus) 7 MG tablet; Take 1 tablet (7 mg total) by mouth daily before breakfast  -     Albumin / creatinine urine ratio; Future; Expected date: 2024  -     Comprehensive metabolic panel; Future; Expected date: 2024  -     Hemoglobin A1C; Future; Expected date: 2024  -     CBC and differential; Future; Expected date: 2024  -     Lipid Panel with Direct LDL reflex; Future; Expected date: 2024  -     TSH, 3rd generation with Free T4 reflex; Future; Expected date: 2024  2. Mixed hyperlipidemia  Assessment & Plan:  Not well-controlled.  I am going to start him on Crestor 20 mg daily.  Discussed with possible side effects.  Come back in 2 to 3 months with blood work.  Orders:  -     rosuvastatin (CRESTOR) 20 MG tablet; Take 1 tablet (20 mg total) by mouth daily  3. Other iron deficiency anemia  Assessment & Plan:  Stable.  Continue to monitor CBC.  4. Anxiety  Assessment & Plan:  Stable.  Continue to follow-up with psychiatrist.  5. Obstructive sleep apnea  Assessment & Plan:  Stable.  Continue on CPAP .         History of Present Illness     Is here today for follow-up multiple medical problems.  He has blood work done showed A1c increased  to 7.  2.  Also elevated cholesterol.  He denies any complaint today.    Hyperlipidemia  Pertinent negatives include no chest pain or shortness of breath.     Review of Systems   Constitutional:  Negative for chills and fever.   HENT:  Negative for trouble swallowing.    Eyes:  Negative for visual disturbance.   Respiratory:  Negative for cough and shortness of breath.    Cardiovascular:  Negative for chest pain and palpitations.   Gastrointestinal:  Negative for abdominal pain, blood in stool and vomiting.   Endocrine: Negative for cold intolerance and heat intolerance.   Genitourinary:  Negative for difficulty urinating and dysuria.   Musculoskeletal:  Negative for gait problem.   Skin:  Negative for rash.   Neurological:  Negative for dizziness, syncope and headaches.   Hematological:  Negative for adenopathy.   Psychiatric/Behavioral:  Negative for behavioral problems.      Past Medical History:   Diagnosis Date   • Anxiety    • CPAP (continuous positive airway pressure) dependence    • Depression    • Sleep apnea      Past Surgical History:   Procedure Laterality Date   • MULTIPLE TOOTH EXTRACTIONS     • OH OPEN IMPLTJ HPGLSL NRV NSTIM RA PG&RESPIR SENSOR N/A 4/11/2024    Procedure: INSERTION UPPER AIRWAY STIMULATOR;  Surgeon: Jose Tyson MD;  Location: [facility];  Service: ENT   • OH PALATOPHARYNGOPLASTY N/A 4/7/2022    Procedure: UVULOPALATOPHARYNGOPLASTY (UPPP);  Surgeon: Jose Tyson MD;  Location: [facility];  Service: ENT   • TESTICLE SURGERY       Family History   Problem Relation Age of Onset   • Anxiety disorder Father         Previously on Valium   • Depression Father      Social History     Tobacco Use   • Smoking status: Every Day     Current packs/day: 0.25     Average packs/day: 0.3 packs/day for 44.7 years (11.2 ttl pk-yrs)     Types: Cigarettes     Start date: 1980   • Smokeless tobacco: Current   Vaping Use   • Vaping status: Never Used   Substance and Sexual Activity   • Alcohol use:  "Not Currently   • Drug use: No   • Sexual activity: Yes     Partners: Female     Current Outpatient Medications on File Prior to Visit   Medication Sig   • FLUoxetine (PROzac) 20 mg capsule Take 1 capsule (20 mg total) by mouth daily   • FLUoxetine (PROzac) 40 MG capsule Take 1 capsule (40 mg total) by mouth daily   • fluticasone (FLONASE) 50 mcg/act nasal spray 2 sprays into each nostril daily   • sildenafil (VIAGRA) 100 mg tablet Take 1 tablet (100 mg total) by mouth daily as needed for erectile dysfunction     No Known Allergies  Immunization History   Administered Date(s) Administered   • COVID-19 MODERNA VACC 0.5 ML IM 04/05/2021, 05/03/2021, 11/10/2021   • COVID-19 Pfizer Vac BIVALENT Kirill-sucrose 12 Yr+ IM 11/29/2022   • INFLUENZA 10/19/2020, 01/10/2023   • Influenza, recombinant, quadrivalent,injectable, preservative free 10/19/2020, 01/10/2023     Objective     /76 (BP Location: Left arm, Patient Position: Sitting, Cuff Size: Large)   Pulse 88   Temp 98.8 °F (37.1 °C) (Tympanic)   Resp 16   Ht 5' 8\" (1.727 m)   Wt 90.5 kg (199 lb 9.6 oz)   SpO2 98%   BMI 30.35 kg/m²     Physical Exam  Vitals and nursing note reviewed.   Constitutional:       Appearance: He is well-developed.   HENT:      Head: Normocephalic and atraumatic.   Eyes:      Pupils: Pupils are equal, round, and reactive to light.   Cardiovascular:      Rate and Rhythm: Normal rate and regular rhythm.      Heart sounds: Normal heart sounds.   Pulmonary:      Effort: Pulmonary effort is normal.      Breath sounds: Normal breath sounds.   Abdominal:      General: Bowel sounds are normal.      Palpations: Abdomen is soft.   Musculoskeletal:      Cervical back: Normal range of motion and neck supple.   Lymphadenopathy:      Cervical: No cervical adenopathy.   Skin:     General: Skin is warm.      Findings: No rash.   Neurological:      Mental Status: He is alert and oriented to person, place, and time.         "

## 2024-09-06 ENCOUNTER — TELEPHONE (OUTPATIENT)
Dept: GASTROENTEROLOGY | Facility: MEDICAL CENTER | Age: 59
End: 2024-09-06

## 2024-09-06 NOTE — TELEPHONE ENCOUNTER
Confirming Upcoming Procedure: Colonoscopy on September 11  Physician performing: Dr. Otoole  Location of procedure:  [facility]  Prep: Miralax  Diabetic: no

## 2024-09-09 ENCOUNTER — TELEPHONE (OUTPATIENT)
Dept: GASTROENTEROLOGY | Facility: MEDICAL CENTER | Age: 59
End: 2024-09-09

## 2024-09-11 ENCOUNTER — ANESTHESIA (OUTPATIENT)
Dept: GASTROENTEROLOGY | Facility: MEDICAL CENTER | Age: 59
End: 2024-09-11
Payer: COMMERCIAL

## 2024-09-11 ENCOUNTER — HOSPITAL ENCOUNTER (OUTPATIENT)
Dept: GASTROENTEROLOGY | Facility: MEDICAL CENTER | Age: 59
Setting detail: OUTPATIENT SURGERY
Discharge: HOME | End: 2024-09-11
Payer: COMMERCIAL

## 2024-09-11 ENCOUNTER — ANESTHESIA EVENT (OUTPATIENT)
Dept: GASTROENTEROLOGY | Facility: MEDICAL CENTER | Age: 59
End: 2024-09-11
Payer: COMMERCIAL

## 2024-09-11 VITALS
HEIGHT: 68 IN | WEIGHT: 199 LBS | RESPIRATION RATE: 18 BRPM | BODY MASS INDEX: 30.16 KG/M2 | HEART RATE: 78 BPM | TEMPERATURE: 96.8 F | DIASTOLIC BLOOD PRESSURE: 74 MMHG | SYSTOLIC BLOOD PRESSURE: 124 MMHG | OXYGEN SATURATION: 100 %

## 2024-09-11 DIAGNOSIS — Z12.11 SCREENING FOR COLON CANCER: ICD-10-CM

## 2024-09-11 DIAGNOSIS — K62.9 ANAL LESION: Primary | ICD-10-CM

## 2024-09-11 PROCEDURE — 88305 TISSUE EXAM BY PATHOLOGIST: CPT | Performed by: PATHOLOGY

## 2024-09-11 PROCEDURE — 45380 COLONOSCOPY AND BIOPSY: CPT | Performed by: STUDENT IN AN ORGANIZED HEALTH CARE EDUCATION/TRAINING PROGRAM

## 2024-09-11 PROCEDURE — 45385 COLONOSCOPY W/LESION REMOVAL: CPT | Performed by: STUDENT IN AN ORGANIZED HEALTH CARE EDUCATION/TRAINING PROGRAM

## 2024-09-11 RX ORDER — LIDOCAINE HYDROCHLORIDE 20 MG/ML
INJECTION, SOLUTION EPIDURAL; INFILTRATION; INTRACAUDAL; PERINEURAL AS NEEDED
Status: DISCONTINUED | OUTPATIENT
Start: 2024-09-11 | End: 2024-09-11

## 2024-09-11 RX ORDER — SODIUM CHLORIDE 9 MG/ML
125 INJECTION, SOLUTION INTRAVENOUS CONTINUOUS
Status: CANCELLED | OUTPATIENT
Start: 2024-09-11

## 2024-09-11 RX ORDER — PROPOFOL 10 MG/ML
INJECTION, EMULSION INTRAVENOUS CONTINUOUS PRN
Status: DISCONTINUED | OUTPATIENT
Start: 2024-09-11 | End: 2024-09-11

## 2024-09-11 RX ORDER — PROPOFOL 10 MG/ML
INJECTION, EMULSION INTRAVENOUS AS NEEDED
Status: DISCONTINUED | OUTPATIENT
Start: 2024-09-11 | End: 2024-09-11

## 2024-09-11 RX ORDER — SODIUM CHLORIDE 9 MG/ML
125 INJECTION, SOLUTION INTRAVENOUS CONTINUOUS
Status: DISCONTINUED | OUTPATIENT
Start: 2024-09-11 | End: 2024-09-15 | Stop reason: HOSPADM

## 2024-09-11 RX ADMIN — PROPOFOL 100 MCG/KG/MIN: 10 INJECTION, EMULSION INTRAVENOUS at 09:37

## 2024-09-11 RX ADMIN — Medication 40 MG: at 09:44

## 2024-09-11 RX ADMIN — PROPOFOL 100 MG: 10 INJECTION, EMULSION INTRAVENOUS at 09:35

## 2024-09-11 RX ADMIN — LIDOCAINE HYDROCHLORIDE 100 MG: 20 INJECTION, SOLUTION EPIDURAL; INFILTRATION; INTRACAUDAL at 09:35

## 2024-09-11 RX ADMIN — SODIUM CHLORIDE 125 ML/HR: 0.9 INJECTION, SOLUTION INTRAVENOUS at 09:15

## 2024-09-11 NOTE — ANESTHESIA PREPROCEDURE EVALUATION
Procedure:  COLONOSCOPY    Relevant Problems   CARDIO   (+) Mixed hyperlipidemia      HEMATOLOGY   (+) Iron deficiency anemia      NEURO/PSYCH   (+) Anxiety   (+) Recurrent major depressive disorder, in partial remission (HCC)      PULMONARY   (+) CECILIA (obstructive sleep apnea)   (+) Obstructive sleep apnea      Behavioral Health   (+) Tobacco dependence due to cigarettes        Physical Exam    Airway    Mallampati score: II  TM Distance: >3 FB  Neck ROM: full     Dental    upper dentures    Cardiovascular  Rhythm: regular, Rate: normal, Cardiovascular exam normal    Pulmonary   Breath sounds clear to auscultation    Other Findings        Anesthesia Plan  ASA Score- 2     Anesthesia Type- IV sedation with anesthesia with ASA Monitors.         Additional Monitors:     Airway Plan:            Plan Factors-    Chart reviewed.    Patient summary reviewed.                  Induction-     Postoperative Plan-         Informed Consent- Anesthetic plan and risks discussed with patient.

## 2024-09-11 NOTE — ANESTHESIA POSTPROCEDURE EVALUATION
Post-Op Assessment Note    CV Status:  Stable  Pain Score: 0    Pain management: adequate       Mental Status:  Sleepy and arousable   Hydration Status:  Euvolemic   PONV Controlled:  Controlled   Airway Patency:  Patent     Post Op Vitals Reviewed: Yes    No anethesia notable event occurred.    Staff: Anesthesiologist, CRNA               /63 (09/11/24 1006)    Temp      Pulse 73 (09/11/24 1006)   Resp 18 (09/11/24 1006)    SpO2 97 % (09/11/24 1006)

## 2024-09-11 NOTE — H&P
History and Physical - [facility]  Cleveland Serrano 59 y.o. male MRN: [id number]          HPI: Cleveland Serrano is a 59 y.o. year old male who presents for open access screening colonoscopy.    No family history of colon cancer.    REVIEW OF SYSTEMS: Per the HPI, and otherwise unremarkable.    Historical Information   Past Medical History:   Diagnosis Date    Anxiety     CPAP (continuous positive airway pressure) dependence     Depression     Sleep apnea      Past Surgical History:   Procedure Laterality Date    MULTIPLE TOOTH EXTRACTIONS      PA OPEN IMPLTJ HPGLSL NRV NSTIM RA PG&RESPIR SENSOR N/A 4/11/2024    Procedure: INSERTION UPPER AIRWAY STIMULATOR;  Surgeon: Jose Tyson MD;  Location: [facility];  Service: ENT    PA PALATOPHARYNGOPLASTY N/A 4/7/2022    Procedure: UVULOPALATOPHARYNGOPLASTY (UPPP);  Surgeon: Jose Tyson MD;  Location[facility];  Service: ENT    TESTICLE SURGERY       Social History   Social History     Substance and Sexual Activity   Alcohol Use Not Currently     Social History     Substance and Sexual Activity   Drug Use No     Social History     Tobacco Use   Smoking Status Every Day    Current packs/day: 0.25    Average packs/day: 0.2 packs/day for 44.7 years (11.2 ttl pk-yrs)    Types: Cigarettes    Start date: 1980   Smokeless Tobacco Current     Family History   Problem Relation Age of Onset    Anxiety disorder Father         Previously on Valium    Depression Father        Meds/Allergies       Current Outpatient Medications:     FLUoxetine (PROzac) 20 mg capsule    FLUoxetine (PROzac) 40 MG capsule    fluticasone (FLONASE) 50 mcg/act nasal spray    rosuvastatin (CRESTOR) 20 MG tablet    semaglutide (Rybelsus) 7 MG tablet    sildenafil (VIAGRA) 100 mg tablet    No Known Allergies    Objective     There were no vitals taken for this visit.      PHYSICAL EXAM    GEN: NAD  CARDIO: RRR  PULM: CTA bilaterally  ABD: soft, non-tender, non-distended  EXT: no lower  extremity edema  NEURO: AAOx3      ASSESSMENT/PLAN:  59 y.o. year old male here for colonoscopy; he is stable and optimized for his procedure.

## 2024-09-17 PROCEDURE — 88305 TISSUE EXAM BY PATHOLOGIST: CPT | Performed by: PATHOLOGY

## 2024-09-20 NOTE — TELEPHONE ENCOUNTER
Patients GI provider:  Dr. Liliana Cruz    Number to return call: [phone]    Reason for call: Perianal and anorectal growths    Scheduled procedure/appointment date if applicable: Appt 10/16/24

## 2024-10-07 NOTE — PSYCH
"MEDICATION MANAGEMENT NOTE        [facility]      Name and Date of Birth:  Cleveland Srerano 59 y.o. 1965 MRN: [id number]    Date of Visit: October 8, 2024    Reason for Visit: Follow-up visit for medication management       SUBJECTIVE:    Cleveland Serrano is a 59 y.o. male with past psychiatric history significant for major depressive disorder, complicated bereheavement, and nicotine use disorder who was personally seen and evaluated today at the [facility] outpatient clinic for follow-up and medication management. Cleveland presents as pleasant and cooperative. His thoughts are linear and organized. Aakash completes assessment without difficulty.    Cleveland endorses compliance with psychotropic medication regimen consisting of Prozac. He denies adverse medication concerns. Cleveland endorses a challenging 4 months since last visit. His GF remains antagonistic. This was once again processed at length. Both supportive therapy and joint problem solving utilized. We discussed managing his reaction to her behavior and avoiding attempts at changing her behavior, as she lacks insight into her disease process and is help rejecting (regarding MH treatment). Over the last few months, Cleveland's found that his frustration tolerance has been worse. He is more reactive regarding his GF's threats and has been tense, on-edge, and labile. He reports mood swings as of late and periods of dysphoria. Fortunately, his sleep and appetite are stable. He had the inspire procedure in April and finds that his sleep is now more restorative. As such, his energy and motivation are \"fine\". He denies SI/HI. His concentration and focus are erratic at times secondary to his work schedule. He denies pathologic anhedonia, crying spells or hopelessness. He does report some feelings of apathy secondary to the way his GF makes him feel. Cleveland currently endorses occasional and " "appropriate anxiety that is not pathologic in nature. Cleveland denies recent periods of extreme or excessive nervousness, irrational worry, or overt anxiousness. Cleveland denies new-onset panic symptomatology or maladaptive behaviors. Throughout today's session, Cleveland does not appear visibly perturbed. Acutely, Cleveland does not exhibit objective evidence of misael/hypomania or psychosis. Cleveland is not currently grandiose, loud, or pathologically impulsive. Cleveland is without perceptual disturbances, such as A/V hallucinations, paranoia, ideas of reference, or delusional beliefs. Cleveland denies recent ETOH or illicit substance abuse. He continues to use nicotine products daily and does not voice interest in cessation. Cleveland offers no further concerns.     Current Rating Scores:     None completed today.    Review Of Systems:      Constitutional negative   ENT negative   Cardiovascular negative   Respiratory negative   Gastrointestinal negative   Genitourinary negative   Musculoskeletal negative   Integumentary negative   Neurological negative   Endocrine negative   Other Symptoms none, all other systems are negative       Past Psychiatric History: (unchanged information from previous note copied and italicized) - Information that is bolded has been updated.      Inpatient psychiatric admissions: Denies  Prior outpatient psychiatric linkage: Denies - received Sertraline from PCP  Past/current psychotherapy: Denies  History of suicidal attempts/gestures: Denies  History of violence/aggressive behaviors: was court-mandated to \"anger management\" classes - completed the 6 week program in July 2022  Psychotropic medication trials: Sertraline 100mg Daily (experiencing ED), Wellbutrin (over activated, more anxious), Trintellix, Prozac (now), Abilify (only took 5mg for several days)  Substance abuse inpatient/outpatient rehabilitation: Denies     Substance Abuse History: (unchanged information from previous note copied and " italicized) - Information that is bolded has been updated.      No history of ETOH or illict substance. He does endorse a use of tobacco abuse (previous 2pack/day smoker). No past legal actions or arrests secondary to substance intoxication. The patient denies prior DWIs/DUIs. No history of outpatient/inpatient rehabilitation programs. Cleveland does not exhibit objective evidence of substance withdrawal during today's examination nor does Cleveland appear under the influence of any psychoactive substance.          Social History: (unchanged information from previous note copied and italicized) - Information that is bolded has been updated.      Developmental: Denies a history of milestone/developmental delay. Denies a history of in-utero exposure to toxins/illicit substances. There is no documented history of IEP or need for special education.  Education: high school diploma/GED - graduated from [employer]  Marital history:  - He had 2 sons with his ex-wife. He is now involved in a new relationship.   Living arrangement, social support: significant other. He is still in contact with his younger son (30 years old)  Occupational History: Employed as a  - works 5 days per week (2 days, 2 nights, and 1 midday shift)  Access to firearms: Denies direct access to weapons/firearms. Cleveland Serrano has no history of arrests or violence with a deadly weapon.         Traumatic History: (unchanged information from previous note copied and italicized) - Information that is bolded has been updated.      Abuse:none is reported  Other Traumatic Events: Tragically lost his son to a MVA and his mother to cancer in 2018    Past Medical History:    Past Medical History:   Diagnosis Date    Anxiety     CPAP (continuous positive airway pressure) dependence     Depression     Sleep apnea         Past Surgical History:   Procedure Laterality Date    MULTIPLE TOOTH EXTRACTIONS      ND OPEN IMPLTJ HPGLSL NRV NSTIM RA  PG&RESPIR SENSOR N/A 4/11/2024    Procedure: INSERTION UPPER AIRWAY STIMULATOR;  Surgeon: Jose Tyson MD;  Location: [facility];  Service: ENT    NM PALATOPHARYNGOPLASTY N/A 4/7/2022    Procedure: UVULOPALATOPHARYNGOPLASTY (UPPP);  Surgeon: Jose Tyson MD;  Location: [facility];  Service: ENT    TESTICLE SURGERY       No Known Allergies    Substance Abuse History:    Social History     Substance and Sexual Activity   Alcohol Use Not Currently     Social History     Substance and Sexual Activity   Drug Use No       Social History:    Social History     Socioeconomic History    Marital status: Single     Spouse name: Not on file    Number of children: Not on file    Years of education: Not on file    Highest education level: Not on file   Occupational History    Not on file   Tobacco Use    Smoking status: Every Day     Current packs/day: 0.25     Average packs/day: 0.2 packs/day for 44.8 years (11.2 ttl pk-yrs)     Types: Cigarettes     Start date: 1980    Smokeless tobacco: Current   Vaping Use    Vaping status: Never Used   Substance and Sexual Activity    Alcohol use: Not Currently    Drug use: No    Sexual activity: Yes     Partners: Female   Other Topics Concern    Not on file   Social History Narrative    Not on file     Social Determinants of Health     Financial Resource Strain: Not on file   Food Insecurity: Not on file   Transportation Needs: Not on file   Physical Activity: Not on file   Stress: Not on file   Social Connections: Not on file   Intimate Partner Violence: Not on file   Housing Stability: Not on file       Family Psychiatric History:     Family History   Problem Relation Age of Onset    Anxiety disorder Father         Previously on Valium    Depression Father        History Review: The following portions of the patient's history were reviewed and updated as appropriate: allergies, current medications, past family history, past medical history, past social history, past surgical  history, and problem list.         OBJECTIVE:     Vital signs in last 24 hours:    There were no vitals filed for this visit.    Mental Status Evaluation:    Appearance age appropriate, casually dressed, looks stated age, poor dentition    Behavior pleasant, cooperative, good eye contact   Speech normal rate, normal volume, normal pitch   Mood dysphoric   Affect normal range and intensity, appropriate   Thought Processes organized, coherent, goal directed   Associations intact associations   Thought Content no overt delusions   Perceptual Disturbances: no auditory hallucinations, no visual hallucinations   Abnormal Thoughts  Risk Potential Suicidal ideation - None at present  Homicidal ideation - None at present  Potential for aggression - No   Orientation oriented to person, place, time/date, and situation   Memory recent and remote memory grossly intact   Consciousness alert and awake   Attention Span Concentration Span attention span and concentration are age appropriate   Intellect appears to be of average intelligence   Insight intact and good   Judgement intact and good   Muscle Strength and  Gait normal gait and normal balance   Motor activity no abnormal movements   Language no difficulty naming common objects   Fund of Knowledge adequate knowledge of current events   Pain none   Pain Scale Did not ask patient to formally rate       Laboratory Results: I have personally reviewed all pertinent laboratory/tests results    Recent Labs (last 2 months):   Hospital Outpatient Visit on 09/11/2024   Component Date Value    Case Report 09/11/2024                      Value:Surgical Pathology Report                         Case: [id number]                                  Authorizing Provider:  Jackson Otoole MD       Collected:           09/11/2024 0941              Ordering Location:     [facility]        Received:            09/11/2024 [address] [place] [address] Endoscopy                                                      Pathologist:           Kaila Jerez MD                                                             Specimens:   A) - Colon, cold snare hepatic flexure polyp x 1                                                    B) - Colon, cold snare ascending colon polyp x 1                                                    C) - Colon, cold snare transverse colon polyp x 1                                                   D) - Colon, cold snare descending colon polyp x 1                                                   E) - Colon, cold bx anal lesion at anal verge                                              Final Diagnosis 09/11/2024                      Value:A. Colon, cold snare hepatic flexure polyp x 1:  - Fragments of tubular adenoma, with no high grade dysplasia or carcinoma     B. Colon, cold snare ascending colon polyp x 1:  - Fragments of tubular adenoma, with no high grade dysplasia or carcinoma     C. Colon, cold snare transverse colon polyp x 1:  - Tubular adenoma, with no high grade dysplasia or carcinoma     D. Colon, cold snare descending colon polyp x 1:  - Tubular adenoma, with no high grade dysplasia or carcinoma     E. Colon, cold bx anal lesion at anal verge:  - Fragments of squamous mucosa with viral cytopathic effect, consistent with condylomatous change  - No high-grade dysplasia seen        Additional Information 09/11/2024                      Value:All reported additional testing was performed with appropriately reactive controls.  These tests were developed and their performance characteristics determined by [facility] or appropriate performing facility, though some tests may be performed on tissues which have not been validated for performance characteristics (such as staining performed on alcohol exposed cell blocks and decalcified tissues).  Results should be interpreted with caution and in the context of the patients’ clinical  "condition. These tests may not be cleared or approved by the U.S. Food and Drug Administration, though the FDA has determined that such clearance or approval is not necessary. These tests are used for clinical purposes and they should not be regarded as investigational or for research. This laboratory has been approved by CLIA 88, designated as a high-complexity laboratory and is qualified to perform these tests.  .      Synoptic Checklist 09/11/2024                      Value:                            COLON/RECTUM POLYP FORM - GI - All Specimens                                                                                     :    Adenoma(s)      Gross Description 09/11/2024                      Value:A. The specimen is received in formalin, labeled with the patient's name and hospital number, and is designated \" hepatic flexure polyp x 1\".  The specimen consists of 2 tan soft tissue fragments measuring 0.5 and 1.0 cm in greatest dimension.  Entirely submitted. Screened cassette.  B. The specimen is received in formalin, labeled with the patient's name and hospital number, and is designated \" ascending colon polyp x 1\".  The specimen consists of multiple tan-orange soft tissue fragments versus presumed food particles measuring in aggregate of 0.8 x 0.3 x 0.2 cm.  Entirely submitted. Screened cassette.  C. The specimen is received in formalin, labeled with the patient's name and hospital number, and is designated \" transverse colon polyp x 1\".  The specimen consists of a single tan polyp measuring 0.5 x 0.4 x 0.3 cm.  Entirely submitted. Screened cassette.  D. The specimen is received in formalin, labeled with the patient's name and hospital number, and is designated \" descending                           colon polyp x 1\".  The specimen consists of a single tan soft tissue fragment measuring 0.5 cm in greatest dimension.  Entirely submitted. Screened cassette.  E. The specimen is received in formalin, labeled " "with the patient's name and hospital number, and is designated \" colon cold biopsy, anal lesion at anal verge\".  The specimen is strained into an embedding bag and consists of multiple white soft tissue fragments measuring in aggregate of 1.2 x 0.2 x 0.1 cm.  Entirely submitted. Screened cassette.    Note: The estimated total formalin fixation time based upon information provided by the submitting clinician and the standard processing schedule is under 72 hours.    -[facility]         Suicide/Homicide Risk Assessment:    The following interventions are recommended: no intervention changes needed      Lethality Statement:    Based on today's assessment and clinical criteria, Cleveland Serrano contracts for safety and is not an imminent risk of harm to self or others. Outpatient level of care is deemed appropriate at this current time. Cleveland understands that if they can no longer contract for safety, they need to call the office or report to their nearest Emergency Room for immediate evaluation.      Assessment/Plan:     Cleveland Serrano is a 59 y.o. male with past psychiatric history significant for major depressive disorder, complicated bereheavement, and nicotine use disorder who was personally seen and evaluated today at the [facility] outpatient clinic for follow-up and medication management. Cleveland endorses a neurotypical history without mood symptomatology or anxiety prior to 2020. He denies prior psychiatric treatment or counseling before 2018 and no previous medication trials. Several years ago, Cleveland's oldest son was killed in a MVA after hydroplaning during a wet evening. Approximately 3-6 months after his death, Cleveland's mother succumbed to a terminal illness (cancer). These two significant losses were the catalyst for new-onset depressive symptomatology. After 2018, Cleveland admits to episodic sadness and intermittent feelings of loss. These symptoms intensified since March/April 2020, " likely secondary to COVID-19, mandatory quearentine, and subsequent disconnect from others. In June 2020, during an office visit with his PCP, Cleveland endorsed worsening depression and anxiety and was started on Sertraline which was titrated to 100mg Daily. He endorsed improvement in mood and resolution in daily anxiety, however, he was experiencing sexual dysfunction (erectile dysfunction) which was problematic. As such, he was tapered of Zoloft and Wellbutrin was started which was over-stimulating and induced anxiety. This agent was subsequently discontinued and Cleveland was started on Trintellix, which his insurance did not cover. Today, he remains on Prozac and is tolerating it well.     Today's Plan:     Psychopharmacologically, Cleveland reports benefit and tolerability associated with current regimen. Over the last few months, Cleveland's found that his frustration tolerance has been worse. He is more reactive regarding his GF's threats and has been tense, on-edge, and labile. He reports mood swings as of late and periods of dysphoria. In an attempt to better control these concerns, he was agreeable to augmentation strategy. Historically, use of Wellbutrin as augmenting agent was not tolerated, nor was neuroleptic (Abilify) medication. As such, will trial Lamictal for depression and mood stability. Risks/benefits/alternatives to treatment discussed, including a myriad of potential adverse medication side effects, to which Cleveland voiced understanding and consented fully to treatment.      DSM-V Diagnoses:      1.) Major Depressive Episode, recurrent, with anxious distress  2.) Nicotine Use Disorder  3.) Mood Swings - NEW        Treatment Recommendations/Precautions:     1.) Major Depressive Episode, recurrent, with anxious distress  - Hx of medication trials including: Sertraline (tolerated well but experience sexual dysfunction), Wellbutrin (too activating)  - Continue Prozac 60mg Daily  - Start Lamictal 25mg  "Daily for 2 weeks - optimize to 50mg Daily after 2 weeks - patient aware this is off-label treatment yet evidenced based  - Discussed referral for psychotherapy - given packet in past given extensive wait list at [facility]      2.) Nicotine Use Disorder  - States that he previously smoked 2 packs per day - he is now smoking less frequently - states today that he can go \"4 days without a cigarette\"  - Completed Rx Chantix by PCP- in January 2022  - Completed hypnosis   - Offered trial of Wellbutrin (this time with SR version rather than XL version) which would be appropriate and likely beneficial for mood, smoking, and struggles with ED, which are not entirely related to SSRI use  - Psychoeducation provided regarding the importance of exercise and healthy dietary choices and their impact on mood, energy, and motivation  - Counseled to avoid ETOH, illict substances, and nicotine secondary to the detrimental effects of these substances on mental and physical health    3.) Mood Swings  - Start Lamictal 25mg Daily for 2 weeks - optimize to 50mg Daily after 2 weeks - patient aware this is off-label treatment yet evidenced based        Aware of need to follow up with family physician for medical issues  Aware of 24 hour and weekend coverage for urgent situations accessed by calling [facility] main practice number    Medications Risks/Benefits      Risks, Benefits And Possible Side Effects Of Medications:    Risks, benefits, and possible side effects of medications explained to Cleveland including risk of rash related to treatment with Lamictal and risk of suicidality and serotonin syndrome related to treatment with antidepressants. He verbalizes understanding and agreement for treatment.    Controlled Medication Discussion:     Not applicable    Psychotherapy Provided:     Individual psychotherapy provided: Yes  Counseling was provided during the session today for 16 minutes.  Medications, treatment " progress and treatment plan reviewed with Cleveland.  Medication changes discussed with Cleveland.  Medication education provided to Cleveland.  Educated on importance of medication and treatment compliance.  Supportive therapy provided.      Treatment Plan:    Completed and signed during the session: Yes - with Cleveland      Visit Time    Visit Start Time: 1:00 PM  Visit Stop Time: 1:30 PM  Total Visit Duration:  30 minutes     The total visit duration detailed above includes: patient engagement, medication management, psychotherapy/counseling, discussion regarding treatment goals, documentation, review of past medical records, and coordination of care.      Note Share Disclaimer:     This note was not shared with the patient due to reasonable likelihood of causing patient harm      David Pringle MD  Board Certified Diplomate of the American Board of Psychiatry and Neurology  10/08/24

## 2024-10-08 ENCOUNTER — OFFICE VISIT (OUTPATIENT)
Dept: PSYCHIATRY | Facility: CLINIC | Age: 59
End: 2024-10-08
Payer: COMMERCIAL

## 2024-10-08 DIAGNOSIS — F41.9 ANXIETY: ICD-10-CM

## 2024-10-08 DIAGNOSIS — F33.0 MILD EPISODE OF RECURRENT MAJOR DEPRESSIVE DISORDER (HCC): Primary | ICD-10-CM

## 2024-10-08 DIAGNOSIS — R45.86 MOOD SWINGS: ICD-10-CM

## 2024-10-08 DIAGNOSIS — F17.200 NICOTINE USE DISORDER: ICD-10-CM

## 2024-10-08 PROCEDURE — 99214 OFFICE O/P EST MOD 30 MIN: CPT | Performed by: STUDENT IN AN ORGANIZED HEALTH CARE EDUCATION/TRAINING PROGRAM

## 2024-10-08 PROCEDURE — 90833 PSYTX W PT W E/M 30 MIN: CPT | Performed by: STUDENT IN AN ORGANIZED HEALTH CARE EDUCATION/TRAINING PROGRAM

## 2024-10-08 RX ORDER — LAMOTRIGINE 25 MG/1
TABLET ORAL
Qty: 90 TABLET | Refills: 1 | Status: SHIPPED | OUTPATIENT
Start: 2024-10-08 | End: 2024-11-21

## 2024-10-08 NOTE — BH TREATMENT PLAN
TREATMENT PLAN (Medication Management Only)        [facility]      Name and Date of Birth:  Cleveland Serrano 59 y.o. 1965 MRN: [id number]    Date of Visit: October 8, 2024    Diagnosis/Diagnoses:    1. Mood swings  lamoTRIgine (LaMICtal) 25 mg tablet      2. Mild episode of recurrent major depressive disorder (HCC)  lamoTRIgine (LaMICtal) 25 mg tablet      3. Anxiety        4. Nicotine use disorder            Strengths/Personal Resources for Self-Care: supportive family, supportive friends, taking medications as prescribed, ability to adapt to life changes, ability to communicate needs, ability to communicate well, general fund of knowledge, good physical health, good understanding of illness, ability to negotiate basic needs.    Area/Areas of need (in own words): mood instability    1. Long Term Goal: improve control of mood.  Target Date:6 months - 4/8/2025  Person/Persons responsible for completion of goal: Cleveland    2.  Short Term Objective (s) - How will we reach this goal?:   A. Provider new recommended medication/dosage changes and/or continue medication(s): continue current medications as prescribed.  B. Keep regularly scheduled psychiatric appointments  C. Maintain adherence to psychotropic medication regimen   D. Set limits with GF  E. Maintain appropriate dietary intake  F. Practice adequate sleep hygiene      Target Date:6 months - 4/8/2025  Person/Persons Responsible for Completion of Goal: Cleveland    Progress Towards Goals: continuing treatment    Treatment Modality: medication management every 3 months    Review due 180 days from date of this plan: 6 months - 4/8/2025  Expected length of service: ongoing treatment    My Physician/PA/NP and I have developed this plan together and I agree to work on the goals and objectives. I understand the treatment goals that were developed for my treatment. Treatment Plan was completed with Cleveland's assistance and  input throughout today's session. Cleveland consented to the information provided and documented above. Unfortunately, treatment plan was not physically signed at the time of this office visit secondary to time constraints and issues related to signature keypad. Again, Cleveland did verbally consent.

## 2024-10-15 ENCOUNTER — TELEPHONE (OUTPATIENT)
Age: 59
End: 2024-10-15

## 2024-10-17 NOTE — PROGRESS NOTES
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 and the need for surgical excision to halt progression of disease  We also discussed risk of recurrence after surgical excision  Patient is agreeable to proceed with surgery  Because of his anal canal lesion, he is not a candidate for topical Aldara, although if he has skin recurrences, he may be a candidate for imiquimod in the future  We will schedule him for surgery, and he will follow-up 3 to 4 weeks postop  Orders:    Case request operating room: EXCISION CONDYLOMA ANAL/RECTAL; Standing    Case request operating room: EXCISION CONDYLOMA ANAL/RECTAL    Anal lesion    Orders:    Ambulatory Referral to Colorectal Surgery      History of Present Illness     Cleveland Serrano is a 59 y.o. male who presents today for evaluation of condyloma. He offers no complaints today.     His most recent colonoscopy was on 24 with Dr. Otoole which revealed:  4 subcentimeter polyps were removed with cold snare  Hemorrhoids  Polypoid mass measuring 1.5 cm x 2 cm in the rectum, distal rectum and anal region; performed cold forceps biopsy  Verrucous growths in the perianal area  Pathology: 4 tubular adenomas, biopsy at anal verge - consistent with condylomatous changes.     He denies a family history of colorectal cancer.     History obtained from : patient  Review of Systems   Constitutional:  Negative for chills and fever.   HENT:  Negative for ear pain and sore throat.    Eyes:  Negative for pain and visual disturbance.   Respiratory:  Negative for cough and shortness of breath.    Cardiovascular:  Negative for chest pain and palpitations.   Gastrointestinal:  Negative for abdominal pain and vomiting.   Genitourinary:   Negative for dysuria and hematuria.   Musculoskeletal:  Negative for arthralgias and back pain.   Skin:  Negative for color change and rash.   Neurological:  Negative for seizures and syncope.   All other systems reviewed and are negative.    Past Medical History   Past Medical History:   Diagnosis Date    Anxiety     CPAP (continuous positive airway pressure) dependence     Depression     Sleep apnea      Past Surgical History:   Procedure Laterality Date    MULTIPLE TOOTH EXTRACTIONS      PA OPEN IMPLTJ HPGLSL NRV NSTIM RA PG&RESPIR SENSOR N/A 4/11/2024    Procedure: INSERTION UPPER AIRWAY STIMULATOR;  Surgeon: Jose Tyson MD;  Location: [facility];  Service: ENT    PA PALATOPHARYNGOPLASTY N/A 4/7/2022    Procedure: UVULOPALATOPHARYNGOPLASTY (UPPP);  Surgeon: Jose Tyson MD;  Location: [facility];  Service: ENT    TESTICLE SURGERY       Family History   Problem Relation Age of Onset    Anxiety disorder Father         Previously on Valium    Depression Father      Current Outpatient Medications on File Prior to Visit   Medication Sig Dispense Refill    FLUoxetine (PROzac) 20 mg capsule Take 1 capsule (20 mg total) by mouth daily 90 capsule 3    FLUoxetine (PROzac) 40 MG capsule Take 1 capsule (40 mg total) by mouth daily 90 capsule 3    fluticasone (FLONASE) 50 mcg/act nasal spray 2 sprays into each nostril daily (Patient not taking: Reported on 9/11/2024) 16 g 0    lamoTRIgine (LaMICtal) 25 mg tablet Take 1 tablet (25 mg total) by mouth daily for 14 days, THEN 2 tablets (50 mg total) daily. 90 tablet 1    rosuvastatin (CRESTOR) 20 MG tablet Take 1 tablet (20 mg total) by mouth daily 90 tablet 1    semaglutide (Rybelsus) 7 MG tablet Take 1 tablet (7 mg total) by mouth daily before breakfast (Patient not taking: Reported on 9/11/2024) 90 tablet 1    sildenafil (VIAGRA) 100 mg tablet Take 1 tablet (100 mg total) by mouth daily as needed for erectile dysfunction 30 tablet 2     No current  facility-administered medications on file prior to visit.   No Known Allergies       Objective     There were no vitals taken for this visit.    Physical Exam  Vitals and nursing note reviewed.   Constitutional:       General: He is not in acute distress.     Appearance: He is well-developed.   HENT:      Head: Normocephalic and atraumatic.   Eyes:      Conjunctiva/sclera: Conjunctivae normal.   Cardiovascular:      Rate and Rhythm: Normal rate and regular rhythm.      Heart sounds: No murmur heard.  Pulmonary:      Effort: Pulmonary effort is normal. No respiratory distress.      Breath sounds: Normal breath sounds.   Abdominal:      Palpations: Abdomen is soft.      Tenderness: There is no abdominal tenderness.   Genitourinary:     Comments: Right-sided perianal and buttock condyloma without bleeding  Strong tone on digital anorectal exam with palpable condyloma of anal canal  Anoscopy deferred due to patient discomfort  Musculoskeletal:         General: No swelling.      Cervical back: Neck supple.   Skin:     General: Skin is warm and dry.      Capillary Refill: Capillary refill takes less than 2 seconds.   Neurological:      Mental Status: He is alert.   Psychiatric:         Mood and Affect: Mood normal.       Administrative Statements   I have spent a total time of 30 minutes in caring for this patient on the day of the visit/encounter including Diagnostic results, Prognosis, Risks and benefits of tx options, Instructions for management, Patient and family education, Importance of tx compliance, Risk factor reductions, Impressions, Counseling / Coordination of care, Documenting in the medical record, Reviewing / ordering tests, medicine, procedures  , Obtaining or reviewing history  , and Communicating with other healthcare professionals .

## 2024-10-21 ENCOUNTER — OFFICE VISIT (OUTPATIENT)
Age: 59
End: 2024-10-21
Payer: COMMERCIAL

## 2024-10-21 ENCOUNTER — TELEPHONE (OUTPATIENT)
Age: 59
End: 2024-10-21

## 2024-10-21 VITALS — HEIGHT: 68 IN | BODY MASS INDEX: 30.62 KG/M2 | WEIGHT: 202 LBS

## 2024-10-21 DIAGNOSIS — K62.9 ANAL LESION: ICD-10-CM

## 2024-10-21 DIAGNOSIS — A63.0 CONDYLOMA: Primary | ICD-10-CM

## 2024-10-21 PROCEDURE — 99203 OFFICE O/P NEW LOW 30 MIN: CPT | Performed by: SURGERY

## 2024-10-21 NOTE — LETTER
Cleveland Serrano  [address]  [place] [address]        ------------------------------------------------------------------------------------------------------------  10/21/2024    Dear Cleveland Serrano,    Your surgery is scheduled for: 11/8/2024  The hospital will call the evening prior to your surgery with your expected arrival time.   Location:[address] [address]    CHECK LIST PRIOR TO OUTPATIENT SURGERY  It is your responsibility to obtain any/all referrals needed for your surgery if required by your insurance. Our office will contact you to discuss your insurance coverage for this procedure.     Special instructions required if you are taking any blood thinners. Please verify with the prescribing physician. Examples include Coumadin, Plavix, Xarelto, Eliquis, Pradaxa, etc.     Please check with your family physician if you are taking the following medications: Aspirin or any Aspirin containing medication, Gingko biloba, Ginseng, Feverfew, and/or Leon’s Wort. We suggest stopping these for 3 days.     The night before and the day of your surgery, wash from your neck to groin with chlorhexidine soap. This soap is available at most retail pharmacies under such brand names as Hibiclens, Endure or Aplicare.     Pre-admission testing Required: YES      NO x       NOTHING TO EAT OR DRINK AFTER MIDNIGHT PRIOR TO SURGERY.     Take ONE FLEET ENEMA one hour prior to leaving for the hospital.     Please do not hesitate to call our office with any questions regarding your surgery.                        Post-Operative Care Information   Hemorrhoidectomy, EUA, Fissurectomy, Fistulotomy, Sphincterotomy      Resume high fiber diet. Fiber supplementation with Metamucil or Konsyl also recommended daily.       Your first bowel movement may take up to 3 days after surgery. THIS IS OFTEN PAINFUL.      While taking narcotic pain medication, you should remain on an  over-the-counter stool softener such as Colace (one tablet twice daily). For constipation Miralax can be taken up to three times daily.     Pain is to be expected after hemorrhoid surgery. Ibuprofen (400? 600MG) every 6?8 hours is an excellent alternative in addition or as a substitute to your narcotic pain medication.     Rectal bleeding or drainage is normal after surgery.       Nausea is a common complaint post op. This can be associated with the narcotic pain medications, anesthesia as well as with severe constipation.     Driving may be resumed when all off all narcotic pain medications and you can turn or twist your body without hesitation.    If you are unable to urinate within 8 hours after surgery, please call the office at [phone]    Frequent warm tub soaks or warm showers are often soothing, we suggest 3 to 4 times daily.    After bowel movements, you may wash with a hand shower or warm tub soak.  No soap is okay.     No strenuous activity (i.e., Running, jogging, swimming, or weightlifting) for up to one week after surgery.     When will I receive follow-up care?      You should be scheduled for a post-op appointment within 6-8 weeks after surgery, unless otherwise instructed on your discharge summary. If you do not have an existing appointment at the time of discharge, please call our office at [phone].    Call the office at [phone] immediately if you have a fever, chills, excessive sweating, difficulty urinating, or excessive/profuse bleeding with clots.         Medicine Instructions for Adults with Diabetes who Need a Bowel Prep       Follow these instructions when a BOWEL PREP is required for your procedure or surgery!    NOTE:   GLP-1 Agonists taken weekly: do not take in the 7 days before your procedure   SGLT-2 Inhibitors: do not take in the 4 days before your procedure     On the Day Before Surgery/Procedure  If you are having a procedure (e.g. Colonoscopy) or surgery that requires  a bowel prep and you may have at least a clear liquid diet, follow the directions below based on the type of medicine you take for your diabetes.     Type of Medicine You Take Examples What to do   Pre-Mixed Insulin - Intermediate Acting Humalog® 75/25, Humulin® 70/30, Novolog® 70/30, Regular Insulin Take ½ your regular dose the evening before your procedure   Rapid/Fast Acting Insulin Humalog® U200, NovoLog®, Apidra®, Fiasp® Take ½ your regular dose the evening before your procedure.   Long-Acting Insulin Lantus®, Levemir®, Tresiba®, Toujeo®, Basaglar® Take your FULL regular dose the day before procedure   Oral Sulfonylurea Glipizide/Glimepiride/Glucotrol® Take ½ your regular dose the evening before your procedure   Other Oral Diabetes Medicines Metformin®, Glucophage®, Glucophage XR®, Riomet®, Glumetza®), Actose®, Avandia®, Glyset®, Prandin® Take your regular dose with dinner in the evening before your procedure   GLP-1 Agonists AdlyxinÒ, ByettaÒ, BydureonÒ, OzempicÒ, SoliquaÒ, TanzeumÒ, TrulicityÒ, VictozaÒ, Saxenda®, Rybelsus® If taken daily, take as normal    If taken weekly, do not take this medicine for 7 days before your procedure including the day of the procedure (resume taking after the procedure)   SGLT-2 Inhibitors Jardiance®, Invokana®, Farxiga®,   Steglatro®, Brenzavvy®, Qtern®, Segluromet®, Glyxambi®, Synjardy®, Synjardy XR®, Invokamet®, Invokamet XR®, Trijary XR®, Xigduo XR®, Steglujan® Do not take for 4 days before your procedure including the day of the procedure (resume taking after the procedure)                More information continued on back                    Medicine Instructions for Adults with Diabetes who Need a Bowel Prep  Page 2      On the Day of Surgery/Procedure  Follow the directions below based on the type of medicine you take for your diabetes.     Type of Medicine You Take Examples What to do   Long-Acting Insulin Lantus®, Levemir®, Tresiba®, Toujeo®, Basaglar®, Semglee®   If  you usually take your Long-Acting Insulin in the morning, take the full dose as scheduled.   GLP-1 Agonists AdlyxinÒ, ByettaÒ, BydureonÒ, OzempicÒ, SoliquaÒ, TanzeumÒ, TrulicityÒ, VictozaÒ, Saxenda®, Rybelsus® Do NOT take this medicine on the day of your procedure (resume taking after the procedure)       On the Day of Surgery/Procedure (continued)  Except for the morning Long-Acting Insulin, DO NOT take ANY diabetic medicine on the day of your procedure unless you were instructed by the doctor who manages your diabetes medicines.    Continue to check your blood sugars.  If you have an insulin pump, ask your endocrinologist for instructions at least 3 days before your procedure. NOTE: If you are not able to ask your endocrinologist in advance, on the day of the procedure set your insulin pump to your basal rate only. Bring your insulin pump supplies to the hospital.     If you have any questions about taking your diabetes medicines prior to your procedure, please contact the doctor who manages your diabetes medicines.

## 2024-10-21 NOTE — TELEPHONE ENCOUNTER
Patient scheduled for surgery 11/8/2024  at [facility] . Instructions and PAT's gone over with and handed to the patient.

## 2024-10-22 ENCOUNTER — HOSPITAL ENCOUNTER (OUTPATIENT)
Dept: SLEEP CENTER | Facility: CLINIC | Age: 59
Discharge: HOME | End: 2024-10-22
Payer: COMMERCIAL

## 2024-10-22 PROCEDURE — 95810 POLYSOM 6/> YRS 4/> PARAM: CPT

## 2024-10-22 PROCEDURE — 95977 ALYS CPLX CN NPGT PRGRMG: CPT

## 2024-10-23 NOTE — PROGRESS NOTES
Sleep Study Documentation    Pre-Sleep Study       Sleep testing procedure explained to patient:YES    Patient napped prior to study:YES- more than 30 minutes. Napped after 2PM: yes    Caffeine:Dayshift worker after 12PM.  Caffeine use:YES- coffee  6 ounces and soda  12 ounces    Alcohol:Dayshift workers after 5PM: Alcohol use:NO    Typical day for patient:YES       Study Documentation    Sleep Study Indications: Witnessed apnea, unrefreshing sleep    Sleep Study: Treatment   Optimal INSPIRE amplitude: To be determined, patient continued showing respiratory events by the end of the study, 4 to 5 AM. Last amplitude was 1.7 V.     Snore:Eliminated  REM Obtained:yes  Supplemental O2: no    Minimum SaO2 85%  Baseline SaO2 96%    Minimum SaO2 at final INSPIRE amplitude 90%  Mode of Therapy: INSPIRE   ETCO2:No    Mode of Therapy:INSPIRE     EKG abnormalities: no     EEG abnormalities: no    Were abnormal behaviors in sleep observed:NO    Is Total Sleep Study Recording Time < 2 hours: N/A    Is Total Sleep Study Recording Time > 2 hours but study is incomplete: N/A    Is Total Sleep Study Recording Time 6 hours or more but sleep was not obtained: NO    Patient classification: employed       Post-Sleep Study    Medication used at bedtime or during sleep study:NO    Patient reports time it took to fall asleep:less than 20 minutes    Patient reports waking up during study:1 to 2 times.  Patient reports returning to sleep without difficulty.    Patient reports sleeping 6 to 8 hours with dreaming.    Does the Patient feel this is a typical night of sleep:typical    Patient rated sleepiness: Not sleepy or tired    PAP treatment:no.

## 2024-10-30 ENCOUNTER — TELEPHONE (OUTPATIENT)
Age: 59
End: 2024-10-30

## 2024-10-30 NOTE — TELEPHONE ENCOUNTER
Patient had sleep study done 10/22/24 he wanted to know if results are in yet and how to proceed    # [phone]

## 2024-10-31 NOTE — TELEPHONE ENCOUNTER
Sleep study with Insp[facility] resulted and shows AHI of 22.6.  Patient titrated from 1.0 V to maximum voltage of 1.7 V which did not result in sufficient reduction in AHI. At 1.7 V AHI is 35.4. Oxygen saturation maintained.     Recommendation:  Hunter Warner,     10/28/24  3:43 PM  Note     The patient had AHI of 10 at 1.2 V. Consider changing voltage to 1.3 and getting HSAT.        Call to patient reviewed results and recommendation for increase to 1.3 then getting a Home Sleep study.  Patient agrees to call Dr. Portillo office to schedule study.

## 2024-11-06 ENCOUNTER — TELEPHONE (OUTPATIENT)
Age: 59
End: 2024-11-06

## 2024-11-06 ENCOUNTER — OFFICE VISIT (OUTPATIENT)
Dept: FAMILY MEDICINE CLINIC | Facility: CLINIC | Age: 59
End: 2024-11-06
Payer: COMMERCIAL

## 2024-11-06 VITALS
WEIGHT: 200 LBS | BODY MASS INDEX: 30.31 KG/M2 | HEIGHT: 68 IN | HEART RATE: 86 BPM | SYSTOLIC BLOOD PRESSURE: 124 MMHG | DIASTOLIC BLOOD PRESSURE: 82 MMHG | RESPIRATION RATE: 16 BRPM | OXYGEN SATURATION: 97 % | TEMPERATURE: 97.9 F

## 2024-11-06 DIAGNOSIS — G47.33 OBSTRUCTIVE SLEEP APNEA: ICD-10-CM

## 2024-11-06 DIAGNOSIS — E11.69 TYPE 2 DIABETES MELLITUS WITH OTHER SPECIFIED COMPLICATION, WITHOUT LONG-TERM CURRENT USE OF INSULIN (HCC): Primary | ICD-10-CM

## 2024-11-06 DIAGNOSIS — E78.2 MIXED HYPERLIPIDEMIA: ICD-10-CM

## 2024-11-06 DIAGNOSIS — Z12.5 PROSTATE CANCER SCREENING: ICD-10-CM

## 2024-11-06 DIAGNOSIS — F41.9 ANXIETY: ICD-10-CM

## 2024-11-06 PROCEDURE — 99214 OFFICE O/P EST MOD 30 MIN: CPT | Performed by: FAMILY MEDICINE

## 2024-11-06 NOTE — ASSESSMENT & PLAN NOTE
Continue semaglutide 7mg daily. Continue diet and regular exercise. Will continue to monitor.

## 2024-11-06 NOTE — PROGRESS NOTES
Ambulatory Visit  Name: Cleveland Serrano      : 1965      MRN: [id number]  Encounter Provider: Dg Blackman MD  Encounter Date: 2024   Encounter department: [facility]    Assessment & Plan  Type 2 diabetes mellitus with other specified complication, without long-term current use of insulin (HCC)  Continue semaglutide 7mg nino.y Will continue to monitor with fasting glucose and A1C. Follow up in 3 months with blood work. Discussed diet and regular exercise.   Lab Results   Component Value Date    HGBA1C 7.2 (H) 2024       Orders:    Albumin / creatinine urine ratio; Future    Comprehensive metabolic panel; Future    Hemoglobin A1C; Future    CBC and differential; Future    Lipid Panel with Direct LDL reflex; Future    TSH, 3rd generation with Free T4 reflex; Future    Obstructive sleep apnea  Continue on CPAP. Will continue to monitor.        Anxiety  Stable on medications. Continue folow up with psychiatrist.        Mixed hyperlipidemia  Stable. Continue rosuvastatin daily. Will continue to monitor with fasting lipid profile.        BMI 30.0-30.9,adult  Continue semaglutide 7mg daily. Continue diet and regular exercise. Will continue to monitor.        Prostate cancer screening  Obtain prior to next visit  Orders:    PSA, Total Screen; Future       History of Present Illness     Patient presenting to the office for follow-up of multiple medical problems. He has been taking semaglutide daily and has been stable in his weight from 3 months ago. He did not have any recent blood work. He has no specific concerns for today's visit.         History obtained from : patient  Review of Systems   Constitutional:  Negative for chills, fatigue and fever.   HENT:  Negative for congestion.    Respiratory:  Negative for cough and shortness of breath.    Cardiovascular:  Negative for chest pain and palpitations.   Gastrointestinal:  Negative for abdominal pain and diarrhea.  "  Genitourinary:  Negative for dysuria.   Musculoskeletal:  Negative for arthralgias and back pain.   Skin:  Negative for rash.   Neurological:  Negative for light-headedness and headaches.         Objective     /82 (BP Location: Left arm, Patient Position: Sitting, Cuff Size: Standard)   Pulse 86   Temp 97.9 °F (36.6 °C) (Tympanic)   Resp 16   Ht 5' 8\" (1.727 m)   Wt 90.7 kg (200 lb)   SpO2 97%   BMI 30.41 kg/m²     Physical Exam  Vitals and nursing note reviewed.   Constitutional:       Appearance: Normal appearance. He is well-developed.   HENT:      Head: Normocephalic and atraumatic.   Eyes:      Conjunctiva/sclera: Conjunctivae normal.      Pupils: Pupils are equal, round, and reactive to light.   Cardiovascular:      Rate and Rhythm: Normal rate and regular rhythm.      Pulses: no weak pulses.           Dorsalis pedis pulses are 2+ on the right side and 2+ on the left side.      Heart sounds: Normal heart sounds.   Pulmonary:      Effort: Pulmonary effort is normal. No respiratory distress.      Breath sounds: Normal breath sounds.   Abdominal:      General: Abdomen is flat. Bowel sounds are normal.      Palpations: Abdomen is soft.   Musculoskeletal:         General: No swelling.      Cervical back: Normal range of motion and neck supple.   Feet:      Right foot:      Skin integrity: No ulcer, skin breakdown, erythema, warmth, callus or dry skin.      Left foot:      Skin integrity: No ulcer, skin breakdown, erythema, warmth, callus or dry skin.   Lymphadenopathy:      Cervical: No cervical adenopathy.   Skin:     General: Skin is warm.      Findings: No rash.   Neurological:      Mental Status: He is alert and oriented to person, place, and time.   Psychiatric:         Mood and Affect: Mood normal.     Patient's shoes and socks removed.    Right Foot/Ankle   Right Foot Inspection  Skin Exam: skin normal and skin intact. No dry skin, no warmth, no callus, no erythema, no maceration, no abnormal " color, no pre-ulcer, no ulcer and no callus.     Toe Exam: ROM and strength within normal limits.     Sensory   Monofilament testing: intact    Vascular  Capillary refills: < 3 seconds  The right DP pulse is 2+.     Left Foot/Ankle  Left Foot Inspection  Skin Exam: skin normal and skin intact. No dry skin, no warmth, no erythema, no maceration, normal color, no pre-ulcer, no ulcer and no callus.     Toe Exam: ROM and strength within normal limits.     Sensory   Monofilament testing: intact    Vascular  Capillary refills: < 3 seconds  The left DP pulse is 2+.     Assign Risk Category  No deformity present  No loss of protective sensation  No weak pulses  Risk: 0

## 2024-11-06 NOTE — ASSESSMENT & PLAN NOTE
Continue semaglutide 7mg nino.y Will continue to monitor with fasting glucose and A1C. Follow up in 3 months with blood work. Discussed diet and regular exercise.   Lab Results   Component Value Date    HGBA1C 7.2 (H) 09/03/2024       Orders:    Albumin / creatinine urine ratio; Future    Comprehensive metabolic panel; Future    Hemoglobin A1C; Future    CBC and differential; Future    Lipid Panel with Direct LDL reflex; Future    TSH, 3rd generation with Free T4 reflex; Future

## 2024-11-07 ENCOUNTER — TELEPHONE (OUTPATIENT)
Age: 59
End: 2024-11-07

## 2024-11-07 NOTE — TELEPHONE ENCOUNTER
Patients GI provider:  Dr. Cruz    Number to return call:  [phone]    Reason for call: Pt calling stated he has cancelled his surgery for tomorrow 11/8/24 with Dr. Cruz in stated not feeling well and to reschedule it. Please contact pt to reschedule it.    Scheduled procedure/appointment date if applicable: None

## 2024-11-07 NOTE — TELEPHONE ENCOUNTER
Patients GI provider:  Dr. Cruz    Number to return call: ([phone]    Reason for call: Pt called to cancel his surgery for 11/08/24 as he is not feeling well. I transferred the call to [facility] and Anneliese took the call    Scheduled procedure/appointment date if applicable: Apt/procedure 11/08/24

## 2024-11-07 NOTE — TELEPHONE ENCOUNTER
Surgery is being cancelled for 11/8/24 with Dr. Cruz. I explained to patient he will most likely be rescheduled for after she returns from maternity leave, pt agreeable to this. He would like a call back to reschedule for March.

## 2024-12-05 DIAGNOSIS — R09.89 RUNNY NOSE: ICD-10-CM

## 2024-12-05 RX ORDER — FLUTICASONE PROPIONATE 50 MCG
2 SPRAY, SUSPENSION (ML) NASAL DAILY
Qty: 16 G | Refills: 0 | Status: SHIPPED | OUTPATIENT
Start: 2024-12-05

## 2024-12-05 NOTE — TELEPHONE ENCOUNTER
Reason for call:   [x] Refill   [] Prior Auth  [] Other:     Office:   [x] PCP/Provider -   [] Specialty/Provider -     Medication:   Flonase 50mcg nasal spray- 2 sprays into each nostril daily      Pharmacy: [address] [place]    Does the patient have enough for 3 days?   [] Yes   [x] No - Send as HP to POD

## 2024-12-10 DIAGNOSIS — E78.2 MIXED HYPERLIPIDEMIA: ICD-10-CM

## 2024-12-10 DIAGNOSIS — E11.69 TYPE 2 DIABETES MELLITUS WITH OTHER SPECIFIED COMPLICATION, WITHOUT LONG-TERM CURRENT USE OF INSULIN (HCC): ICD-10-CM

## 2024-12-10 NOTE — TELEPHONE ENCOUNTER
Reason for call:   [x] Refill   [] Prior Auth  [] Other:     Office:   [x] PCP/Provider - Dg Blackman   [] Specialty/Provider -     Medication: rosuvastatin (CRESTOR) 20 MG tablet   Dose/Frequency: Take 1 tablet (20 mg total) by mouth daily  Quantity: 90    semaglutide (Rybelsus) 7 MG tablet   Take 1 tablet (7 mg total) by mouth daily before breakfast   Qty: 90    Does the patient have enough for 3 days?   [x] Yes   [] No - Send as HP to POD      Pharmacy: [facility]

## 2024-12-11 RX ORDER — ROSUVASTATIN CALCIUM 20 MG/1
20 TABLET, COATED ORAL DAILY
Qty: 90 TABLET | Refills: 1 | Status: SHIPPED | OUTPATIENT
Start: 2024-12-11

## 2024-12-18 ENCOUNTER — TELEPHONE (OUTPATIENT)
Dept: PSYCHIATRY | Facility: CLINIC | Age: 59
End: 2024-12-18

## 2024-12-18 DIAGNOSIS — R45.86 MOOD SWINGS: ICD-10-CM

## 2024-12-18 DIAGNOSIS — F33.0 MILD EPISODE OF RECURRENT MAJOR DEPRESSIVE DISORDER (HCC): Primary | ICD-10-CM

## 2024-12-18 RX ORDER — LAMOTRIGINE 25 MG/1
50 TABLET ORAL DAILY
Qty: 60 TABLET | Refills: 0 | Status: SHIPPED | OUTPATIENT
Start: 2024-12-18 | End: 2025-01-17

## 2024-12-18 NOTE — TELEPHONE ENCOUNTER
Rx for Lamictal 25 mg 2 tablets daily was escripted to [facility] for 30 days, no RF. Since it is a starting dose, issuing 90 day Rx would need to be reviewed with his primary psychiatrist as Lamictal dose likely needs to be titrated further.

## 2024-12-18 NOTE — TELEPHONE ENCOUNTER
Patient called the RX Refill Line. Message is being forwarded to the office.     Patient is requesting a message be sent to the office regarding the following medication:    lamoTRIgine (LaMICtal) 25 mg tablet    Patient states he is currently taking two tablets daily and is currently out of medication. He would like to know if a short term supply can be sent to [facility] [address] as well to refill to [facility] - [place], [place] - [address] [place] [address]    Please review, as instructions state Take 1 tablet (25 mg total) by mouth daily for 14 days, THEN 2 tablets (50 mg total) daily.     Please contact patient at [phone]

## 2024-12-18 NOTE — TELEPHONE ENCOUNTER
Please confirm if patient took Lamictal 50 mg yesterday. If he missed it for more than 3 days, he would have to restart at 25 mg per day

## 2024-12-18 NOTE — TELEPHONE ENCOUNTER
Called Cleveland and confirmed that he did take 50 MG of his Lamotrigine yesterday.  Informed him scripts will be sent as requested.

## 2025-01-21 ENCOUNTER — OFFICE VISIT (OUTPATIENT)
Dept: PSYCHIATRY | Facility: CLINIC | Age: 60
End: 2025-01-21
Payer: COMMERCIAL

## 2025-01-21 DIAGNOSIS — F41.9 ANXIETY: ICD-10-CM

## 2025-01-21 DIAGNOSIS — F33.41 RECURRENT MAJOR DEPRESSIVE DISORDER, IN PARTIAL REMISSION (HCC): ICD-10-CM

## 2025-01-21 DIAGNOSIS — R45.86 MOOD SWINGS: ICD-10-CM

## 2025-01-21 DIAGNOSIS — F33.0 MILD EPISODE OF RECURRENT MAJOR DEPRESSIVE DISORDER (HCC): Primary | ICD-10-CM

## 2025-01-21 PROCEDURE — 99214 OFFICE O/P EST MOD 30 MIN: CPT | Performed by: STUDENT IN AN ORGANIZED HEALTH CARE EDUCATION/TRAINING PROGRAM

## 2025-01-21 RX ORDER — LAMOTRIGINE 25 MG/1
TABLET ORAL
Qty: 60 TABLET | Refills: 0 | Status: SHIPPED | OUTPATIENT
Start: 2025-01-21 | End: 2025-01-24 | Stop reason: SDUPTHER

## 2025-01-21 RX ORDER — LAMOTRIGINE 100 MG/1
100 TABLET ORAL DAILY
Qty: 90 TABLET | Refills: 1 | Status: SHIPPED | OUTPATIENT
Start: 2025-02-14

## 2025-01-21 RX ORDER — LAMOTRIGINE 100 MG/1
100 TABLET ORAL DAILY
Qty: 90 TABLET | Refills: 1 | Status: SHIPPED | OUTPATIENT
Start: 2025-02-21 | End: 2025-01-21 | Stop reason: SDUPTHER

## 2025-01-21 RX ORDER — FLUOXETINE 40 MG/1
40 CAPSULE ORAL DAILY
Qty: 90 CAPSULE | Refills: 3 | Status: SHIPPED | OUTPATIENT
Start: 2025-01-21

## 2025-01-21 NOTE — ASSESSMENT & PLAN NOTE
Orders:    lamoTRIgine (LaMICtal) 25 mg tablet; Take 1 tablet (25 mg total) by mouth daily for 14 days, THEN 2 tablets (50 mg total) daily for 14 days.    lamoTRIgine (LaMICtal) 100 mg tablet; Take 1 tablet (100 mg total) by mouth daily Do not start before February 14, 2025.

## 2025-01-21 NOTE — PSYCH
MEDICATION MANAGEMENT NOTE        [facility]      Name and Date of Birth:  Cleveland Serrano 59 y.o. 1965 MRN: [id number]    Date of Visit: January 21, 2025    Reason for Visit: Follow-up visit for medication management       SUBJECTIVE:    Cleveland Serrano is a 59 y.o. male with past psychiatric history significant for major depressive disorder, complicated bereheavement, and nicotine use disorder who was personally seen and evaluated today at the [facility] outpatient clinic for follow-up and medication management. Cleveland presents as pleasant and cooperative. His thoughts are linear and organized. He completes assessment without difficulty.     Cleveland endorses compliance with psychotropic medication regimen consisting of Prozac. He denies adverse medication side effects. Cleveland was started on Lamictal following last visit for depression concerns and mood lability. He tolerated this change well and reports improvement in mood control and depression. Unfortunately, he has been without the medications for about 1 month as he never requested refills. He requests resuming the medication and potential optimization, given tolerability and effectiveness. Cleveland continues to endorse episodic depression and affective instability, mostly related to his GF's antagonizing behaviors. He is still experiencing outbursts but notes these were less frequent and less intense while on Lamictal. Cleveland's sleep and appetite are stable. No current SI/HI. His energy and motivation wax and wane depending on his work schedule. Cleveland is without hopelessness, anhedonia, crying spells, or changes in cognition. He does report some feelings of apathy. Cleveland currently endorses occasional and appropriate anxiety that is not pathologic in nature. Cleveland denies excessive nervousness, irrational worry, or overt anxiousness. Cleveland is not currently restless or tense nor  "does Cleveland feel \"keyed up\" or on-edge. Cleveland denies new-onset panic symptomatology or maladaptive behaviors. Throughout today's session, Cleveland does not appear visibly perturbed. Acutely, Cleveland does not exhibit objective evidence of misael/hypomania or psychosis. Cleveland denies recent ETOH or illicit substance abuse. Cleveland offers no further concerns.       Current Rating Scores:     None completed today.    Review Of Systems:      Constitutional fluctuating energy level and as noted in HPI   ENT negative   Cardiovascular negative   Respiratory negative   Gastrointestinal negative   Genitourinary negative   Musculoskeletal negative   Integumentary negative   Neurological negative   Endocrine negative   Other Symptoms none, all other systems are negative       Past Psychiatric History: (unchanged information from previous note copied and italicized) - Information that is bolded has been updated.      Inpatient psychiatric admissions: Denies  Prior outpatient psychiatric linkage: Denies - received Sertraline from PCP  Past/current psychotherapy: Denies  History of suicidal attempts/gestures: Denies  History of violence/aggressive behaviors: was court-mandated to \"anger management\" classes - completed the 6 week program in July 2022  Psychotropic medication trials: Sertraline 100mg Daily (experiencing ED), Wellbutrin (over activated, more anxious), Trintellix, Prozac (now), Abilify (only took 5mg for several days), Lamictal  Substance abuse inpatient/outpatient rehabilitation: Denies     Substance Abuse History: (unchanged information from previous note copied and italicized) - Information that is bolded has been updated.      No history of ETOH or illict substance. He does endorse a use of tobacco abuse (previous 2pack/day smoker). No past legal actions or arrests secondary to substance intoxication. The patient denies prior DWIs/DUIs. No history of outpatient/inpatient rehabilitation programs. Cleveland does not " exhibit objective evidence of substance withdrawal during today's examination nor does Cleveland appear under the influence of any psychoactive substance.          Social History: (unchanged information from previous note copied and italicized) - Information that is bolded has been updated.      Developmental: Denies a history of milestone/developmental delay. Denies a history of in-utero exposure to toxins/illicit substances. There is no documented history of IEP or need for special education.  Education: high school diploma/GED - graduated from [employer]  Marital history:  - He had 2 sons with his ex-wife. He is now involved in a new relationship.   Living arrangement, social support: significant other. He is still in contact with his younger son (30 years old)  Occupational History: Employed as a  - works 5 days per week (2 days, 2 nights, and 1 midday shift)  Access to firearms: Denies direct access to weapons/firearms. Cleveland Serrano has no history of arrests or violence with a deadly weapon.         Traumatic History: (unchanged information from previous note copied and italicized) - Information that is bolded has been updated.      Abuse:none is reported  Other Traumatic Events: Tragically lost his son to a MVA and his mother to cancer in 2018      Past Medical History:    Past Medical History:   Diagnosis Date    Anxiety     CPAP (continuous positive airway pressure) dependence     Depression     Sleep apnea         Past Surgical History:   Procedure Laterality Date    MULTIPLE TOOTH EXTRACTIONS      WY OPEN IMPLTJ HPGLSL NRV NSTIM RA PG&RESPIR SENSOR N/A 4/11/2024    Procedure: INSERTION UPPER AIRWAY STIMULATOR;  Surgeon: Jose Tyson MD;  Location: [facility];  Service: ENT    WY PALATOPHARYNGOPLASTY N/A 4/7/2022    Procedure: UVULOPALATOPHARYNGOPLASTY (UPPP);  Surgeon: Jose Tyson MD;  Location: [facility];  Service: ENT    TESTICLE SURGERY       No Known  Allergies    Substance Abuse History:    Social History     Substance and Sexual Activity   Alcohol Use Not Currently     Social History     Substance and Sexual Activity   Drug Use No       Social History:    Social History     Socioeconomic History    Marital status: Single     Spouse name: Not on file    Number of children: Not on file    Years of education: Not on file    Highest education level: Not on file   Occupational History    Not on file   Tobacco Use    Smoking status: Every Day     Current packs/day: 0.25     Average packs/day: 0.2 packs/day for 45.1 years (11.3 ttl pk-yrs)     Types: Cigarettes     Start date: 1980    Smokeless tobacco: Current   Vaping Use    Vaping status: Never Used   Substance and Sexual Activity    Alcohol use: Not Currently    Drug use: No    Sexual activity: Yes     Partners: Female   Other Topics Concern    Not on file   Social History Narrative    Not on file     Social Drivers of Health     Financial Resource Strain: Not on file   Food Insecurity: Not on file   Transportation Needs: Not on file   Physical Activity: Not on file   Stress: Not on file   Social Connections: Not on file   Intimate Partner Violence: Not on file   Housing Stability: Not on file       Family Psychiatric History:     Family History   Problem Relation Age of Onset    Anxiety disorder Father         Previously on Valium    Depression Father        History Review: The following portions of the patient's history were reviewed and updated as appropriate: allergies, current medications, past family history, past medical history, past social history, past surgical history, and problem list.         OBJECTIVE:     Vital signs in last 24 hours:    There were no vitals filed for this visit.    Mental Status Evaluation:    Appearance age appropriate, casually dressed, dressed appropriately, looks stated age   Behavior pleasant, cooperative, calm, good eye contact   Speech normal rate, normal volume, normal  pitch   Mood dysphoric   Affect constricted   Thought Processes organized, coherent, goal directed   Associations intact associations   Thought Content no overt delusions   Perceptual Disturbances: no auditory hallucinations, no visual hallucinations   Abnormal Thoughts  Risk Potential Suicidal ideation - None at present  Homicidal ideation - None at present  Potential for aggression - No   Orientation oriented to person, place, time/date, and situation   Memory recent and remote memory grossly intact   Consciousness alert and awake   Attention Span Concentration Span attention span and concentration are age appropriate   Intellect appears to be of average intelligence   Insight intact and good   Judgement intact and good   Muscle Strength and  Gait normal gait and normal balance   Motor activity no abnormal movements   Language no difficulty naming common objects   Fund of Knowledge adequate knowledge of current events   Pain none   Pain Scale Did not ask patient to formally rate       Laboratory Results: I have personally reviewed all pertinent laboratory/tests results    Recent Labs:   No visits with results within 1 Month(s) from this visit.   Latest known visit with results is:   Hospital Outpatient Visit on 09/11/2024   Component Date Value    Case Report 09/11/2024                      Value:Surgical Pathology Report                         Case: [id number]                                  Authorizing Provider:  Jackson Otoole MD       Collected:           09/11/2024 0941              Ordering Location:     [facility]        Received:            09/11/2024 [address] [place] [address] Endoscopy                                                     Pathologist:           Kaila Jerez MD                                                             Specimens:   A) - Colon, cold snare hepatic flexure polyp x 1                                                    B) - Colon, cold  snare ascending colon polyp x 1                                                    C) - Colon, cold snare transverse colon polyp x 1                                                   D) - Colon, cold snare descending colon polyp x 1                                                   E) - Colon, cold bx anal lesion at anal verge                                              Final Diagnosis 09/11/2024                      Value:A. Colon, cold snare hepatic flexure polyp x 1:  - Fragments of tubular adenoma, with no high grade dysplasia or carcinoma     B. Colon, cold snare ascending colon polyp x 1:  - Fragments of tubular adenoma, with no high grade dysplasia or carcinoma     C. Colon, cold snare transverse colon polyp x 1:  - Tubular adenoma, with no high grade dysplasia or carcinoma     D. Colon, cold snare descending colon polyp x 1:  - Tubular adenoma, with no high grade dysplasia or carcinoma     E. Colon, cold bx anal lesion at anal verge:  - Fragments of squamous mucosa with viral cytopathic effect, consistent with condylomatous change  - No high-grade dysplasia seen        Additional Information 09/11/2024                      Value:All reported additional testing was performed with appropriately reactive controls.  These tests were developed and their performance characteristics determined by [facility] or appropriate performing facility, though some tests may be performed on tissues which have not been validated for performance characteristics (such as staining performed on alcohol exposed cell blocks and decalcified tissues).  Results should be interpreted with caution and in the context of the patients’ clinical condition. These tests may not be cleared or approved by the U.S. Food and Drug Administration, though the FDA has determined that such clearance or approval is not necessary. These tests are used for clinical purposes and they should not be regarded as investigational or for  "research. This laboratory has been approved by [place] [employer], designated as a high-complexity laboratory and is qualified to perform these tests.  .      Synoptic Checklist 09/11/2024                      Value:                            COLON/RECTUM POLYP FORM - GI - All Specimens                                                                                     :    Adenoma(s)      Gross Description 09/11/2024                      Value:A. The specimen is received in formalin, labeled with the patient's name and hospital number, and is designated \" hepatic flexure polyp x 1\".  The specimen consists of 2 tan soft tissue fragments measuring 0.5 and 1.0 cm in greatest dimension.  Entirely submitted. Screened cassette.  B. The specimen is received in formalin, labeled with the patient's name and hospital number, and is designated \" ascending colon polyp x 1\".  The specimen consists of multiple tan-orange soft tissue fragments versus presumed food particles measuring in aggregate of 0.8 x 0.3 x 0.2 cm.  Entirely submitted. Screened cassette.  C. The specimen is received in formalin, labeled with the patient's name and hospital number, and is designated \" transverse colon polyp x 1\".  The specimen consists of a single tan polyp measuring 0.5 x 0.4 x 0.3 cm.  Entirely submitted. Screened cassette.  D. The specimen is received in formalin, labeled with the patient's name and hospital number, and is designated \" descending                           colon polyp x 1\".  The specimen consists of a single tan soft tissue fragment measuring 0.5 cm in greatest dimension.  Entirely submitted. Screened cassette.  E. The specimen is received in formalin, labeled with the patient's name and hospital number, and is designated \" colon cold biopsy, anal lesion at anal verge\".  The specimen is strained into an embedding bag and consists of multiple white soft tissue fragments measuring in aggregate of 1.2 x 0.2 x 0.1 cm.  Entirely submitted. " Screened cassette.    Note: The estimated total formalin fixation time based upon information provided by the submitting clinician and the standard processing schedule is under 72 hours.    -[facility]         Suicide/Homicide Risk Assessment:    The following interventions are recommended: continue medication management, contracts for safety at present - agrees to go to ED if feeling unsafe      Lethality Statement:    Based on today's assessment and clinical criteria, Cleveland Serrano contracts for safety and is not an imminent risk of harm to self or others. Outpatient level of care is deemed appropriate at this current time. Cleveland understands that if they can no longer contract for safety, they need to call the office or report to their nearest Emergency Room for immediate evaluation.      Assessment/Plan:     Cleveland Serrano is a 59 y.o. male with past psychiatric history significant for major depressive disorder, complicated bereheavement, and nicotine use disorder who was personally seen and evaluated today at the [facility] outpatient clinic for follow-up and medication management. Cleveland endorses a neurotypical history without mood symptomatology or anxiety prior to 2020. He denies prior psychiatric treatment or counseling before 2018 and no previous medication trials. Several years ago, Cleveland's oldest son was killed in a MVA after hydroplaning during a wet evening. Approximately 3-6 months after his death, Cleveland's mother succumbed to a terminal illness (cancer). These two significant losses were the catalyst for new-onset depressive symptomatology. After 2018, Cleveland admits to episodic sadness and intermittent feelings of loss. These symptoms intensified since March/April 2020, likely secondary to COVID-19, mandatory quearentine, and subsequent disconnect from others. In June 2020, during an office visit with his PCP, Cleveland endorsed worsening depression and anxiety and was started  on Sertraline which was titrated to 100mg Daily. He endorsed improvement in mood and resolution in daily anxiety, however, he was experiencing sexual dysfunction (erectile dysfunction) which was problematic. As such, he was tapered of Zoloft and Wellbutrin was started which was over-stimulating and induced anxiety. This agent was subsequently discontinued and Cleveland was started on Trintellix, which his insurance did not cover. Today, he remains on Prozac and is tolerating it well.     Today's Plan:     Psychopharmacologically, Cleveland reports tolerability associated with Prozac. Cleveland was started on Lamictal following last visit for depression concerns and mood lability. He tolerated this change well and reports improvement in mood control and depression. Unfortunately, he has been without the medications for about 1 month as he never requested refills. He requests resuming the medication and potential optimization, given tolerability and effectiveness. As such, new script for Lamictal 25mg Daily sent to pharmacy today. He was educated to take 1 tablet for 2 weeks then 2 tablets (50mg for 2 weeks). After 2 weeks on 50mg, a new script for Lamictal 100mg Daily will be dispensed. He summarized the plan and voiced understanding. Risks/benefits/alternatives to treatment discussed, including a myriad of potential adverse medication side effects (such as rash and/or SJS), to which Cleveland voiced understanding and consented fully to treatment.       DSM-V Diagnoses:      1.) Major Depressive Episode, recurrent, with anxious distress  2.) Nicotine Use Disorder  3.) Mood Swings - NEW        Treatment Recommendations/Precautions:     1.) Major Depressive Episode, recurrent, with anxious distress  - Hx of medication trials including: Sertraline (tolerated well but experience sexual dysfunction), Wellbutrin (too activating)  - Continue Prozac 60mg Daily  - Start Lamictal 25mg Daily for 2 weeks - optimize to 50mg Daily after 2  "weeks - After 2 weeks on 50mg, optimize to 100mg Daily (separate script sent) - patient aware this is off-label treatment yet evidenced based  - Discussed referral for psychotherapy - given packet in past given extensive wait list at [facility]      2.) Nicotine Use Disorder  - States that he previously smoked 2 packs per day - he is now smoking less frequently - states today that he can go \"4 days without a cigarette\"  - Completed Rx Chantix by PCP- in January 2022  - Completed hypnosis   - Offered trial of Wellbutrin (this time with SR version rather than XL version) which would be appropriate and likely beneficial for mood, smoking, and struggles with ED, which are not entirely related to SSRI use  - Psychoeducation provided regarding the importance of exercise and healthy dietary choices and their impact on mood, energy, and motivation  - Counseled to avoid ETOH, illict substances, and nicotine secondary to the detrimental effects of these substances on mental and physical health     3.) Mood Swings  - Start Lamictal 25mg Daily for 2 weeks - optimize to 50mg Daily after 2 weeks - After 2 weeks on 50mg, optimize to 100mg Daily (separate script sent) - patient aware this is off-label treatment yet evidenced based      Assessment & Plan  Mild episode of recurrent major depressive disorder (HCC)    Orders:    lamoTRIgine (LaMICtal) 25 mg tablet; Take 1 tablet (25 mg total) by mouth daily for 14 days, THEN 2 tablets (50 mg total) daily for 14 days.    lamoTRIgine (LaMICtal) 100 mg tablet; Take 1 tablet (100 mg total) by mouth daily Do not start before February 14, 2025.    Anxiety         Mood swings    Orders:    lamoTRIgine (LaMICtal) 25 mg tablet; Take 1 tablet (25 mg total) by mouth daily for 14 days, THEN 2 tablets (50 mg total) daily for 14 days.    lamoTRIgine (LaMICtal) 100 mg tablet; Take 1 tablet (100 mg total) by mouth daily Do not start before February 14, 2025.    Recurrent major depressive disorder, in " partial remission (HCC)    Orders:    FLUoxetine (PROzac) 20 mg capsule; Take 1 capsule (20 mg total) by mouth daily    FLUoxetine (PROzac) 40 MG capsule; Take 1 capsule (40 mg total) by mouth daily        Aware of need to follow up with family physician for medical issues  Aware of 24 hour and weekend coverage for urgent situations accessed by calling [facility] main practice number    Medications Risks/Benefits      Risks, Benefits And Possible Side Effects Of Medications:    Risks, benefits, and possible side effects of medications explained to Clevealnd including risk of rash related to treatment with Lamictal and risk of suicidality and serotonin syndrome related to treatment with antidepressants. He verbalizes understanding and agreement for treatment.    Controlled Medication Discussion:     Not applicable    Psychotherapy Provided:     Individual psychotherapy provided: No     Treatment Plan:    Completed and signed during the session: Not applicable - Treatment Plan not due at this session      Visit Time    Visit Start Time: 1:00 PM  Visit Stop Time: 1:25 PM  Total Visit Duration:  25 minutes     The total visit duration detailed above includes: patient engagement, medication management, psychotherapy/counseling, discussion regarding treatment goals, documentation, review of past medical records, and coordination of care.      Note Share Disclaimer:     This note was not shared with the patient due to reasonable likelihood of causing patient harm      David Pringle MD  Board Certified Diplomate of the American Board of Psychiatry and Neurology  01/21/25

## 2025-01-24 ENCOUNTER — TELEPHONE (OUTPATIENT)
Dept: PSYCHIATRY | Facility: CLINIC | Age: 60
End: 2025-01-24

## 2025-01-24 DIAGNOSIS — F33.0 MILD EPISODE OF RECURRENT MAJOR DEPRESSIVE DISORDER (HCC): ICD-10-CM

## 2025-01-24 DIAGNOSIS — R45.86 MOOD SWINGS: ICD-10-CM

## 2025-01-24 RX ORDER — LAMOTRIGINE 25 MG/1
TABLET ORAL
Qty: 42 TABLET | Refills: 0 | Status: SHIPPED | OUTPATIENT
Start: 2025-01-24 | End: 2025-02-21

## 2025-01-24 NOTE — TELEPHONE ENCOUNTER
Fax received from [facility] (in [place]), requesting a different quantity be dispensed. Can resend or print and fax. Thanks

## 2025-01-24 NOTE — TELEPHONE ENCOUNTER
"Called and spoke to the pharmacist to confirm that the scripts are ok now. She asked that she change the instructions on lamictal 100 mg to state \"do not start until titration schedule is complete as directed by provider.\" Because she thinks it could be confusing for Cleveland if he gets the medication on a date that wouldn't line up accordingly with the do not start date on the bottle. I told her this would be ok. Nothing further needed at this time.   "

## 2025-01-24 NOTE — TELEPHONE ENCOUNTER
Cleveland was without Lamictal for 4+ weeks so we could not titrate to 100mg and we needed to restart treatment. As such, I sent 2 separate scripts. One script is as follows: Take 1 tablet (25 mg total) by mouth daily for 14 days, THEN 2 tablets (50 mg total) daily for 14 days (total of 42 tabs, no refills). After this titration is completed, he will increase dose to 100mg Daily (1 tablet). A 90 day script for 100mg was also sent. I hope this clarifies the concerns.

## 2025-02-13 DIAGNOSIS — R45.86 MOOD SWINGS: ICD-10-CM

## 2025-02-13 DIAGNOSIS — F33.0 MILD EPISODE OF RECURRENT MAJOR DEPRESSIVE DISORDER (HCC): ICD-10-CM

## 2025-02-13 RX ORDER — LAMOTRIGINE 25 MG/1
TABLET ORAL
Qty: 42 TABLET | Refills: 11 | OUTPATIENT
Start: 2025-02-13

## 2025-03-05 ENCOUNTER — TELEPHONE (OUTPATIENT)
Age: 60
End: 2025-03-05

## 2025-03-05 NOTE — TELEPHONE ENCOUNTER
Patient called, request status of Lab orders. Confirmed active lab orders issued 11/2024. Please advise Patient tat [phone], if any further questions.

## 2025-03-28 ENCOUNTER — APPOINTMENT (OUTPATIENT)
Dept: LAB | Facility: CLINIC | Age: 60
End: 2025-03-28
Payer: COMMERCIAL

## 2025-03-28 DIAGNOSIS — E11.69 TYPE 2 DIABETES MELLITUS WITH OTHER SPECIFIED COMPLICATION, WITHOUT LONG-TERM CURRENT USE OF INSULIN (HCC): ICD-10-CM

## 2025-03-28 DIAGNOSIS — Z12.5 PROSTATE CANCER SCREENING: ICD-10-CM

## 2025-03-28 LAB
ALBUMIN SERPL BCG-MCNC: 4.3 G/DL (ref 3.5–5)
ALP SERPL-CCNC: 76 U/L (ref 34–104)
ALT SERPL W P-5'-P-CCNC: 23 U/L (ref 7–52)
ANION GAP SERPL CALCULATED.3IONS-SCNC: 8 MMOL/L (ref 4–13)
AST SERPL W P-5'-P-CCNC: 15 U/L (ref 13–39)
BASOPHILS # BLD AUTO: 0.05 THOUSANDS/ÂΜL (ref 0–0.1)
BASOPHILS NFR BLD AUTO: 1 % (ref 0–1)
BILIRUB SERPL-MCNC: 0.41 MG/DL (ref 0.2–1)
BUN SERPL-MCNC: 17 MG/DL (ref 5–25)
CALCIUM SERPL-MCNC: 9.2 MG/DL (ref 8.4–10.2)
CHLORIDE SERPL-SCNC: 106 MMOL/L (ref 96–108)
CHOLEST SERPL-MCNC: 121 MG/DL (ref ?–200)
CO2 SERPL-SCNC: 26 MMOL/L (ref 21–32)
CREAT SERPL-MCNC: 1.06 MG/DL (ref 0.6–1.3)
EOSINOPHIL # BLD AUTO: 0.01 THOUSAND/ÂΜL (ref 0–0.61)
EOSINOPHIL NFR BLD AUTO: 0 % (ref 0–6)
ERYTHROCYTE [DISTWIDTH] IN BLOOD BY AUTOMATED COUNT: 12.7 % (ref 11.6–15.1)
EST. AVERAGE GLUCOSE BLD GHB EST-MCNC: 174 MG/DL
GFR SERPL CREATININE-BSD FRML MDRD: 75 ML/MIN/1.73SQ M
GLUCOSE P FAST SERPL-MCNC: 161 MG/DL (ref 65–99)
HBA1C MFR BLD: 7.7 %
HCT VFR BLD AUTO: 48.5 % (ref 36.5–49.3)
HDLC SERPL-MCNC: 39 MG/DL
HGB BLD-MCNC: 15.9 G/DL (ref 12–17)
IMM GRANULOCYTES # BLD AUTO: 0.08 THOUSAND/UL (ref 0–0.2)
IMM GRANULOCYTES NFR BLD AUTO: 1 % (ref 0–2)
LDLC SERPL CALC-MCNC: 51 MG/DL (ref 0–100)
LYMPHOCYTES # BLD AUTO: 2.34 THOUSANDS/ÂΜL (ref 0.6–4.47)
LYMPHOCYTES NFR BLD AUTO: 23 % (ref 14–44)
MCH RBC QN AUTO: 31.2 PG (ref 26.8–34.3)
MCHC RBC AUTO-ENTMCNC: 32.8 G/DL (ref 31.4–37.4)
MCV RBC AUTO: 95 FL (ref 82–98)
MONOCYTES # BLD AUTO: 0.64 THOUSAND/ÂΜL (ref 0.17–1.22)
MONOCYTES NFR BLD AUTO: 6 % (ref 4–12)
NEUTROPHILS # BLD AUTO: 7.11 THOUSANDS/ÂΜL (ref 1.85–7.62)
NEUTS SEG NFR BLD AUTO: 69 % (ref 43–75)
NRBC BLD AUTO-RTO: 0 /100 WBCS
PLATELET # BLD AUTO: 397 THOUSANDS/UL (ref 149–390)
PMV BLD AUTO: 9 FL (ref 8.9–12.7)
POTASSIUM SERPL-SCNC: 4.4 MMOL/L (ref 3.5–5.3)
PROT SERPL-MCNC: 6.9 G/DL (ref 6.4–8.4)
PSA SERPL-MCNC: 1.64 NG/ML (ref 0–4)
RBC # BLD AUTO: 5.09 MILLION/UL (ref 3.88–5.62)
SODIUM SERPL-SCNC: 140 MMOL/L (ref 135–147)
TRIGL SERPL-MCNC: 153 MG/DL (ref ?–150)
TSH SERPL DL<=0.05 MIU/L-ACNC: 0.88 UIU/ML (ref 0.45–4.5)
WBC # BLD AUTO: 10.23 THOUSAND/UL (ref 4.31–10.16)

## 2025-03-28 PROCEDURE — G0103 PSA SCREENING: HCPCS

## 2025-03-28 PROCEDURE — 85025 COMPLETE CBC W/AUTO DIFF WBC: CPT

## 2025-03-28 PROCEDURE — 36415 COLL VENOUS BLD VENIPUNCTURE: CPT

## 2025-03-28 PROCEDURE — 83036 HEMOGLOBIN GLYCOSYLATED A1C: CPT

## 2025-03-28 PROCEDURE — 80053 COMPREHEN METABOLIC PANEL: CPT

## 2025-03-28 PROCEDURE — 84443 ASSAY THYROID STIM HORMONE: CPT

## 2025-03-28 PROCEDURE — 80061 LIPID PANEL: CPT

## 2025-04-01 ENCOUNTER — OFFICE VISIT (OUTPATIENT)
Dept: FAMILY MEDICINE CLINIC | Facility: CLINIC | Age: 60
End: 2025-04-01
Payer: COMMERCIAL

## 2025-04-01 VITALS
TEMPERATURE: 96.5 F | RESPIRATION RATE: 16 BRPM | DIASTOLIC BLOOD PRESSURE: 80 MMHG | SYSTOLIC BLOOD PRESSURE: 112 MMHG | WEIGHT: 200.6 LBS | HEART RATE: 74 BPM | HEIGHT: 68 IN | BODY MASS INDEX: 30.4 KG/M2 | OXYGEN SATURATION: 97 %

## 2025-04-01 DIAGNOSIS — E78.2 MIXED HYPERLIPIDEMIA: ICD-10-CM

## 2025-04-01 DIAGNOSIS — E11.69 TYPE 2 DIABETES MELLITUS WITH OTHER SPECIFIED COMPLICATION, WITHOUT LONG-TERM CURRENT USE OF INSULIN (HCC): Primary | ICD-10-CM

## 2025-04-01 DIAGNOSIS — F33.0 MILD EPISODE OF RECURRENT MAJOR DEPRESSIVE DISORDER (HCC): ICD-10-CM

## 2025-04-01 DIAGNOSIS — F17.210 CIGARETTE NICOTINE DEPENDENCE WITHOUT COMPLICATION: ICD-10-CM

## 2025-04-01 DIAGNOSIS — Z00.01 ABNORMAL WELLNESS EXAM: ICD-10-CM

## 2025-04-01 DIAGNOSIS — A63.0 GENITAL WARTS: ICD-10-CM

## 2025-04-01 PROBLEM — R73.9 HYPERGLYCEMIA: Status: RESOLVED | Noted: 2023-01-10 | Resolved: 2025-04-01

## 2025-04-01 PROBLEM — E11.9 DIABETES MELLITUS (HCC): Status: RESOLVED | Noted: 2024-09-04 | Resolved: 2025-04-01

## 2025-04-01 PROCEDURE — 99396 PREV VISIT EST AGE 40-64: CPT | Performed by: FAMILY MEDICINE

## 2025-04-01 PROCEDURE — 99214 OFFICE O/P EST MOD 30 MIN: CPT | Performed by: FAMILY MEDICINE

## 2025-04-01 RX ORDER — IMIQUIMOD 12.5 MG/.25G
1 CREAM TOPICAL 3 TIMES WEEKLY
Qty: 12 EACH | Refills: 0 | Status: SHIPPED | OUTPATIENT
Start: 2025-04-02

## 2025-04-01 RX ORDER — NICOTINE 21 MG/24HR
1 PATCH, TRANSDERMAL 24 HOURS TRANSDERMAL EVERY 24 HOURS
Qty: 28 PATCH | Refills: 0 | Status: SHIPPED | OUTPATIENT
Start: 2025-04-01

## 2025-04-01 NOTE — ASSESSMENT & PLAN NOTE
Improved on the Crestor 20 mg daily.  Continue same.  We will continue to monitor fasting lipid profile.

## 2025-04-01 NOTE — PROGRESS NOTES
Name: Cleveland Serrano      : 1965      MRN: [id number]  Encounter Provider: Dg Blackman MD  Encounter Date: 2025   Encounter department: [facility]  :  Assessment & Plan  Type 2 diabetes mellitus with other specified complication, without long-term current use of insulin (HCC)  Fasting glucose was elevated (161) and A1c was 7.7   Increased semaglutide to 14 mg. Discussed eating low-carb diet and exercising. Will continue to monitor fasting glucose and A1c. Follow-up in one month.   Lab Results   Component Value Date    HGBA1C 7.7 (H) 2025       Orders:  •  semaglutide (Rybelsus) 14 MG tablet; Take 1 tablet (14 mg total) by mouth daily before breakfast    Cigarette nicotine dependence without complication  Pt has cut back to a few cigarettes every few days, but would like patches to quit completely.   Discussed using nicoderm patch once every 24 hours.   Orders:  •  nicotine (NICODERM CQ) 14 mg/24hr TD 24 hr patch; Place 1 patch on the skin over 24 hours every 24 hours  •  nicotine (NICODERM CQ) 7 mg/24hr TD 24 hr patch; Place 1 patch on the skin over 24 hours every 24 hours    Genital warts  Ordered aldara to use 3 times a week.   Orders:  •  imiquimod (ALDARA) 5 % cream; Apply 1 packet topically 3 (three) times a week    Abnormal wellness exam  It was discussed about immunizations, diet, exercise and safety measures.       Mild episode of recurrent major depressive disorder (HCC)  Stable on Lamictal and fluoxetine.  Continue same.  Follow-up with psychiatrist.       Mixed hyperlipidemia  Improved on the Crestor 20 mg daily.  Continue same.  We will continue to monitor fasting lipid profile.              History of Present Illness   RW is a 60 y/o male w PMH of DM, CECILIA, Anxiety, and HLD presenting today to discuss nicotine patches. He has cut back and is smoking a few cigarettes intermittently and would like to use the patches to quit completely. He reports that he is  "doing well overall and has no other active complaints today. He denies chest pain, SOB, LE edema, and n/v/d/c. His blood pressure in the office today is well controlled at 112/80. Recent lab work showed a fasting glucose of 161 and A1c of 7.7. His triglycerides are elevated (153) and all other results were within normal limits.       Review of Systems   Constitutional:  Negative for chills and fever.   HENT:  Negative for ear pain and sore throat.    Eyes:  Negative for pain.   Respiratory:  Negative for cough and shortness of breath.    Cardiovascular:  Negative for chest pain, palpitations and leg swelling.   Gastrointestinal:  Negative for abdominal pain, constipation, diarrhea, nausea and vomiting.   Genitourinary:  Negative for dysuria.   Musculoskeletal:  Negative for arthralgias.   Skin:  Negative for rash.        Skin lesions on his penis   Neurological:  Negative for dizziness, syncope, light-headedness and headaches.   All other systems reviewed and are negative.      Objective   /80 (BP Location: Left arm, Patient Position: Sitting, Cuff Size: Standard)   Pulse 74   Temp (!) 96.5 °F (35.8 °C) (Tympanic)   Resp 16   Ht 5' 8\" (1.727 m)   Wt 91 kg (200 lb 9.6 oz)   SpO2 97%   BMI 30.50 kg/m²      Physical Exam  Vitals and nursing note reviewed.   Constitutional:       General: He is not in acute distress.     Appearance: Normal appearance. He is well-developed.   HENT:      Head: Normocephalic and atraumatic.   Eyes:      Conjunctiva/sclera: Conjunctivae normal.   Cardiovascular:      Rate and Rhythm: Normal rate and regular rhythm.      Heart sounds: No murmur heard.  Pulmonary:      Effort: Pulmonary effort is normal. No respiratory distress.      Breath sounds: Normal breath sounds.   Abdominal:      Palpations: Abdomen is soft.      Tenderness: There is no abdominal tenderness.   Genitourinary:         Comments: Skin lesions on his penis compatible with genital warts  Musculoskeletal:        "  General: No swelling.      Cervical back: Neck supple.   Skin:     General: Skin is warm and dry.      Capillary Refill: Capillary refill takes less than 2 seconds.      Findings: Lesion present.   Neurological:      Mental Status: He is alert.   Psychiatric:         Mood and Affect: Mood normal.

## 2025-04-01 NOTE — ASSESSMENT & PLAN NOTE
Fasting glucose was elevated (161) and A1c was 7.7   Increased semaglutide to 14 mg. Discussed eating low-carb diet and exercising. Will continue to monitor fasting glucose and A1c. Follow-up in one month.   Lab Results   Component Value Date    HGBA1C 7.7 (H) 03/28/2025       Orders:  •  semaglutide (Rybelsus) 14 MG tablet; Take 1 tablet (14 mg total) by mouth daily before breakfast

## 2025-04-01 NOTE — ASSESSMENT & PLAN NOTE
Pt has cut back to a few cigarettes every few days, but would like patches to quit completely.   Discussed using nicoderm patch once every 24 hours.   Orders:  •  nicotine (NICODERM CQ) 14 mg/24hr TD 24 hr patch; Place 1 patch on the skin over 24 hours every 24 hours  •  nicotine (NICODERM CQ) 7 mg/24hr TD 24 hr patch; Place 1 patch on the skin over 24 hours every 24 hours

## 2025-04-01 NOTE — ASSESSMENT & PLAN NOTE
Ordered aldara to use 3 times a week.   Orders:  •  imiquimod (ALDARA) 5 % cream; Apply 1 packet topically 3 (three) times a week

## 2025-04-07 RX ORDER — DIPHENOXYLATE HYDROCHLORIDE AND ATROPINE SULFATE 2.5; .025 MG/1; MG/1
1 TABLET ORAL DAILY
COMMUNITY

## 2025-04-07 NOTE — PRE-PROCEDURE INSTRUCTIONS
Pre-Surgery Instructions:   Medication Instructions    FLUoxetine (PROzac) 20 mg capsule Take day of surgery.    FLUoxetine (PROzac) 40 MG capsule Take day of surgery.    fluticasone (FLONASE) 50 mcg/act nasal spray Take day of surgery.    lamoTRIgine (LaMICtal) 100 mg tablet Take day of surgery.    multivitamin (THERAGRAN) TABS Stop taking 2 days prior to surgery.    rosuvastatin (CRESTOR) 20 MG tablet Take day of surgery.    semaglutide (Rybelsus) 14 MG tablet Hold day of surgery. (Takes daily)   Medication instructions for day of surgery reviewed. Please take all instructed medications with only a sip of water.       You will receive a call one business day prior to surgery with an arrival time and hospital directions. If your surgery is scheduled on a Monday, the hospital will be calling you on the Friday prior to your surgery. If you have not heard from anyone by 8pm, please call the hospital supervisor through the hospital  at [phone]. ([place] [phone] or [place] [phone]).    Do not eat or drink anything after midnight the night before your surgery, including candy, mints, lifesavers, or chewing gum. Do not drink alcohol 24hrs before your surgery. Try not to smoke at least 24hrs before your surgery.       Follow the pre surgery showering instructions as listed in the “My Surgical Experience Booklet” or otherwise provided by your surgeon's office. Do not use a blade to shave the surgical area 1 week before surgery. It is okay to use a clean electric clippers up to 24 hours before surgery. Do not apply any lotions, creams, including makeup, cologne, deodorant, or perfumes after showering on the day of your surgery. Do not use dry shampoo, hair spray, hair gel, or any type of hair products.     No contact lenses, eye make-up, or artificial eyelashes. Remove nail polish, including gel polish, and any artificial, gel, or acrylic nails if possible. Remove all jewelry including rings and body  piercing jewelry.     Wear causal clothing that is easy to take on and off. Consider your type of surgery.    Keep any valuables, jewelry, piercings at home. Please bring any specially ordered equipment (sling, braces) if indicated.    Arrange for a responsible person to drive you to and from the hospital on the day of your surgery. Please confirm the visitor policy for the day of your procedure when you receive your phone call with an arrival time.     Call the surgeon's office with any new illnesses, exposures, or additional questions prior to surgery.    Please reference your “My Surgical Experience Booklet” for additional information to prepare for your upcoming surgery.    Aware of Fleet enema prior to surgery as instructed by surgeon's office.    Female

## 2025-04-08 ENCOUNTER — TELEPHONE (OUTPATIENT)
Age: 60
End: 2025-04-08

## 2025-04-08 NOTE — TELEPHONE ENCOUNTER
VM to change patient's appointment from 5/9/25 to 5/16/25  at 1:40 p.m. with Dr Cruz at [facility].

## 2025-04-10 ENCOUNTER — ANESTHESIA (OUTPATIENT)
Dept: PERIOP | Facility: HOSPITAL | Age: 60
End: 2025-04-10
Payer: COMMERCIAL

## 2025-04-10 ENCOUNTER — ANESTHESIA EVENT (OUTPATIENT)
Dept: PERIOP | Facility: HOSPITAL | Age: 60
End: 2025-04-10
Payer: COMMERCIAL

## 2025-04-10 ENCOUNTER — HOSPITAL ENCOUNTER (OUTPATIENT)
Facility: HOSPITAL | Age: 60
Setting detail: OUTPATIENT SURGERY
Discharge: HOME | End: 2025-04-10
Attending: SURGERY | Admitting: SURGERY
Payer: COMMERCIAL

## 2025-04-10 VITALS
HEART RATE: 70 BPM | OXYGEN SATURATION: 96 % | WEIGHT: 200 LBS | RESPIRATION RATE: 16 BRPM | DIASTOLIC BLOOD PRESSURE: 63 MMHG | TEMPERATURE: 98.4 F | HEIGHT: 68 IN | BODY MASS INDEX: 30.31 KG/M2 | SYSTOLIC BLOOD PRESSURE: 109 MMHG

## 2025-04-10 DIAGNOSIS — A63.0 ANAL CONDYLOMA: Primary | ICD-10-CM

## 2025-04-10 DIAGNOSIS — A63.0 CONDYLOMA: ICD-10-CM

## 2025-04-10 LAB
GLUCOSE SERPL-MCNC: 107 MG/DL (ref 65–140)
GLUCOSE SERPL-MCNC: 128 MG/DL (ref 65–140)

## 2025-04-10 PROCEDURE — 88305 TISSUE EXAM BY PATHOLOGIST: CPT | Performed by: PATHOLOGY

## 2025-04-10 PROCEDURE — 82948 REAGENT STRIP/BLOOD GLUCOSE: CPT

## 2025-04-10 PROCEDURE — 46922 EXCISION OF ANAL LESION(S): CPT | Performed by: SURGERY

## 2025-04-10 PROCEDURE — 46910 DESTRUCTION ANAL LESION(S): CPT | Performed by: SURGERY

## 2025-04-10 PROCEDURE — 99024 POSTOP FOLLOW-UP VISIT: CPT | Performed by: SURGERY

## 2025-04-10 PROCEDURE — 88344 IMHCHEM/IMCYTCHM EA MLT ANTB: CPT | Performed by: PATHOLOGY

## 2025-04-10 RX ORDER — PROPOFOL 10 MG/ML
INJECTION, EMULSION INTRAVENOUS AS NEEDED
Status: DISCONTINUED | OUTPATIENT
Start: 2025-04-10 | End: 2025-04-10

## 2025-04-10 RX ORDER — ONDANSETRON 2 MG/ML
4 INJECTION INTRAMUSCULAR; INTRAVENOUS ONCE AS NEEDED
Status: DISCONTINUED | OUTPATIENT
Start: 2025-04-10 | End: 2025-04-10 | Stop reason: HOSPADM

## 2025-04-10 RX ORDER — ALBUTEROL SULFATE 0.83 MG/ML
2.5 SOLUTION RESPIRATORY (INHALATION) ONCE AS NEEDED
Status: DISCONTINUED | OUTPATIENT
Start: 2025-04-10 | End: 2025-04-10 | Stop reason: HOSPADM

## 2025-04-10 RX ORDER — IPRATROPIUM BROMIDE AND ALBUTEROL SULFATE 2.5; .5 MG/3ML; MG/3ML
3 SOLUTION RESPIRATORY (INHALATION) ONCE
Status: COMPLETED | OUTPATIENT
Start: 2025-04-10 | End: 2025-04-10

## 2025-04-10 RX ORDER — LIDOCAINE HYDROCHLORIDE 10 MG/ML
INJECTION, SOLUTION EPIDURAL; INFILTRATION; INTRACAUDAL; PERINEURAL AS NEEDED
Status: DISCONTINUED | OUTPATIENT
Start: 2025-04-10 | End: 2025-04-10 | Stop reason: HOSPADM

## 2025-04-10 RX ORDER — HYDROMORPHONE HCL IN WATER/PF 6 MG/30 ML
0.2 PATIENT CONTROLLED ANALGESIA SYRINGE INTRAVENOUS
Status: DISCONTINUED | OUTPATIENT
Start: 2025-04-10 | End: 2025-04-10 | Stop reason: HOSPADM

## 2025-04-10 RX ORDER — DEXAMETHASONE SODIUM PHOSPHATE 10 MG/ML
INJECTION, SOLUTION INTRAMUSCULAR; INTRAVENOUS AS NEEDED
Status: DISCONTINUED | OUTPATIENT
Start: 2025-04-10 | End: 2025-04-10

## 2025-04-10 RX ORDER — LIDOCAINE HYDROCHLORIDE 10 MG/ML
INJECTION, SOLUTION EPIDURAL; INFILTRATION; INTRACAUDAL; PERINEURAL AS NEEDED
Status: DISCONTINUED | OUTPATIENT
Start: 2025-04-10 | End: 2025-04-10

## 2025-04-10 RX ORDER — FENTANYL CITRATE/PF 50 MCG/ML
25 SYRINGE (ML) INJECTION
Status: DISCONTINUED | OUTPATIENT
Start: 2025-04-10 | End: 2025-04-10 | Stop reason: HOSPADM

## 2025-04-10 RX ORDER — OXYCODONE HYDROCHLORIDE 5 MG/1
5 TABLET ORAL EVERY 4 HOURS PRN
Qty: 10 TABLET | Refills: 0 | Status: SHIPPED | OUTPATIENT
Start: 2025-04-10

## 2025-04-10 RX ORDER — IPRATROPIUM BROMIDE AND ALBUTEROL SULFATE 2.5; .5 MG/3ML; MG/3ML
SOLUTION RESPIRATORY (INHALATION)
Status: COMPLETED
Start: 2025-04-10 | End: 2025-04-10

## 2025-04-10 RX ORDER — FENTANYL CITRATE 50 UG/ML
INJECTION, SOLUTION INTRAMUSCULAR; INTRAVENOUS AS NEEDED
Status: DISCONTINUED | OUTPATIENT
Start: 2025-04-10 | End: 2025-04-10

## 2025-04-10 RX ORDER — SODIUM CHLORIDE, SODIUM LACTATE, POTASSIUM CHLORIDE, CALCIUM CHLORIDE 600; 310; 30; 20 MG/100ML; MG/100ML; MG/100ML; MG/100ML
125 INJECTION, SOLUTION INTRAVENOUS CONTINUOUS
Status: DISCONTINUED | OUTPATIENT
Start: 2025-04-10 | End: 2025-04-11 | Stop reason: HOSPADM

## 2025-04-10 RX ORDER — SODIUM CHLORIDE, SODIUM LACTATE, POTASSIUM CHLORIDE, CALCIUM CHLORIDE 600; 310; 30; 20 MG/100ML; MG/100ML; MG/100ML; MG/100ML
INJECTION, SOLUTION INTRAVENOUS CONTINUOUS PRN
Status: DISCONTINUED | OUTPATIENT
Start: 2025-04-10 | End: 2025-04-10

## 2025-04-10 RX ORDER — ONDANSETRON 2 MG/ML
INJECTION INTRAMUSCULAR; INTRAVENOUS AS NEEDED
Status: DISCONTINUED | OUTPATIENT
Start: 2025-04-10 | End: 2025-04-10

## 2025-04-10 RX ORDER — MIDAZOLAM HYDROCHLORIDE 2 MG/2ML
INJECTION, SOLUTION INTRAMUSCULAR; INTRAVENOUS AS NEEDED
Status: DISCONTINUED | OUTPATIENT
Start: 2025-04-10 | End: 2025-04-10

## 2025-04-10 RX ADMIN — FENTANYL CITRATE 50 MCG: 50 INJECTION INTRAMUSCULAR; INTRAVENOUS at 19:16

## 2025-04-10 RX ADMIN — FENTANYL CITRATE 50 MCG: 50 INJECTION INTRAMUSCULAR; INTRAVENOUS at 19:28

## 2025-04-10 RX ADMIN — LIDOCAINE HYDROCHLORIDE 50 MG: 10 INJECTION, SOLUTION EPIDURAL; INFILTRATION; INTRACAUDAL; PERINEURAL at 19:16

## 2025-04-10 RX ADMIN — ONDANSETRON 4 MG: 2 INJECTION INTRAMUSCULAR; INTRAVENOUS at 19:20

## 2025-04-10 RX ADMIN — IPRATROPIUM BROMIDE AND ALBUTEROL SULFATE 3 ML: 2.5; .5 SOLUTION RESPIRATORY (INHALATION) at 15:43

## 2025-04-10 RX ADMIN — SODIUM CHLORIDE, SODIUM LACTATE, POTASSIUM CHLORIDE, AND CALCIUM CHLORIDE 125 ML/HR: .6; .31; .03; .02 INJECTION, SOLUTION INTRAVENOUS at 15:20

## 2025-04-10 RX ADMIN — IPRATROPIUM BROMIDE AND ALBUTEROL SULFATE 3 ML: .5; 3 SOLUTION RESPIRATORY (INHALATION) at 15:43

## 2025-04-10 RX ADMIN — PROPOFOL 200 MG: 10 INJECTION, EMULSION INTRAVENOUS at 19:16

## 2025-04-10 RX ADMIN — MIDAZOLAM 2 MG: 1 INJECTION INTRAMUSCULAR; INTRAVENOUS at 18:59

## 2025-04-10 RX ADMIN — DEXAMETHASONE SODIUM PHOSPHATE 10 MG: 10 INJECTION, SOLUTION INTRAMUSCULAR; INTRAVENOUS at 19:20

## 2025-04-10 NOTE — H&P
History and Physical   Colon and Rectal Surgery   Cleveland Serrano 60 y.o. male MRN: [id number]  Unit/Bed#: OR [place] Encounter: [id number]  04/10/25   6:15 PM      ASSESSMENT:  Cleveland Serrano is a 60 y.o. male who presents for excision of condyloma.  Discussed in a face-to-face, personal, informed consent process, the benefits, alternatives, risks including not limited to bleeding, infection, thromboembolic disease discussed/understood, signed consent.    PLAN:  To OR for EUA, excision of condyloma    CHIEF COMPLAINT:  condyloma      History of Present Illness   HPI:  Cleveland Serrano is a 60 y.o. male who presents for excision of condyloma.  Denies nausea/vomiting/abdominal pain, change in bowel habits, rectal bleeding, or other constitutional symptoms.       Historical Information   Past Medical History:   Diagnosis Date    Anxiety     CPAP (continuous positive airway pressure) dependence     not since inspire device    Depression     Diabetes mellitus (HCC)     Sleep apnea     hx-had Inspire implant     Past Surgical History:   Procedure Laterality Date    COLONOSCOPY      MULTIPLE TOOTH EXTRACTIONS      WA OPEN IMPLTJ HPGLSL NRV NSTIM RA PG&RESPIR SENSOR N/A 04/11/2024    Procedure: INSERTION UPPER AIRWAY STIMULATOR;  Surgeon: Jose Tyson MD;  Location: [facility];  Service: ENT    WA PALATOPHARYNGOPLASTY N/A 04/07/2022    Procedure: UVULOPALATOPHARYNGOPLASTY (UPPP);  Surgeon: Jose Tyson MD;  Location: [facility];  Service: ENT    TESTICLE SURGERY Left        Meds/Allergies       Medications Prior to Admission:     FLUoxetine (PROzac) 20 mg capsule    FLUoxetine (PROzac) 40 MG capsule    fluticasone (FLONASE) 50 mcg/act nasal spray    lamoTRIgine (LaMICtal) 100 mg tablet    lamoTRIgine (LaMICtal) 25 mg tablet    multivitamin (THERAGRAN) TABS    rosuvastatin (CRESTOR) 20 MG tablet    semaglutide (Rybelsus) 14 MG tablet    imiquimod (ALDARA) 5 % cream    nicotine (NICODERM CQ) 14 mg/24hr TD 24 hr patch     "nicotine (NICODERM CQ) 7 mg/24hr TD 24 hr patch    sildenafil (VIAGRA) 100 mg tablet      Current Facility-Administered Medications:     lactated ringers infusion, 125 mL/hr, Intravenous, Continuous, Pro Marlow MD, Last Rate: 125 mL/hr at 04/10/25 1520, 125 mL/hr at 04/10/25 1520    No Known Allergies      Social History   Social History     Substance and Sexual Activity   Alcohol Use Not Currently     Social History     Substance and Sexual Activity   Drug Use No     Social History     Tobacco Use   Smoking Status Some Days    Current packs/day: 0.25    Average packs/day: 0.2 packs/day for 45.3 years (11.3 ttl pk-yrs)    Types: Cigarettes    Start date: 1980   Smokeless Tobacco Never         Family History:   Family History   Problem Relation Age of Onset    Anxiety disorder Father         Previously on Valium    Depression Father          Objective     Current Vitals:   Blood Pressure: 119/69 (04/10/25 1510)  Pulse: 69 (04/10/25 1510)  Temperature: (!) 96.4 °F (35.8 °C) (04/10/25 1510)  Temp Source: Temporal (04/10/25 1510)  Respirations: 18 (04/10/25 1510)  Height: 5' 8\" (172.7 cm) (04/10/25 1510)  Weight - Scale: 90.7 kg (200 lb) (04/10/25 1510)  SpO2: 97 % (04/10/25 1510)  No intake or output data in the 24 hours ending 04/10/25 1815    Physical Exam:  General:no distress  Eyes:perrla/eomi  ENT:moist mucus membranes  Neck:supple  Pulm:no increased work of breathing, clear to auscultation bilaterally  CV:sinus  Abdomen:soft,nontender  Extremities:no edema    "

## 2025-04-10 NOTE — OP NOTE
OPERATIVE REPORT  PATIENT NAME: Cleveland Serrano    :  1965  MRN: [id number]  Pt Location: [facility] OR ROOM 06    SURGERY DATE: 4/10/2025    Surgeons and Role:     * Liliana Cruz MD - Primary     * Alonso Harvey MD - Assisting    Preop Diagnosis:  Condyloma [A63.0]    Post-Op Diagnosis Codes:     * Condyloma [A63.0]    Procedure(s):  EXCISION CONDYLOMA ANAL/RECTAL. ANAL BIOPSY    Specimen(s):  ID Type Source Tests Collected by Time Destination   1 : Anal condyloma Tissue Soft Tissue, Other TISSUE EXAM Liliana Cruz MD 4/10/2025 1933        Estimated Blood Loss:   Minimal    Drains:  * No LDAs found *    Anesthesia Type:   IV Sedation with Anesthesia    Operative Indications:  Condyloma [A63.0]    Operative Findings:  Large, circumferential condylomatous lesions of perianal skin extending towards the anal canal    Complications:   None    Procedure and Technique:  The patient was identified by name and armband in the preoperative holding area. Informed consent was reviewed and confirmed.  He was then taken to the operating room, where general anesthesia was induced, and an LMA was placed by the anesthesiology team.  He was placed in the lithotomy position with all pressure points padded.  The perianal skin was then prepped and draped in the usual sterile fashion. Preoperative doses of subcutaneous heparin and antibiotics were not indicated. A brief timeout was called. All in the room were in agreement.    A bilateral perianal and pudendal nerve block was performed using 20 cc each of 1% plain lidocaine and plain Exparel.  External inspection revealed large condylomatous lesions bilaterally.  A medium Hill-Stearns retractor was used to inspect the anal canal.  There were no anal canal lesions, although some of the perianal skin lesions extended towards the anal canal.  The largest lesions, located in the right anterior perianal skin extending towards the anal canal, were excised and sent for  biopsy.  The remainder of the area was fulgurated with electrocautery, taking care to leave skin bridges to avoid anal stenosis.  Hemostasis was carefully ensured.  The area was then cleansed and patted dry.  It was dressed with gauze and mesh underwear.    All sponge, instrument, and needle counts were correct x 2.     I was present for the entire procedure.    Patient Disposition:  PACU              SIGNATURE: Liliana Cruz MD  DATE: April 10, 2025  TIME: 7:47 PM

## 2025-04-10 NOTE — ANESTHESIA POSTPROCEDURE EVALUATION
Post-Op Assessment Note    CV Status:  Stable    Pain management: adequate       Mental Status:  Awake   Hydration Status:  Stable   PONV Controlled:  Controlled   Airway Patency:  Patent     Post Op Vitals Reviewed: Yes    No anethesia notable event occurred.    Staff: Anesthesiologist, CRNA           Last Filed PACU Vitals:  Vitals Value Taken Time   Temp 97.8    Pulse 73 04/10/25 1957   /70    Resp 26 04/10/25 1957   SpO2 99 % 04/10/25 1957   Vitals shown include unfiled device data.

## 2025-04-10 NOTE — ANESTHESIA PREPROCEDURE EVALUATION
Procedure:  EXCISION CONDYLOMA ANAL/RECTAL (Anus)    Relevant Problems   CARDIO   (+) Mixed hyperlipidemia      ENDO   (+) Type 2 diabetes mellitus with other specified complication, without long-term current use of insulin (HCC)      HEMATOLOGY   (+) Iron deficiency anemia      NEURO/PSYCH   (+) Anxiety   (+) Mild episode of recurrent major depressive disorder (HCC)      PULMONARY   (+) CECILIA (obstructive sleep apnea)   (+) Obstructive sleep apnea        Physical Exam    Airway    Mallampati score: II  TM Distance: >3 FB  Neck ROM: full     Dental   No notable dental hx     Cardiovascular  Cardiovascular exam normal    Pulmonary   Wheezes    Other Findings        Anesthesia Plan  ASA Score- 2     Anesthesia Type- general with ASA Monitors.         Additional Monitors:     Airway Plan: ETT.    Comment: Daily rybelsus. Did not take today. No GI sx  Wheezing L side. Duonebs ordered. No sx  Inspire device off.       Plan Factors-Exercise tolerance (METS): >4 METS.    Chart reviewed. EKG reviewed.  Existing labs reviewed. Patient summary reviewed.    Patient is a current smoker.  Patient did not smoke on day of surgery.            Induction- intravenous.    Postoperative Plan- Plan for postoperative opioid use.         Informed Consent- Anesthetic plan and risks discussed with patient.  I personally reviewed this patient with the CRNA. Discussed and agreed on the Anesthesia Plan with the CRNA..      NPO Status:  Vitals Value Taken Time   Date of last liquid 04/10/25 04/10/25 1511   Time of last liquid 0630 04/10/25 1511   Date of last solid 04/09/25 04/10/25 1511   Time of last solid 1800 04/10/25 1511

## 2025-04-10 NOTE — DISCHARGE INSTR - AVS FIRST PAGE
Post-Operative Care Information      Resume high fiber diet. Fiber supplementation with Metamucil or Konsyl also recommended daily.       Your first bowel movement may take up to 3 days after surgery. THIS IS OFTEN PAINFUL.      While taking narcotic pain medication, you should remain on an over-the-counter stool softener such as Colace (one tablet twice daily). For constipation Miralax can be taken up to three times daily.     Keep a clean, dry gauze over the wound if you have bleeding or drainage. It is OK to shower. Clean the area with only water and avoid soaps, lotions, and ointments in the area.    Pain is to be expected after surgery. Ibuprofen (400?600 mg) every 6?8 hours and Tylenol (650 mg) every 6 hours is an excellent alternative in addition or as a substitute to your narcotic pain medication.     Rectal bleeding or drainage is normal after surgery.       Nausea is a common complaint post op. This can be associated with the narcotic pain medications, anesthesia as well as with severe constipation.     Driving may be resumed when all off all narcotic pain medications and you can turn or twist your body without hesitation.    If you are unable to urinate within 8 hours after surgery, please call the office at [phone]    Frequent warm tub soaks or warm showers are often soothing, we suggest 3 to 4 times daily.    After bowel movements, you may wash with a hand shower or warm tub soak.    No strenuous activity (i.e., Running, jogging, swimming, or weightlifting) for up to one week after surgery.     When will I receive follow-up care?      You should be scheduled for a post-op appointment within 6-8 weeks after surgery, unless otherwise instructed on your discharge summary. If you do not have an existing appointment at the time of discharge, please call our office at [phone].    Call the office at [phone] immediately if you have a fever, chills, excessive sweating, difficulty urinating, or  excessive/profuse bleeding with clots.

## 2025-04-11 NOTE — ANESTHESIA POSTPROCEDURE EVALUATION
Post-Op Assessment Note    CV Status:  Stable    Pain management: adequate       Mental Status:  Awake   Hydration Status:  Stable   PONV Controlled:  Controlled   Airway Patency:  Patent     Post Op Vitals Reviewed: Yes    No anethesia notable event occurred.    Staff: Anesthesiologist, CRNA           Last Filed PACU Vitals:  Vitals Value Taken Time   Temp 97.8    Pulse 73 04/10/25 1957   /70    Resp 26 04/10/25 1957   SpO2 99 % 04/10/25 1957   Vitals shown include unfiled device data.    Modified Iesha:     Vitals Value Taken Time   Activity 2 04/10/25 2030   Respiration 2 04/10/25 2030   Circulation 2 04/10/25 2030   Consciousness 2 04/10/25 2030   Oxygen Saturation 2 04/10/25 2030     Modified Iesha Score: 10

## 2025-04-16 PROCEDURE — 88344 IMHCHEM/IMCYTCHM EA MLT ANTB: CPT | Performed by: PATHOLOGY

## 2025-04-16 PROCEDURE — 88305 TISSUE EXAM BY PATHOLOGIST: CPT | Performed by: PATHOLOGY

## 2025-04-21 ENCOUNTER — OFFICE VISIT (OUTPATIENT)
Dept: PSYCHIATRY | Facility: CLINIC | Age: 60
End: 2025-04-21
Payer: COMMERCIAL

## 2025-04-21 DIAGNOSIS — R45.86 MOOD SWINGS: ICD-10-CM

## 2025-04-21 DIAGNOSIS — F33.0 MILD EPISODE OF RECURRENT MAJOR DEPRESSIVE DISORDER (HCC): Primary | ICD-10-CM

## 2025-04-21 PROBLEM — F41.9 ANXIETY: Status: RESOLVED | Noted: 2020-06-29 | Resolved: 2025-04-21

## 2025-04-21 PROCEDURE — 90833 PSYTX W PT W E/M 30 MIN: CPT | Performed by: STUDENT IN AN ORGANIZED HEALTH CARE EDUCATION/TRAINING PROGRAM

## 2025-04-21 PROCEDURE — 99213 OFFICE O/P EST LOW 20 MIN: CPT | Performed by: STUDENT IN AN ORGANIZED HEALTH CARE EDUCATION/TRAINING PROGRAM

## 2025-04-21 RX ORDER — LAMOTRIGINE 100 MG/1
100 TABLET ORAL DAILY
Qty: 90 TABLET | Refills: 1 | Status: SHIPPED | OUTPATIENT
Start: 2025-04-21

## 2025-04-21 NOTE — BH TREATMENT PLAN
TREATMENT PLAN (Medication Management Only)        [facility]      Name and Date of Birth:  Cleveland Serrano 60 y.o. 1965 MRN: [id number]    Date of Visit: April 21, 2025    Diagnosis/Diagnoses:    1. Mild episode of recurrent major depressive disorder (HCC)  lamoTRIgine (LaMICtal) 100 mg tablet      2. Mood swings  lamoTRIgine (LaMICtal) 100 mg tablet          Strengths/Personal Resources for Self-Care: taking medications as prescribed, ability to adapt to life changes, ability to communicate needs, ability to communicate well, ability to listen, ability to reason, ability to understand psychiatric illness, average or above intelligence, ability to negotiate basic needs, being resoureceful, self-reliance, sense of humor, special hobby/interest, stable employment, willingness to work on problems.    Area/Areas of need (in own words): mood swings    1. Long Term Goal: maintain control of mood.  Target Date:6 months - 10/21/2025  Person/Persons responsible for completion of goal: Cleveland    2.  Short Term Objective (s) - How will we reach this goal?:   A. Provider new recommended medication/dosage changes and/or continue medication(s): continue current medications as prescribed.  B. Keep regularly scheduled psychiatric appointments  C. Maintain adherence to psychotropic medication regimen   D. Exercise regularly (at least 30 mins)  E. Maintain appropriate dietary intake  F. Practice adequate sleep hygiene      Target Date:6 months - 10/21/2025  Person/Persons Responsible for Completion of Goal: Cleveland    Progress Towards Goals: continuing treatment    Treatment Modality: medication management every 3 months    Review due 180 days from date of this plan: 6 months - 10/21/2025  Expected length of service: ongoing treatment    My Physician/PA/NP and I have developed this plan together and I agree to work on the goals and objectives. I understand the treatment goals that  were developed for my treatment. Treatment Plan was completed with Cleveland's assistance and input throughout today's session. Cleveland consented to the information provided and documented above. Unfortunately, treatment plan was not physically signed at the time of this office visit secondary to time constraints and issues related to signature keypad. Again, Cleveland did verbally consent.

## 2025-04-21 NOTE — PSYCH
"MEDICATION MANAGEMENT NOTE        [facility] MENTAL HEALTH SERVICES      Name and Date of Birth:  Cleveland Serrano 60 y.o. 1965 MRN: [id number]    Date of Visit: April 21, 2025    Reason for Visit: Follow-up visit for medication management       SUBJECTIVE:    Cleveland Serrano is a 60 y.o. male with past psychiatric history significant for major depressive disorder, complicated bereheavement, and nicotine use disorder who was personally seen and evaluated today at the [facility] outpatient clinic for follow-up and medication management. Cleveland presents as calm, pleasant, and cooperative. His thoughts are linear and organized. He completes assessment without difficulty.     Cleveland endorses compliance with psychotropic medication regimen consisting of Prozac and Lamictal. He denies adverse medication side effects. Cleveland's Lamictal was optimized following last visit. He tolerated this change well. He reports feeling \"better\". His mood is more stable. He feels more in control of episodic mood lability. He denies recent outbursts or aggression. He is less bothered by his spouses \"annoying behaviors\" and feels like fixated on his neighbor's devaluing comments. Cleveland is happy to share that he received a bonus at work and given more work related responsibilities (potentially some work from home options and traveling) given how meticulous he is. He was applauded for his efforts. At the moment, Cleveland denies psychiatric concerns. His sleep and appetite are \"good\". No current SI/HI. His energy and motivation have been more limited secondary to the weather and his GF's behavior. He once again speaks at length today regarding the way she belittles him. Supportive therapy and CBT used. He denies anhedonia, crying spells, or worthlessness. He was able to speak to his son via telephone yesterday (Belinda) which was enjoyable. Cleveland currently endorses occasional and appropriate anxiety " "that is not pathologic in nature. Cleveland denies current periods of excessive nervousness, irrational worry, or overt anxiousness. Cleveland is not actively restless or tense nor does Cleveland feel \"keyed up\" or on-edge. Cleveland denies new-onset panic symptomatology or maladaptive behaviors. Throughout today's session, Cleveland does not appear visibly perturbed. Acutely, Cleveland does not exhibit objective evidence of misael/hypomania or psychosis. Cleveland is not currently irritable, grandiose, labile, or pathologically euphoric. Cleveland is without perceptual disturbances, such as A/V hallucinations, paranoia, ideas of reference, or delusional beliefs. Cleveland denies recent ETOH or illicit substance abuse. Cleveland offers no further concerns.     Current Rating Scores:     None completed today.    Review Of Systems:      Constitutional fluctuating energy level   ENT negative   Cardiovascular negative   Respiratory negative   Gastrointestinal negative   Genitourinary negative   Musculoskeletal negative   Integumentary negative   Neurological negative   Endocrine negative   Other Symptoms none, all other systems are negative       Past Psychiatric History: (unchanged information from previous note copied and italicized) - Information that is bolded has been updated.      Inpatient psychiatric admissions: Denies  Prior outpatient psychiatric linkage: Denies - received Sertraline from PCP  Past/current psychotherapy: Denies  History of suicidal attempts/gestures: Denies  History of violence/aggressive behaviors: was court-mandated to \"anger management\" classes - completed the 6 week program in July 2022  Psychotropic medication trials: Sertraline 100mg Daily (experiencing ED), Wellbutrin (over activated, more anxious), Trintellix, Prozac (now), Abilify (only took 5mg for several days), Lamictal  Substance abuse inpatient/outpatient rehabilitation: Denies     Substance Abuse History: (unchanged information from previous note " copied and italicized) - Information that is bolded has been updated.      No history of ETOH or illict substance. He does endorse a use of tobacco abuse (previous 2pack/day smoker). No past legal actions or arrests secondary to substance intoxication. The patient denies prior DWIs/DUIs. No history of outpatient/inpatient rehabilitation programs. Cleveland does not exhibit objective evidence of substance withdrawal during today's examination nor does Cleveland appear under the influence of any psychoactive substance.          Social History: (unchanged information from previous note copied and italicized) - Information that is bolded has been updated.      Developmental: Denies a history of milestone/developmental delay. Denies a history of in-utero exposure to toxins/illicit substances. There is no documented history of IEP or need for special education.  Education: high school diploma/GED - graduated from [employer]  Marital history:  - He had 2 sons with his ex-wife. He is now involved in a new relationship.   Living arrangement, social support: significant other. He is still in contact with his younger son (30 years old)  Occupational History: Employed as a  - works 5 days per week (2 days, 2 nights, and 1 midday shift)  Access to firearms: Denies direct access to weapons/firearms. Cleveland Serrano has no history of arrests or violence with a deadly weapon.         Traumatic History: (unchanged information from previous note copied and italicized) - Information that is bolded has been updated.      Abuse:none is reported  Other Traumatic Events: Tragically lost his son to a MVA and his mother to cancer in 2018    Past Medical History:    Past Medical History:   Diagnosis Date    Anxiety     CPAP (continuous positive airway pressure) dependence     not since inspire device    Depression     Diabetes mellitus (HCC)     Sleep apnea     hx-had Inspire implant        Past Surgical History:    Procedure Laterality Date    COLONOSCOPY      MULTIPLE TOOTH EXTRACTIONS      DE DSTRJ LESION ANUS EXTENSIVE N/A 4/10/2025    Procedure: EXCISION CONDYLOMA ANAL/RECTAL, ANAL BIOPSY;  Surgeon: Liliana Cruz MD;  Location: [facility];  Service: Colorectal    DE OPEN IMPLTJ HPGLSL NRV NSTIM RA PG&RESPIR SENSOR N/A 04/11/2024    Procedure: INSERTION UPPER AIRWAY STIMULATOR;  Surgeon: Jose Tyson MD;  Location: [facility];  Service: ENT    DE PALATOPHARYNGOPLASTY N/A 04/07/2022    Procedure: UVULOPALATOPHARYNGOPLASTY (UPPP);  Surgeon: Jose Tyson MD;  Location: [facility];  Service: ENT    TESTICLE SURGERY Left      No Known Allergies    Substance Abuse History:    Social History     Substance and Sexual Activity   Alcohol Use Not Currently     Social History     Substance and Sexual Activity   Drug Use No       Social History:    Social History     Socioeconomic History    Marital status: Single     Spouse name: Not on file    Number of children: Not on file    Years of education: Not on file    Highest education level: Not on file   Occupational History    Not on file   Tobacco Use    Smoking status: Some Days     Current packs/day: 0.25     Average packs/day: 0.3 packs/day for 45.3 years (11.3 ttl pk-yrs)     Types: Cigarettes     Start date: 1980    Smokeless tobacco: Never   Vaping Use    Vaping status: Never Used   Substance and Sexual Activity    Alcohol use: Not Currently    Drug use: No    Sexual activity: Yes     Partners: Female   Other Topics Concern    Not on file   Social History Narrative    Not on file     Social Drivers of Health     Financial Resource Strain: Not on file   Food Insecurity: Not on file   Transportation Needs: Not on file   Physical Activity: Not on file   Stress: Not on file   Social Connections: Not on file   Intimate Partner Violence: Not on file   Housing Stability: Not on file       Family Psychiatric History:     Family History   Problem Relation Age of  Onset    Anxiety disorder Father         Previously on Valium    Depression Father        History Review: The following portions of the patient's history were reviewed and updated as appropriate: allergies, current medications, past family history, past medical history, past social history, past surgical history, and problem list.         OBJECTIVE:     Vital signs in last 24 hours:    There were no vitals filed for this visit.    Mental Status Evaluation:    Appearance age appropriate, casually dressed, dressed appropriately, looks stated age   Behavior pleasant, cooperative, calm, good eye contact   Speech normal rate, normal volume, normal pitch   Mood euthymic   Affect normal range and intensity, appropriate   Thought Processes organized, goal directed, normal rate of thoughts, normal abstract reasoning   Associations intact associations   Thought Content no overt delusions   Perceptual Disturbances: no auditory hallucinations, no visual hallucinations   Abnormal Thoughts  Risk Potential Suicidal ideation - None at present  Homicidal ideation - None at present  Potential for aggression - No   Orientation oriented to person, place, time/date, and situation   Memory recent and remote memory grossly intact   Consciousness alert and awake   Attention Span Concentration Span attention span and concentration are age appropriate   Intellect appears to be of average intelligence   Insight intact and good   Judgement intact and good   Muscle Strength and  Gait normal gait and normal balance   Motor activity no abnormal movements   Language no difficulty naming common objects   Fund of Knowledge adequate knowledge of current events   Pain none   Pain Scale Did not ask patient to formally rate       Laboratory Results: I have personally reviewed all pertinent laboratory/tests results    Recent Labs:   Admission on 04/10/2025, Discharged on 04/10/2025   Component Date Value    POC Glucose 04/10/2025 128     Case Report  04/10/2025                      Value:Surgical Pathology Report                         Case: [id number]                                  Authorizing Provider:  Liliana Cruz, Collected:           04/10/2025 Milton OWEN                                                                           Ordering Location:     [facility]      Received:            04/10/2025 2022                                     Hospital Operating Room                                                      Pathologist:           Penelope Harper MD                                                                 Specimen:    Soft Tissue, Other, Anal condyloma                                                         Final Diagnosis 04/10/2025                      Value:A. 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  - Negative for carcinoma.  - Resection margin is negative for high grade dysplasia.     Comment: Immunoperoxidase stain for p16/Ki-67 was performed after microscopic examination on block A4, supporting the above diagnosis.        Note 04/10/2025                      Value:  Interpretation performed at [facility], [address] [place] [id number]        Additional Information 04/10/2025                      Value:All reported additional testing was performed with appropriately reactive controls.  These tests were developed and their performance characteristics determined by [facility] or appropriate performing facility, though some tests may be performed on tissues which have not been validated for performance characteristics (such as staining performed on alcohol exposed cell blocks and decalcified tissues).  Results should be interpreted with caution and in the context of the patients’ clinical condition. These tests may not be  "cleared or approved by the U.S. Food and Drug Administration, though the FDA has determined that such clearance or approval is not necessary. These tests are used for clinical purposes and they should not be regarded as investigational or for research. This laboratory has been approved by CLIA 88, designated as a high-complexity laboratory and is qualified to perform these tests.  .      Gross Description 04/10/2025                      Value:A. The specimen is received in formalin, labeled with the patient's name and hospital number, and is designated \" anal condyloma\".  The specimen consists of 3 fragments of tan-gray verrucous skin/soft tissue measuring in aggregate dimension 3.1 x 2.0 x 1.2 cm.  The apparent margins of resection are inked green.  The portions of skin are serially sectioned perpendicular to the margin revealing tan cut surfaces.  The specimen is entirely submitted as follows:    1-2: 1 portion of skin, serially sectioned  3: 1 portion of skin, serially sectioned  4: 1 portion of skin, serially sectioned    Note: The estimated total formalin fixation time based upon information provided by the submitting clinician and the standard processing schedule is under 72.0 hours.  RRavotti      Clinical Information 04/10/2025                      Value: condyloma    POC Glucose 04/10/2025 107    Appointment on 03/28/2025   Component Date Value    Sodium 03/28/2025 140     Potassium 03/28/2025 4.4     Chloride 03/28/2025 106     CO2 03/28/2025 26     ANION GAP 03/28/2025 8     BUN 03/28/2025 17     Creatinine 03/28/2025 1.06     Glucose, Fasting 03/28/2025 161 (H)     Calcium 03/28/2025 9.2     AST 03/28/2025 15     ALT 03/28/2025 23     Alkaline Phosphatase 03/28/2025 76     Total Protein 03/28/2025 6.9     Albumin 03/28/2025 4.3     Total Bilirubin 03/28/2025 0.41     eGFR 03/28/2025 75     Hemoglobin A1C 03/28/2025 7.7 (H)     EAG 03/28/2025 174     WBC 03/28/2025 10.23 (H)     RBC 03/28/2025 5.09     " Hemoglobin 03/28/2025 15.9     Hematocrit 03/28/2025 48.5     MCV 03/28/2025 95     MCH 03/28/2025 31.2     MCHC 03/28/2025 32.8     RDW 03/28/2025 12.7     MPV 03/28/2025 9.0     Platelets 03/28/2025 397 (H)     nRBC 03/28/2025 0     Segmented % 03/28/2025 69     Immature Grans % 03/28/2025 1     Lymphocytes % 03/28/2025 23     Monocytes % 03/28/2025 6     Eosinophils Relative 03/28/2025 0     Basophils Relative 03/28/2025 1     Absolute Neutrophils 03/28/2025 7.11     Absolute Immature Grans 03/28/2025 0.08     Absolute Lymphocytes 03/28/2025 2.34     Absolute Monocytes 03/28/2025 0.64     Eosinophils Absolute 03/28/2025 0.01     Basophils Absolute 03/28/2025 0.05     Cholesterol 03/28/2025 121     Triglycerides 03/28/2025 153 (H)     HDL, Direct 03/28/2025 39 (L)     LDL Calculated 03/28/2025 51     TSH 3RD GENERATON 03/28/2025 0.884     PSA 03/28/2025 1.639        Suicide/Homicide Risk Assessment:    The following interventions are recommended: continue medication management, no intervention changes needed      Lethality Statement:    Based on today's assessment and clinical criteria, Cleveland Serrano contracts for safety and is not an imminent risk of harm to self or others. Outpatient level of care is deemed appropriate at this current time. Cleveland understands that if they can no longer contract for safety, they need to call the office or report to their nearest Emergency Room for immediate evaluation.      Assessment/Plan:     Cleveland Serrano is a 60 y.o. male with past psychiatric history significant for major depressive disorder, complicated bereheavement, and nicotine use disorder who was personally seen and evaluated today at the [facility] outpatient clinic for follow-up and medication management. Cleveland endorses a neurotypical history without mood symptomatology or anxiety prior to 2020. He denies prior psychiatric treatment or counseling before 2018 and no previous medication  trials. Several years ago, Cleveland's oldest son was killed in a MVA after hydroplaning during a wet evening. Approximately 3-6 months after his death, Cleveland's mother succumbed to a terminal illness (cancer). These two significant losses were the catalyst for new-onset depressive symptomatology. After 2018, Cleveland admits to episodic sadness and intermittent feelings of loss. These symptoms intensified since March/April 2020, likely secondary to COVID-19, mandatory quearentine, and subsequent disconnect from others. In June 2020, during an office visit with his PCP, Cleveland endorsed worsening depression and anxiety and was started on Sertraline which was titrated to 100mg Daily. He endorsed improvement in mood and resolution in daily anxiety, however, he was experiencing sexual dysfunction (erectile dysfunction) which was problematic. As such, he was tapered of Zoloft and Wellbutrin was started which was over-stimulating and induced anxiety. This agent was subsequently discontinued and Cleveland was started on Trintellix, which his insurance did not cover. Today, he remains on Prozac and is tolerating it well.     Today's Plan:     Psychopharmacologically, Cleveland reports benefit and tolerability with current regimen. He denies need for medication change or intervention.         DSM-V Diagnoses:      1.) Major Depressive Episode, recurrent, with anxious distress  2.) Nicotine Use Disorder  3.) Mood Swings        Treatment Recommendations/Precautions:     1.) Major Depressive Episode, recurrent, with anxious distress  - Hx of medication trials including: Sertraline (tolerated well but experience sexual dysfunction), Wellbutrin (too activating)  - Continue Prozac 60mg Daily  - Continue Lamictal 100mg Daily - off label for mood yet evidenced-based, patient is aware and consents to treatment  - Discussed referral for psychotherapy - given packet in past given extensive wait list at [facility]      2.) Nicotine Use Disorder  -  "States that he previously smoked 2 packs per day - he is now smoking less frequently - states today that he can go \"4 days without a cigarette\"  - Completed Rx Chantix by PCP- in January 2022  - Completed hypnosis   - Offered trial of Wellbutrin (this time with SR version rather than XL version) which would be appropriate and likely beneficial for mood, smoking, and struggles with ED, which are not entirely related to SSRI use  - Psychoeducation provided regarding the importance of exercise and healthy dietary choices and their impact on mood, energy, and motivation  - Counseled to avoid ETOH, illict substances, and nicotine secondary to the detrimental effects of these substances on mental and physical health     3.) Mood Swings  - Continue Lamictal 100mg Daily - off label for mood yet evidenced-based, patient is aware and consents to treatment  Assessment & Plan  Mild episode of recurrent major depressive disorder (HCC)    Orders:    lamoTRIgine (LaMICtal) 100 mg tablet; Take 1 tablet (100 mg total) by mouth daily    Mood swings    Orders:    lamoTRIgine (LaMICtal) 100 mg tablet; Take 1 tablet (100 mg total) by mouth daily        Does not want any medication changes  Aware of need to follow up with family physician for medical issues  Aware of 24 hour and weekend coverage for urgent situations accessed by calling [facility] main practice number    Medications Risks/Benefits      Risks, Benefits And Possible Side Effects Of Medications:    Risks, benefits, and possible side effects of medications explained to Cleveland including risk of rash related to treatment with Lamictal and risk of suicidality and serotonin syndrome related to treatment with antidepressants. He verbalizes understanding and agreement for treatment.    Controlled Medication Discussion:     Not applicable    Psychotherapy Provided:     Individual psychotherapy provided: Yes  Counseling was provided during the session today " for 16 minutes.  Medications, treatment progress and treatment plan reviewed with Cleveland.  Medication education provided to Cleveland.  Recent stressors discussed with Cleveland including family problems, family conflict, family issues, relationship problems, job stress, problems at work, health issues, housing issues, limited support, social difficulties, everyday stressors, and occasional anxiety.  Coping strategies reviewed with Cleveland.   Educated on importance of medication and treatment compliance.  Supportive therapy provided.   Cognitive therapy was utilized during the session.     Treatment Plan:    Completed and signed during the session: Yes - with Cleveland      Visit Time    Visit Start Time: 3:30 PM  Visit Stop Time: 4:00 PM  Total Visit Duration:  30 minutes     The total visit duration detailed above includes: patient engagement, medication management, psychotherapy/counseling, discussion regarding treatment goals, documentation, review of past medical records, and coordination of care.      Note Share Disclaimer:     This note was not shared with the patient due to reasonable likelihood of causing patient harm      David Pringle MD  Board Certified Diplomate of the American Board of Psychiatry and Neurology  04/21/25

## 2025-04-25 DIAGNOSIS — E78.2 MIXED HYPERLIPIDEMIA: ICD-10-CM

## 2025-04-25 RX ORDER — ROSUVASTATIN CALCIUM 20 MG/1
20 TABLET, COATED ORAL DAILY
Qty: 90 TABLET | Refills: 1 | Status: SHIPPED | OUTPATIENT
Start: 2025-04-25

## 2025-04-25 NOTE — TELEPHONE ENCOUNTER
Reason for call:   [x] Refill   [] Prior Auth  [] Other:     Office:   [x] PCP/Provider - Dg Blackman MD   [] Specialty/Provider -     Medication: rosuvastatin (CRESTOR) 20 MG tablet     Dose/Frequency: Take 1 tablet (20 mg total) by mouth daily,     Quantity: 90    Pharmacy: [address] [phone]    Local Pharmacy   Does the patient have enough for 3 days?   [x] Yes   [] No - Send as HP to POD    Mail Away Pharmacy   Does the patient have enough for 10 days?   [x] Yes   [] No - Send as HP to POD

## 2025-05-16 ENCOUNTER — OFFICE VISIT (OUTPATIENT)
Age: 60
End: 2025-05-16

## 2025-05-16 VITALS — DIASTOLIC BLOOD PRESSURE: 64 MMHG | BODY MASS INDEX: 27.73 KG/M2 | SYSTOLIC BLOOD PRESSURE: 110 MMHG | WEIGHT: 182.4 LBS

## 2025-05-16 DIAGNOSIS — A63.0 CONDYLOMA: Primary | ICD-10-CM

## 2025-05-16 PROCEDURE — 99024 POSTOP FOLLOW-UP VISIT: CPT | Performed by: SURGERY

## 2025-05-16 NOTE — PROGRESS NOTES
Name: Cleveland Serrano      : 1965      MRN: [id number]  Encounter Provider: Liliana Cruz MD  Encounter Date: 2025   [facility] [place]  :  Assessment & Plan  Condyloma  Discussed pathology results of anal condyloma with high-grade dysplasia, AIN 2  He will require close follow-up assess for new lesions  Additionally, he will likely require repeat operative intervention in 6 to 12 months  He appears to be healing appropriately with residual right perianal wound  Will follow-up in the office in 3 months         Assessment & Plan        History of Present Illness   History of Present Illness    Cleveland Serrano is a 60 y.o. male who presents or a post operative evaluation.     The patient is status post excision of anal/rectal condyloma, anal biopsy on 4/10/2025.     Operative Findings:  Large, circumferential condylomatous lesions of perianal skin extending towards the anal canal    Surgical pathology:  A. 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  - Negative for carcinoma.  - Resection margin is negative for high grade dysplasia.     History obtained from: patient    Review of Systems   Constitutional:  Negative for chills and fever.   HENT:  Negative for ear pain and sore throat.    Eyes:  Negative for pain and visual disturbance.   Respiratory:  Negative for cough and shortness of breath.    Cardiovascular:  Negative for chest pain and palpitations.   Gastrointestinal:  Negative for abdominal pain and vomiting.   Genitourinary:  Negative for dysuria and hematuria.   Musculoskeletal:  Negative for arthralgias and back pain.   Skin:  Negative for color change and rash.   Neurological:  Negative for seizures and syncope.   All other systems reviewed and are negative.    Past Medical History   Past Medical History:    Diagnosis Date    Anxiety     CPAP (continuous positive airway pressure) dependence     not since inspire device    Depression     Diabetes mellitus (HCC)     Sleep apnea     hx-had Inspire implant     Past Surgical History:   Procedure Laterality Date    COLONOSCOPY      MULTIPLE TOOTH EXTRACTIONS      OH DSTRJ LESION ANUS EXTENSIVE N/A 4/10/2025    Procedure: EXCISION CONDYLOMA ANAL/RECTAL, ANAL BIOPSY;  Surgeon: Liliana Cruz MD;  Location: [facility];  Service: Colorectal    OH OPEN IMPLTJ HPGLSL NRV NSTIM RA PG&RESPIR SENSOR N/A 04/11/2024    Procedure: INSERTION UPPER AIRWAY STIMULATOR;  Surgeon: Jose Tyson MD;  Location: [facility];  Service: ENT    OH PALATOPHARYNGOPLASTY N/A 04/07/2022    Procedure: UVULOPALATOPHARYNGOPLASTY (UPPP);  Surgeon: Jose Tyson MD;  Location: [facility];  Service: ENT    TESTICLE SURGERY Left      Family History   Problem Relation Age of Onset    Anxiety disorder Father         Previously on Valium    Depression Father       reports that he has been smoking cigarettes. He started smoking about 45 years ago. He has a 11.3 pack-year smoking history. He has never used smokeless tobacco. He reports that he does not currently use alcohol. He reports that he does not use drugs.  Current Outpatient Medications   Medication Instructions    FLUoxetine (PROZAC) 20 mg, Oral, Daily    FLUoxetine (PROZAC) 40 mg, Oral, Daily    fluticasone (FLONASE) 50 mcg/act nasal spray 2 sprays, Nasal, Daily    imiquimod (ALDARA) 5 % cream 1 packet, Topical, 3 times weekly    lamoTRIgine (LAMICTAL) 100 mg, Oral, Daily    multivitamin (THERAGRAN) TABS 1 tablet, Daily    nicotine (NICODERM CQ) 14 mg/24hr TD 24 hr patch 1 patch, Transdermal, Every 24 hours    nicotine (NICODERM CQ) 7 mg/24hr TD 24 hr patch 1 patch, Transdermal, Every 24 hours    oxyCODONE (ROXICODONE) 5 mg, Oral, Every 4 hours PRN    rosuvastatin (CRESTOR) 20 mg, Oral, Daily    semaglutide (RYBELSUS) 14 mg, Oral, Daily  before breakfast    sildenafil (VIAGRA) 100 mg, Oral, Daily PRN   Allergies[1]      Objective   /64   Wt 82.7 kg (182 lb 6.4 oz)   BMI 27.73 kg/m²      Physical Exam  Vitals and nursing note reviewed.   Constitutional:       General: He is not in acute distress.     Appearance: He is well-developed.   HENT:      Head: Normocephalic and atraumatic.     Eyes:      Conjunctiva/sclera: Conjunctivae normal.       Cardiovascular:      Rate and Rhythm: Normal rate and regular rhythm.      Heart sounds: No murmur heard.  Pulmonary:      Effort: Pulmonary effort is normal. No respiratory distress.      Breath sounds: Normal breath sounds.   Abdominal:      Palpations: Abdomen is soft.      Tenderness: There is no abdominal tenderness.   Genitourinary:     Comments: Dime sized right perianal wound with granulation tissue present    Musculoskeletal:         General: No swelling.      Cervical back: Neck supple.     Skin:     General: Skin is warm and dry.      Capillary Refill: Capillary refill takes less than 2 seconds.     Neurological:      Mental Status: He is alert.     Psychiatric:         Mood and Affect: Mood normal.       Physical Exam      Results    Administrative Statements   I have spent a total time of 16 minutes in caring for this patient on the day of the visit/encounter including Diagnostic results, Prognosis, Risks and benefits of tx options, Instructions for management, Patient and family education, Importance of tx compliance, Risk factor reductions, Impressions, Counseling / Coordination of care, Documenting in the medical record, Reviewing/placing orders in the medical record (including tests, medications, and/or procedures), Obtaining or reviewing history  , and Communicating with other healthcare professionals .       [1] No Known Allergies

## 2025-05-16 NOTE — PATIENT INSTRUCTIONS
Avoid overcleaning; after a bowel movement, clean the area with only water.    Avoid soaps, detergents, and lotions around your bottom.    You may use Calmoseptine (after 1 week) or Aquaphor as a barrier ointment.    Avoid foods that are high in acidity: caffeine, chocolate, alcohol, citrus, spicy foods, and tomato.

## 2025-07-16 ENCOUNTER — OFFICE VISIT (OUTPATIENT)
Dept: FAMILY MEDICINE CLINIC | Facility: CLINIC | Age: 60
End: 2025-07-16
Payer: COMMERCIAL

## 2025-07-16 VITALS
HEART RATE: 85 BPM | RESPIRATION RATE: 16 BRPM | SYSTOLIC BLOOD PRESSURE: 128 MMHG | OXYGEN SATURATION: 95 % | HEIGHT: 68 IN | DIASTOLIC BLOOD PRESSURE: 64 MMHG | BODY MASS INDEX: 28.79 KG/M2 | WEIGHT: 190 LBS | TEMPERATURE: 98.6 F

## 2025-07-16 DIAGNOSIS — E11.69 TYPE 2 DIABETES MELLITUS WITH OTHER SPECIFIED COMPLICATION, WITHOUT LONG-TERM CURRENT USE OF INSULIN (HCC): Primary | ICD-10-CM

## 2025-07-16 DIAGNOSIS — E78.2 MIXED HYPERLIPIDEMIA: ICD-10-CM

## 2025-07-16 DIAGNOSIS — F33.0 MILD EPISODE OF RECURRENT MAJOR DEPRESSIVE DISORDER (HCC): ICD-10-CM

## 2025-07-16 DIAGNOSIS — G47.33 OSA (OBSTRUCTIVE SLEEP APNEA): ICD-10-CM

## 2025-07-16 LAB — SL AMB POCT HEMOGLOBIN AIC: 6.6 (ref ?–6.5)

## 2025-07-16 PROCEDURE — 83036 HEMOGLOBIN GLYCOSYLATED A1C: CPT | Performed by: FAMILY MEDICINE

## 2025-07-16 PROCEDURE — 99214 OFFICE O/P EST MOD 30 MIN: CPT | Performed by: FAMILY MEDICINE

## 2025-07-16 NOTE — PROGRESS NOTES
Name: Cleveland Serrano      : 1965      MRN: [id number]  Encounter Provider: Dg Blackman MD  Encounter Date: 2025   [facility]  :  Assessment & Plan  Type 2 diabetes mellitus with other specified complication, without long-term current use of insulin (HCC)  Tolerating medication adjustment well. Continue taking Rybelsus 14 mg. Will continue to follow. Encouraged low-carb diet and continued exercise.   Lab Results   Component Value Date    HGBA1C 6.6 (A) 2025       Orders:  •  Albumin / creatinine urine ratio; Future  •  Comprehensive metabolic panel; Future  •  Hemoglobin A1C; Future  •  TSH, 3rd generation with Free T4 reflex; Future  •  Lipid Panel with Direct LDL reflex; Future  •  CBC and differential; Future  •  POCT hemoglobin A1c    CECILIA (obstructive sleep apnea)  Recent Inspire placed. Following with pulmonology.        Mixed hyperlipidemia  No updated labs. Continue taking Crestor 20 mg. Encouraged dietary modification. Will monitor labs.        Mild episode of recurrent major depressive disorder (HCC)    Stable on Lamictal and fluoxetine.  Continue same.  Follow-up with psychiatrist.                  History of Present Illness   Cleveland is a 60 year old patient presenting for multiple chronic conditions management. He has no complaints for today's visit. Last visit increased Rybelsus 14 mg. Tolerating medication well with no side effects noted. Trying to improve diet. Exercising by walking and swimming. Denies any anxiety, SI/HI, polyuria, polydipsia, polyphagia, numbness/tingling, SOB, visual disturbances, or chest pain.         Review of Systems   Eyes:  Negative for visual disturbance.   Respiratory:  Negative for shortness of breath.    Cardiovascular:  Negative for chest pain.   Gastrointestinal:  Negative for abdominal pain, constipation, diarrhea, nausea and vomiting.   Endocrine: Negative for polyphagia and polyuria.  "  Neurological:  Negative for dizziness and headaches.   All other systems reviewed and are negative.      Objective   /64 (BP Location: Left arm, Patient Position: Sitting, Cuff Size: Adult)   Pulse 85   Temp 98.6 °F (37 °C) (Tympanic)   Resp 16   Ht 5' 8\" (1.727 m)   Wt 86.2 kg (190 lb)   SpO2 95%   BMI 28.89 kg/m²      Physical Exam  Vitals reviewed.   Constitutional:       Appearance: Normal appearance.   HENT:      Head: Normocephalic and atraumatic.      Right Ear: Tympanic membrane, ear canal and external ear normal.      Left Ear: Tympanic membrane, ear canal and external ear normal.      Nose: Nose normal.      Mouth/Throat:      Mouth: Mucous membranes are moist.      Pharynx: Oropharynx is clear.     Eyes:      Extraocular Movements: Extraocular movements intact.      Conjunctiva/sclera: Conjunctivae normal.       Cardiovascular:      Rate and Rhythm: Normal rate and regular rhythm.      Pulses: Normal pulses.      Heart sounds: Normal heart sounds.   Pulmonary:      Effort: Pulmonary effort is normal.      Breath sounds: Normal breath sounds.     Musculoskeletal:      Right lower leg: No edema.      Left lower leg: No edema.     Skin:     General: Skin is warm and dry.     Neurological:      Mental Status: He is alert and oriented to person, place, and time.     Psychiatric:         Mood and Affect: Mood normal.         Behavior: Behavior normal.         "

## 2025-07-16 NOTE — ASSESSMENT & PLAN NOTE
No updated labs. Continue taking Crestor 20 mg. Encouraged dietary modification. Will monitor labs.

## 2025-07-16 NOTE — ASSESSMENT & PLAN NOTE
Tolerating medication adjustment well. Continue taking Rybelsus 14 mg. Will continue to follow. Encouraged low-carb diet and continued exercise.   Lab Results   Component Value Date    HGBA1C 6.6 (A) 07/16/2025       Orders:  •  Albumin / creatinine urine ratio; Future  •  Comprehensive metabolic panel; Future  •  Hemoglobin A1C; Future  •  TSH, 3rd generation with Free T4 reflex; Future  •  Lipid Panel with Direct LDL reflex; Future  •  CBC and differential; Future  •  POCT hemoglobin A1c

## 2025-07-18 ENCOUNTER — APPOINTMENT (OUTPATIENT)
Dept: URGENT CARE | Age: 60
End: 2025-07-18

## 2025-08-04 ENCOUNTER — TELEPHONE (OUTPATIENT)
Age: 60
End: 2025-08-04

## 2025-08-20 ENCOUNTER — OFFICE VISIT (OUTPATIENT)
Age: 60
End: 2025-08-20
Payer: COMMERCIAL

## 2025-08-20 VITALS — BODY MASS INDEX: 29.1 KG/M2 | WEIGHT: 192 LBS | HEIGHT: 68 IN

## 2025-08-20 DIAGNOSIS — A63.0 CONDYLOMA: ICD-10-CM

## 2025-08-20 DIAGNOSIS — K62.9 ANAL LESION: Primary | ICD-10-CM

## 2025-08-20 PROCEDURE — 99212 OFFICE O/P EST SF 10 MIN: CPT | Performed by: SURGERY

## 2025-08-20 PROCEDURE — 46600 DIAGNOSTIC ANOSCOPY SPX: CPT | Performed by: SURGERY

## (undated) DEVICE — TUBING SUCTION 5MM X 12 FT

## (undated) DEVICE — GLOVE INDICATOR UNDERGLOVE SZ 6 BLUE

## (undated) DEVICE — SUT SILK PERMA-HAND 3-0 18IN A182H

## (undated) DEVICE — WET SKIN PREP TRAY: Brand: MEDLINE INDUSTRIES, INC.

## (undated) DEVICE — SUT SILK 3-0 RB-1 CV-23 18IN C053D

## (undated) DEVICE — STERILE BETHLEHEM T AND A PACK: Brand: CARDINAL HEALTH

## (undated) DEVICE — GLOVE SRG BIOGEL ECLIPSE 5.5

## (undated) DEVICE — SUT VICRYL 3-0 SH 27 IN J416H

## (undated) DEVICE — PREMIUM DRY TRAY LF: Brand: MEDLINE INDUSTRIES, INC.

## (undated) DEVICE — 3M™ TEGADERM™ TRANSPARENT FILM DRESSING FRAME STYLE, 1624W, 2-3/8 IN X 2-3/4 IN (6 CM X 7 CM), 100/CT 4CT/CASE: Brand: 3M™ TEGADERM™

## (undated) DEVICE — REMOTE SLEEP INSPIRE

## (undated) DEVICE — GLOVE INDICATOR PI UNDERGLOVE SZ 7.5 BLUE

## (undated) DEVICE — ELECTRODE 8227304 5PK PRASS PR 18MM ROHS

## (undated) DEVICE — SYRINGE 10ML LL

## (undated) DEVICE — MEDI-VAC YANK SUCT HNDL W/TPRD BULBOUS TIP: Brand: CARDINAL HEALTH

## (undated) DEVICE — SUT MONOCRYL 4-0 PS-2 27 IN Y426H

## (undated) DEVICE — NEEDLE 25GA X 1 IN SAFETY GLIDE

## (undated) DEVICE — 10FR FRAZIER SUCTION HANDLE: Brand: CARDINAL HEALTH

## (undated) DEVICE — PACK UNIVERSAL NECK

## (undated) DEVICE — SUT SILK 2-0 SH 30 IN K833H

## (undated) DEVICE — PROVE COVER: Brand: UNBRANDED

## (undated) DEVICE — REM POLYHESIVE ADULT PATIENT RETURN ELECTRODE: Brand: VALLEYLAB

## (undated) DEVICE — INSULATED NEEDLE ELECTRODE: Brand: EDGE

## (undated) DEVICE — DRAPE SHEET THREE QUARTER

## (undated) DEVICE — GLOVE SRG BIOGEL 6.5

## (undated) DEVICE — NEEDLE 18 G X 1 1/2

## (undated) DEVICE — GAUZE SPONGES,16 PLY: Brand: CURITY

## (undated) DEVICE — GLOVE SRG BIOGEL 7.5

## (undated) DEVICE — CATHETER 8591-38 PASSER,38CM

## (undated) DEVICE — DRAPE STERI 1010 18IN X 12IN

## (undated) DEVICE — PROBE 8225401 5PK SD-SD BIPOL STIM ROHS

## (undated) DEVICE — VESSEL LOOP MAXI - RED

## (undated) DEVICE — STRAIGHT CATH RED RUBBER 12FR

## (undated) DEVICE — 3M™ STERI-STRIP™ REINFORCED ADHESIVE SKIN CLOSURES, R1547, 1/2 IN X 4 IN (12 MM X 100 MM), 6 STRIPS/ENVELOPE: Brand: 3M™ STERI-STRIP™

## (undated) DEVICE — GAUZE SPONGES,USP TYPE VII GAUZE, 12 PLY: Brand: CURITY

## (undated) DEVICE — POV-IOD SOLUTION 4OZ BT

## (undated) DEVICE — 3M™ TEGADERM™ TRANSPARENT FILM DRESSING FRAME STYLE, 1626W, 4 IN X 4-3/4 IN (10 CM X 12 CM), 50/CT 4CT/CASE: Brand: 3M™ TEGADERM™

## (undated) DEVICE — ALL PURPOSE SPONGES,NONWOVEN, 4 PLY: Brand: CURITY

## (undated) DEVICE — DISPOSABLE BRIEF/UNDERWEAR

## (undated) DEVICE — GLOVE INDICATOR PI UNDERGLOVE SZ 6.5 BLUE

## (undated) DEVICE — 3M™ IOBAN™ 2 ANTIMICROBIAL INCISE DRAPE 6650EZ: Brand: IOBAN™ 2

## (undated) DEVICE — ANTI-FOG SOLUTION WITH FOAM PAD: Brand: DEVON

## (undated) DEVICE — VESSEL LOOPS X-RAY DETECTABLE: Brand: DEROYAL

## (undated) DEVICE — PENCILETTE PUSH BUTTON COATED

## (undated) DEVICE — BIPOLAR CORD DISP

## (undated) DEVICE — SPECIMEN CONTAINER STERILE PEEL PACK

## (undated) DEVICE — SPONGE CHERRY 1/2IN

## (undated) DEVICE — BETHLEHEM UNIVERSAL MINOR GEN: Brand: CARDINAL HEALTH

## (undated) DEVICE — NEEDLE 25G X 1 1/2

## (undated) DEVICE — IV CATH INTROCAN 18G X 1 1/4 SAFETY

## (undated) DEVICE — LUBRICANT JELLY SURGILUBE TUBE 4OZ FLIP TOP

## (undated) DEVICE — INTENDED FOR TISSUE SEPARATION, AND OTHER PROCEDURES THAT REQUIRE A SHARP SURGICAL BLADE TO PUNCTURE OR CUT.: Brand: BARD-PARKER SAFETY BLADES SIZE 15, STERILE

## (undated) DEVICE — WAND COBLATION  EVAC 70 XTRA TONSIL

## (undated) DEVICE — ALCOHOL PREP, 2 PLY, LARGE, MADE IN USA, SATURATED WITH 70% ISOPROPYL ALCOHOL, FOR EXTERNAL USE ONLY: Brand: WEBCOL

## (undated) DEVICE — MASTISOL LIQ ADHESIVE 2/3ML

## (undated) DEVICE — TIBURON SPLIT SHEET: Brand: CONVERTORS

## (undated) DEVICE — SKIN MARKER DUAL TIP WITH RULER CAP, FLEXIBLE RULER AND LABELS: Brand: DEVON